# Patient Record
Sex: FEMALE | Race: WHITE | NOT HISPANIC OR LATINO | Employment: OTHER | URBAN - METROPOLITAN AREA
[De-identification: names, ages, dates, MRNs, and addresses within clinical notes are randomized per-mention and may not be internally consistent; named-entity substitution may affect disease eponyms.]

---

## 2017-03-15 PROBLEM — R73.02 GLUCOSE INTOLERANCE (IMPAIRED GLUCOSE TOLERANCE): Status: ACTIVE | Noted: 2017-03-15

## 2017-06-06 ENCOUNTER — PATIENT OUTREACH (OUTPATIENT)
Dept: HEALTH INFORMATION MANAGEMENT | Facility: OTHER | Age: 67
End: 2017-06-06

## 2017-06-06 NOTE — PROGRESS NOTES
Attempt #:1    WebIZ Checked & Epic Updated: no  HealthConnect Verified: no  Verify PCP: yes    Communication Preference Obtained: yes     Review Care Team: yes    Annual Wellness Visit Scheduling  1. Scheduling Status:Scheduled     Care Coordination Enrollment (Attempt to Enroll in Care Coordination)  2. Is patient appropriate: YES  3. Is patient interested: NO - not interested     Care Gap Scheduling (Attempt to Schedule EACH Overdue Care Gap!)     Health Maintenance Due   Topic Date Due   • IMM ZOSTER VACCINE  01/27/2010   • BONE DENSITY  05/14/2010   • Annual Wellness Visit  SCHEDULED    • IMM PNEUMOCOCCAL 65+ (ADULT) LOW/MEDIUM RISK SERIES (2 of 2 - PPSV23) 05/03/2017   • MAMMOGRAM  05/09/2017         Fight My Monster Activation: already active  Fight My Monster Mio: no  Virtual Visits: no  Opt In to Text Messages: no

## 2017-12-11 ENCOUNTER — PATIENT OUTREACH (OUTPATIENT)
Dept: HEALTH INFORMATION MANAGEMENT | Facility: OTHER | Age: 67
End: 2017-12-11

## 2017-12-12 NOTE — PROGRESS NOTES
Attempt #:1  Verify PCP: yes    Communication Preference Obtained: yes     Review Care Team: no    Annual Wellness Visit Scheduling  1. Scheduling Status:Scheduled        Care Gap Scheduling (Attempt to Schedule EACH Overdue Care Gap!)     Health Maintenance Due   Topic Date Due   • IMM ZOSTER VACCINE  01/27/2010   • BONE DENSITY  05/14/2010   • Annual Wellness Visit  01/03/2016   • IMM PNEUMOCOCCAL 65+ (ADULT) LOW/MEDIUM RISK SERIES (2 of 2 - PPSV23) 05/03/2017   • MAMMOGRAM  05/09/2017   • IMM INFLUENZA (1) 09/01/2017        Scheduled patient for Annual Wellness Visit       Conelum Activation: ACTIVE  Conelum Mio: no  Virtual Visits: no  Opt In to Text Messages: no

## 2019-02-15 PROBLEM — K21.9 GASTROESOPHAGEAL REFLUX DISEASE WITHOUT ESOPHAGITIS: Status: ACTIVE | Noted: 2019-02-15

## 2019-02-19 PROBLEM — K22.2 ESOPHAGEAL STRICTURE: Status: ACTIVE | Noted: 2019-02-19

## 2019-02-19 PROBLEM — K22.70 BARRETT'S ESOPHAGUS WITHOUT DYSPLASIA: Status: ACTIVE | Noted: 2019-02-19

## 2019-06-10 PROBLEM — J30.1 SEASONAL ALLERGIC RHINITIS DUE TO POLLEN: Status: ACTIVE | Noted: 2019-06-10

## 2020-09-24 ENCOUNTER — PRE-ADMISSION TESTING (OUTPATIENT)
Dept: ADMISSIONS | Facility: MEDICAL CENTER | Age: 70
DRG: 329 | End: 2020-09-24
Attending: SURGERY
Payer: MEDICARE

## 2020-09-24 DIAGNOSIS — Z01.812 PRE-OPERATIVE LABORATORY EXAMINATION: Primary | ICD-10-CM

## 2020-09-24 RX ORDER — IPRATROPIUM BROMIDE 21 UG/1
2 SPRAY, METERED NASAL EVERY 12 HOURS
COMMUNITY

## 2020-09-24 RX ORDER — DULOXETIN HYDROCHLORIDE 60 MG/1
120 CAPSULE, DELAYED RELEASE ORAL DAILY
COMMUNITY
End: 2021-06-03

## 2020-09-24 NOTE — OR NURSING
Left message for [facility] surgery scheduler at Dr Boyce's office regarding surgery 9/29/2020.  Tamiko states she does not know about surgery scheduled with Dr Carbajal (placing bilateral ureteral stents) at the same time as Dr Boyce.  Patient will call Dr Boyce this afternoon to discuss with them.

## 2020-09-25 ENCOUNTER — PRE-ADMISSION TESTING (OUTPATIENT)
Dept: ADMISSIONS | Facility: MEDICAL CENTER | Age: 70
DRG: 329 | End: 2020-09-25
Attending: SURGERY
Payer: MEDICARE

## 2020-09-25 LAB
COVID ORDER STATUS COVID19: NORMAL
SARS-COV-2 RNA RESP QL NAA+PROBE: NOTDETECTED
SPECIMEN SOURCE: NORMAL

## 2020-09-25 PROCEDURE — U0003 INFECTIOUS AGENT DETECTION BY NUCLEIC ACID (DNA OR RNA); SEVERE ACUTE RESPIRATORY SYNDROME CORONAVIRUS 2 (SARS-COV-2) (CORONAVIRUS DISEASE [COVID-19]), AMPLIFIED PROBE TECHNIQUE, MAKING USE OF HIGH THROUGHPUT TECHNOLOGIES AS DESCRIBED BY CMS-2020-01-R: HCPCS

## 2020-09-25 PROCEDURE — C9803 HOPD COVID-19 SPEC COLLECT: HCPCS

## 2020-09-28 ENCOUNTER — ANESTHESIA EVENT (OUTPATIENT)
Dept: SURGERY | Facility: MEDICAL CENTER | Age: 70
DRG: 329 | End: 2020-09-28
Payer: MEDICARE

## 2020-09-29 ENCOUNTER — APPOINTMENT (OUTPATIENT)
Dept: RADIOLOGY | Facility: MEDICAL CENTER | Age: 70
DRG: 329 | End: 2020-09-29
Attending: UROLOGY
Payer: MEDICARE

## 2020-09-29 ENCOUNTER — HOSPITAL ENCOUNTER (INPATIENT)
Facility: MEDICAL CENTER | Age: 70
LOS: 17 days | DRG: 329 | End: 2020-10-16
Attending: SURGERY | Admitting: SURGERY
Payer: MEDICARE

## 2020-09-29 ENCOUNTER — ANESTHESIA (OUTPATIENT)
Dept: SURGERY | Facility: MEDICAL CENTER | Age: 70
DRG: 329 | End: 2020-09-29
Payer: MEDICARE

## 2020-09-29 DIAGNOSIS — Z93.2 ILEOSTOMY IN PLACE (HCC): ICD-10-CM

## 2020-09-29 DIAGNOSIS — G89.18 POST-OP PAIN: ICD-10-CM

## 2020-09-29 LAB
EKG IMPRESSION: NORMAL
GLUCOSE BLD-MCNC: 98 MG/DL (ref 65–99)
PATHOLOGY CONSULT NOTE: NORMAL

## 2020-09-29 PROCEDURE — 160048 HCHG OR STATISTICAL LEVEL 1-5: Performed by: SURGERY

## 2020-09-29 PROCEDURE — 770001 HCHG ROOM/CARE - MED/SURG/GYN PRIV*

## 2020-09-29 PROCEDURE — BT141ZZ FLUOROSCOPY OF KIDNEYS, URETERS AND BLADDER USING LOW OSMOLAR CONTRAST: ICD-10-PCS | Performed by: UROLOGY

## 2020-09-29 PROCEDURE — 700105 HCHG RX REV CODE 258: Performed by: SURGERY

## 2020-09-29 PROCEDURE — 501460 HCHG STAPLER, TA55 35LD: Performed by: SURGERY

## 2020-09-29 PROCEDURE — 88307 TISSUE EXAM BY PATHOLOGIST: CPT

## 2020-09-29 PROCEDURE — 700102 HCHG RX REV CODE 250 W/ 637 OVERRIDE(OP): Performed by: STUDENT IN AN ORGANIZED HEALTH CARE EDUCATION/TRAINING PROGRAM

## 2020-09-29 PROCEDURE — 501452 HCHG STAPLES, GIA MULTIFIRE 60/80: Performed by: SURGERY

## 2020-09-29 PROCEDURE — C1769 GUIDE WIRE: HCPCS | Performed by: SURGERY

## 2020-09-29 PROCEDURE — 160035 HCHG PACU - 1ST 60 MINS PHASE I: Performed by: SURGERY

## 2020-09-29 PROCEDURE — 700101 HCHG RX REV CODE 250: Performed by: STUDENT IN AN ORGANIZED HEALTH CARE EDUCATION/TRAINING PROGRAM

## 2020-09-29 PROCEDURE — A9270 NON-COVERED ITEM OR SERVICE: HCPCS | Performed by: STUDENT IN AN ORGANIZED HEALTH CARE EDUCATION/TRAINING PROGRAM

## 2020-09-29 PROCEDURE — 93010 ELECTROCARDIOGRAM REPORT: CPT | Performed by: INTERNAL MEDICINE

## 2020-09-29 PROCEDURE — 88304 TISSUE EXAM BY PATHOLOGIST: CPT | Mod: 59

## 2020-09-29 PROCEDURE — 700111 HCHG RX REV CODE 636 W/ 250 OVERRIDE (IP): Performed by: SURGERY

## 2020-09-29 PROCEDURE — 501838 HCHG SUTURE GENERAL: Performed by: SURGERY

## 2020-09-29 PROCEDURE — 160009 HCHG ANES TIME/MIN: Performed by: SURGERY

## 2020-09-29 PROCEDURE — 501435 HCHG STAPLER, LINEAR 60: Performed by: SURGERY

## 2020-09-29 PROCEDURE — 160028 HCHG SURGERY MINUTES - 1ST 30 MINS LEVEL 3: Performed by: SURGERY

## 2020-09-29 PROCEDURE — 700111 HCHG RX REV CODE 636 W/ 250 OVERRIDE (IP): Performed by: STUDENT IN AN ORGANIZED HEALTH CARE EDUCATION/TRAINING PROGRAM

## 2020-09-29 PROCEDURE — 502704 HCHG DEVICE, LIGASURE IMPACT: Performed by: SURGERY

## 2020-09-29 PROCEDURE — 0DN80ZZ RELEASE SMALL INTESTINE, OPEN APPROACH: ICD-10-PCS | Performed by: SURGERY

## 2020-09-29 PROCEDURE — A9270 NON-COVERED ITEM OR SERVICE: HCPCS | Performed by: SURGERY

## 2020-09-29 PROCEDURE — C1758 CATHETER, URETERAL: HCPCS | Performed by: SURGERY

## 2020-09-29 PROCEDURE — 500380 HCHG DRAIN, PENROSE 1/4X12: Performed by: SURGERY

## 2020-09-29 PROCEDURE — 82962 GLUCOSE BLOOD TEST: CPT

## 2020-09-29 PROCEDURE — 93005 ELECTROCARDIOGRAM TRACING: CPT | Performed by: SURGERY

## 2020-09-29 PROCEDURE — 0DSN0ZZ REPOSITION SIGMOID COLON, OPEN APPROACH: ICD-10-PCS | Performed by: SURGERY

## 2020-09-29 PROCEDURE — C1765 ADHESION BARRIER: HCPCS | Performed by: SURGERY

## 2020-09-29 PROCEDURE — 501433 HCHG STAPLER, GIA MULTIFIRE 60/80: Performed by: SURGERY

## 2020-09-29 PROCEDURE — 160039 HCHG SURGERY MINUTES - EA ADDL 1 MIN LEVEL 3: Performed by: SURGERY

## 2020-09-29 PROCEDURE — A6402 STERILE GAUZE <= 16 SQ IN: HCPCS | Performed by: SURGERY

## 2020-09-29 PROCEDURE — 500042 HCHG BAG, URINARY DRAINAGE (CLOSED): Performed by: SURGERY

## 2020-09-29 PROCEDURE — 160036 HCHG PACU - EA ADDL 30 MINS PHASE I: Performed by: SURGERY

## 2020-09-29 PROCEDURE — 700102 HCHG RX REV CODE 250 W/ 637 OVERRIDE(OP): Performed by: SURGERY

## 2020-09-29 PROCEDURE — 500378 HCHG DRAIN, J-VAC ROUND 19FR: Performed by: SURGERY

## 2020-09-29 PROCEDURE — 0T788DZ DILATION OF BILATERAL URETERS WITH INTRALUMINAL DEVICE, VIA NATURAL OR ARTIFICIAL OPENING ENDOSCOPIC: ICD-10-PCS | Performed by: UROLOGY

## 2020-09-29 PROCEDURE — 700101 HCHG RX REV CODE 250: Performed by: SURGERY

## 2020-09-29 PROCEDURE — 501428 HCHG STAPLER, CURVED: Performed by: SURGERY

## 2020-09-29 PROCEDURE — 0T9B80Z DRAINAGE OF BLADDER WITH DRAINAGE DEVICE, VIA NATURAL OR ARTIFICIAL OPENING ENDOSCOPIC: ICD-10-PCS | Performed by: UROLOGY

## 2020-09-29 PROCEDURE — 700117 HCHG RX CONTRAST REV CODE 255: Performed by: UROLOGY

## 2020-09-29 PROCEDURE — 160002 HCHG RECOVERY MINUTES (STAT): Performed by: SURGERY

## 2020-09-29 PROCEDURE — 770006 HCHG ROOM/CARE - MED/SURG/GYN SEMI*

## 2020-09-29 RX ORDER — TRAZODONE HYDROCHLORIDE 50 MG/1
50 TABLET ORAL NIGHTLY PRN
Status: DISCONTINUED | OUTPATIENT
Start: 2020-09-29 | End: 2020-10-06

## 2020-09-29 RX ORDER — ONDANSETRON 2 MG/ML
4 INJECTION INTRAMUSCULAR; INTRAVENOUS EVERY 4 HOURS PRN
Status: DISCONTINUED | OUTPATIENT
Start: 2020-09-29 | End: 2020-10-16 | Stop reason: HOSPADM

## 2020-09-29 RX ORDER — HALOPERIDOL 5 MG/ML
1 INJECTION INTRAMUSCULAR EVERY 6 HOURS PRN
Status: DISCONTINUED | OUTPATIENT
Start: 2020-09-29 | End: 2020-10-11

## 2020-09-29 RX ORDER — LABETALOL HYDROCHLORIDE 5 MG/ML
INJECTION, SOLUTION INTRAVENOUS PRN
Status: DISCONTINUED | OUTPATIENT
Start: 2020-09-29 | End: 2020-09-29 | Stop reason: SURG

## 2020-09-29 RX ORDER — SODIUM CHLORIDE, SODIUM LACTATE, POTASSIUM CHLORIDE, CALCIUM CHLORIDE 600; 310; 30; 20 MG/100ML; MG/100ML; MG/100ML; MG/100ML
INJECTION, SOLUTION INTRAVENOUS CONTINUOUS
Status: DISCONTINUED | OUTPATIENT
Start: 2020-09-29 | End: 2020-09-30

## 2020-09-29 RX ORDER — HYDROMORPHONE HYDROCHLORIDE 1 MG/ML
0.1 INJECTION, SOLUTION INTRAMUSCULAR; INTRAVENOUS; SUBCUTANEOUS
Status: DISCONTINUED | OUTPATIENT
Start: 2020-09-29 | End: 2020-09-29 | Stop reason: HOSPADM

## 2020-09-29 RX ORDER — TRAZODONE HYDROCHLORIDE 50 MG/1
50-100 TABLET ORAL
Status: DISCONTINUED | OUTPATIENT
Start: 2020-09-29 | End: 2020-10-06

## 2020-09-29 RX ORDER — TIMOLOL MALEATE 5 MG/ML
1 SOLUTION/ DROPS OPHTHALMIC 2 TIMES DAILY
Status: DISCONTINUED | OUTPATIENT
Start: 2020-09-29 | End: 2020-10-16 | Stop reason: HOSPADM

## 2020-09-29 RX ORDER — IPRATROPIUM BROMIDE 21 UG/1
2 SPRAY, METERED NASAL EVERY 12 HOURS
Status: DISCONTINUED | OUTPATIENT
Start: 2020-09-29 | End: 2020-10-16 | Stop reason: HOSPADM

## 2020-09-29 RX ORDER — ACETAMINOPHEN 500 MG
TABLET ORAL
Status: COMPLETED
Start: 2020-09-29 | End: 2020-09-29

## 2020-09-29 RX ORDER — SCOLOPAMINE TRANSDERMAL SYSTEM 1 MG/1
1 PATCH, EXTENDED RELEASE TRANSDERMAL
Status: DISCONTINUED | OUTPATIENT
Start: 2020-09-29 | End: 2020-10-16 | Stop reason: HOSPADM

## 2020-09-29 RX ORDER — GLYCOPYRROLATE 0.2 MG/ML
INJECTION INTRAMUSCULAR; INTRAVENOUS PRN
Status: DISCONTINUED | OUTPATIENT
Start: 2020-09-29 | End: 2020-09-29 | Stop reason: SURG

## 2020-09-29 RX ORDER — LIDOCAINE HYDROCHLORIDE 20 MG/ML
INJECTION, SOLUTION EPIDURAL; INFILTRATION; INTRACAUDAL; PERINEURAL PRN
Status: DISCONTINUED | OUTPATIENT
Start: 2020-09-29 | End: 2020-09-29 | Stop reason: SURG

## 2020-09-29 RX ORDER — SODIUM CHLORIDE, SODIUM LACTATE, POTASSIUM CHLORIDE, CALCIUM CHLORIDE 600; 310; 30; 20 MG/100ML; MG/100ML; MG/100ML; MG/100ML
INJECTION, SOLUTION INTRAVENOUS CONTINUOUS
Status: ACTIVE | OUTPATIENT
Start: 2020-09-29 | End: 2020-09-29

## 2020-09-29 RX ORDER — HYDROMORPHONE HYDROCHLORIDE 1 MG/ML
0.4 INJECTION, SOLUTION INTRAMUSCULAR; INTRAVENOUS; SUBCUTANEOUS
Status: DISCONTINUED | OUTPATIENT
Start: 2020-09-29 | End: 2020-09-29 | Stop reason: HOSPADM

## 2020-09-29 RX ORDER — PILOCARPINE HYDROCHLORIDE 5 MG/1
5 TABLET, FILM COATED ORAL 3 TIMES DAILY
Status: DISCONTINUED | OUTPATIENT
Start: 2020-09-29 | End: 2020-10-16 | Stop reason: HOSPADM

## 2020-09-29 RX ORDER — KETOROLAC TROMETHAMINE 30 MG/ML
15 INJECTION, SOLUTION INTRAMUSCULAR; INTRAVENOUS EVERY 6 HOURS
Status: DISPENSED | OUTPATIENT
Start: 2020-09-29 | End: 2020-10-02

## 2020-09-29 RX ORDER — HYDROMORPHONE HYDROCHLORIDE 1 MG/ML
0.2 INJECTION, SOLUTION INTRAMUSCULAR; INTRAVENOUS; SUBCUTANEOUS
Status: DISCONTINUED | OUTPATIENT
Start: 2020-09-29 | End: 2020-09-29 | Stop reason: HOSPADM

## 2020-09-29 RX ORDER — OXYCODONE HCL 5 MG/5 ML
10 SOLUTION, ORAL ORAL
Status: COMPLETED | OUTPATIENT
Start: 2020-09-29 | End: 2020-09-29

## 2020-09-29 RX ORDER — ONDANSETRON 2 MG/ML
4 INJECTION INTRAMUSCULAR; INTRAVENOUS
Status: DISCONTINUED | OUTPATIENT
Start: 2020-09-29 | End: 2020-09-29 | Stop reason: HOSPADM

## 2020-09-29 RX ORDER — DIPHENHYDRAMINE HYDROCHLORIDE 50 MG/ML
25 INJECTION INTRAMUSCULAR; INTRAVENOUS EVERY 6 HOURS PRN
Status: DISCONTINUED | OUTPATIENT
Start: 2020-09-29 | End: 2020-10-11

## 2020-09-29 RX ORDER — ONDANSETRON 2 MG/ML
INJECTION INTRAMUSCULAR; INTRAVENOUS PRN
Status: DISCONTINUED | OUTPATIENT
Start: 2020-09-29 | End: 2020-09-29 | Stop reason: SURG

## 2020-09-29 RX ORDER — KETAMINE HYDROCHLORIDE 50 MG/ML
INJECTION, SOLUTION INTRAMUSCULAR; INTRAVENOUS PRN
Status: DISCONTINUED | OUTPATIENT
Start: 2020-09-29 | End: 2020-09-29 | Stop reason: SURG

## 2020-09-29 RX ORDER — SODIUM CHLORIDE, SODIUM LACTATE, POTASSIUM CHLORIDE, CALCIUM CHLORIDE 600; 310; 30; 20 MG/100ML; MG/100ML; MG/100ML; MG/100ML
INJECTION, SOLUTION INTRAVENOUS CONTINUOUS
Status: CANCELLED | OUTPATIENT
Start: 2020-09-29 | End: 2020-09-29

## 2020-09-29 RX ORDER — HYDROMORPHONE HYDROCHLORIDE 2 MG/ML
INJECTION, SOLUTION INTRAMUSCULAR; INTRAVENOUS; SUBCUTANEOUS PRN
Status: DISCONTINUED | OUTPATIENT
Start: 2020-09-29 | End: 2020-09-29 | Stop reason: SURG

## 2020-09-29 RX ORDER — MIDAZOLAM HYDROCHLORIDE 1 MG/ML
INJECTION INTRAMUSCULAR; INTRAVENOUS PRN
Status: DISCONTINUED | OUTPATIENT
Start: 2020-09-29 | End: 2020-09-29 | Stop reason: SURG

## 2020-09-29 RX ORDER — LATANOPROST 50 UG/ML
1 SOLUTION/ DROPS OPHTHALMIC NIGHTLY
Status: DISCONTINUED | OUTPATIENT
Start: 2020-09-29 | End: 2020-10-16 | Stop reason: HOSPADM

## 2020-09-29 RX ORDER — DEXAMETHASONE SODIUM PHOSPHATE 4 MG/ML
4 INJECTION, SOLUTION INTRA-ARTICULAR; INTRALESIONAL; INTRAMUSCULAR; INTRAVENOUS; SOFT TISSUE
Status: DISCONTINUED | OUTPATIENT
Start: 2020-09-29 | End: 2020-10-08

## 2020-09-29 RX ORDER — DULOXETIN HYDROCHLORIDE 60 MG/1
120 CAPSULE, DELAYED RELEASE ORAL DAILY
Status: DISCONTINUED | OUTPATIENT
Start: 2020-09-30 | End: 2020-10-16 | Stop reason: HOSPADM

## 2020-09-29 RX ORDER — ACETAMINOPHEN 325 MG/1
TABLET ORAL PRN
Status: DISCONTINUED | OUTPATIENT
Start: 2020-09-29 | End: 2020-09-29 | Stop reason: SURG

## 2020-09-29 RX ORDER — DORZOLAMIDE HCL 20 MG/ML
1 SOLUTION/ DROPS OPHTHALMIC 3 TIMES DAILY
Status: DISCONTINUED | OUTPATIENT
Start: 2020-09-29 | End: 2020-10-16 | Stop reason: HOSPADM

## 2020-09-29 RX ORDER — FAMOTIDINE 20 MG/1
20 TABLET, FILM COATED ORAL 2 TIMES DAILY
Status: DISCONTINUED | OUTPATIENT
Start: 2020-09-29 | End: 2020-10-09

## 2020-09-29 RX ORDER — CALCIUM CARBONATE 500 MG/1
500 TABLET, CHEWABLE ORAL
Status: DISCONTINUED | OUTPATIENT
Start: 2020-09-29 | End: 2020-10-16 | Stop reason: HOSPADM

## 2020-09-29 RX ORDER — OXYCODONE HCL 5 MG/5 ML
5 SOLUTION, ORAL ORAL
Status: COMPLETED | OUTPATIENT
Start: 2020-09-29 | End: 2020-09-29

## 2020-09-29 RX ORDER — DEXMEDETOMIDINE HYDROCHLORIDE 100 UG/ML
INJECTION, SOLUTION INTRAVENOUS PRN
Status: DISCONTINUED | OUTPATIENT
Start: 2020-09-29 | End: 2020-09-29 | Stop reason: SURG

## 2020-09-29 RX ORDER — BUPIVACAINE HYDROCHLORIDE AND EPINEPHRINE 2.5; 5 MG/ML; UG/ML
INJECTION, SOLUTION EPIDURAL; INFILTRATION; INTRACAUDAL; PERINEURAL
Status: DISCONTINUED | OUTPATIENT
Start: 2020-09-29 | End: 2020-09-29 | Stop reason: HOSPADM

## 2020-09-29 RX ORDER — DEXAMETHASONE SODIUM PHOSPHATE 4 MG/ML
INJECTION, SOLUTION INTRA-ARTICULAR; INTRALESIONAL; INTRAMUSCULAR; INTRAVENOUS; SOFT TISSUE PRN
Status: DISCONTINUED | OUTPATIENT
Start: 2020-09-29 | End: 2020-09-29 | Stop reason: SURG

## 2020-09-29 RX ORDER — ACETAMINOPHEN 500 MG
1000 TABLET ORAL EVERY 6 HOURS
Status: DISPENSED | OUTPATIENT
Start: 2020-09-29 | End: 2020-10-04

## 2020-09-29 RX ORDER — CEFOTETAN DISODIUM 2 G/20ML
INJECTION, POWDER, FOR SOLUTION INTRAMUSCULAR; INTRAVENOUS PRN
Status: DISCONTINUED | OUTPATIENT
Start: 2020-09-29 | End: 2020-09-29 | Stop reason: SURG

## 2020-09-29 RX ORDER — HALOPERIDOL 5 MG/ML
1 INJECTION INTRAMUSCULAR
Status: DISCONTINUED | OUTPATIENT
Start: 2020-09-29 | End: 2020-09-29 | Stop reason: HOSPADM

## 2020-09-29 RX ORDER — ROCURONIUM BROMIDE 10 MG/ML
INJECTION, SOLUTION INTRAVENOUS PRN
Status: DISCONTINUED | OUTPATIENT
Start: 2020-09-29 | End: 2020-09-29 | Stop reason: SURG

## 2020-09-29 RX ORDER — MORPHINE SULFATE 4 MG/ML
4 INJECTION, SOLUTION INTRAMUSCULAR; INTRAVENOUS
Status: DISCONTINUED | OUTPATIENT
Start: 2020-09-29 | End: 2020-10-02

## 2020-09-29 RX ADMIN — KETAMINE HYDROCHLORIDE 25 MG: 50 INJECTION INTRAMUSCULAR; INTRAVENOUS at 10:16

## 2020-09-29 RX ADMIN — DEXMEDETOMIDINE HYDROCHLORIDE 10 MCG: 100 INJECTION, SOLUTION INTRAVENOUS at 10:26

## 2020-09-29 RX ADMIN — FENTANYL CITRATE 50 MCG: 50 INJECTION, SOLUTION INTRAMUSCULAR; INTRAVENOUS at 10:35

## 2020-09-29 RX ADMIN — MORPHINE SULFATE: 50 INJECTION, SOLUTION, CONCENTRATE INTRAVENOUS at 14:36

## 2020-09-29 RX ADMIN — TRAZODONE HYDROCHLORIDE 100 MG: 50 TABLET ORAL at 20:47

## 2020-09-29 RX ADMIN — ACETAMINOPHEN 1000 MG: 325 TABLET, FILM COATED ORAL at 07:53

## 2020-09-29 RX ADMIN — HYDROMORPHONE HYDROCHLORIDE 0.4 MG: 2 INJECTION, SOLUTION INTRAMUSCULAR; INTRAVENOUS; SUBCUTANEOUS at 09:21

## 2020-09-29 RX ADMIN — ROCURONIUM BROMIDE 10 MG: 10 INJECTION, SOLUTION INTRAVENOUS at 09:55

## 2020-09-29 RX ADMIN — SODIUM CHLORIDE, POTASSIUM CHLORIDE, SODIUM LACTATE AND CALCIUM CHLORIDE: 600; 310; 30; 20 INJECTION, SOLUTION INTRAVENOUS at 08:28

## 2020-09-29 RX ADMIN — DEXMEDETOMIDINE HYDROCHLORIDE 10 MCG: 100 INJECTION, SOLUTION INTRAVENOUS at 10:21

## 2020-09-29 RX ADMIN — HYDROMORPHONE HYDROCHLORIDE 0.2 MG: 1 INJECTION, SOLUTION INTRAMUSCULAR; INTRAVENOUS; SUBCUTANEOUS at 13:21

## 2020-09-29 RX ADMIN — GLYCOPYRROLATE 0.2 MG: 0.2 INJECTION INTRAMUSCULAR; INTRAVENOUS at 09:22

## 2020-09-29 RX ADMIN — FENTANYL CITRATE 50 MCG: 50 INJECTION, SOLUTION INTRAMUSCULAR; INTRAVENOUS at 08:35

## 2020-09-29 RX ADMIN — TIMOLOL MALEATE 1 DROP: 5 SOLUTION/ DROPS OPHTHALMIC at 16:55

## 2020-09-29 RX ADMIN — ROCURONIUM BROMIDE 20 MG: 10 INJECTION, SOLUTION INTRAVENOUS at 09:25

## 2020-09-29 RX ADMIN — HYDROMORPHONE HYDROCHLORIDE 0.4 MG: 1 INJECTION, SOLUTION INTRAMUSCULAR; INTRAVENOUS; SUBCUTANEOUS at 12:36

## 2020-09-29 RX ADMIN — DEXAMETHASONE SODIUM PHOSPHATE 4 MG: 4 INJECTION, SOLUTION INTRA-ARTICULAR; INTRALESIONAL; INTRAMUSCULAR; INTRAVENOUS; SOFT TISSUE at 08:43

## 2020-09-29 RX ADMIN — FENTANYL CITRATE 50 MCG: 50 INJECTION INTRAMUSCULAR; INTRAVENOUS at 11:46

## 2020-09-29 RX ADMIN — KETOROLAC TROMETHAMINE 15 MG: 30 INJECTION, SOLUTION INTRAMUSCULAR; INTRAVENOUS at 20:47

## 2020-09-29 RX ADMIN — ONDANSETRON 4 MG: 2 INJECTION INTRAMUSCULAR; INTRAVENOUS at 08:43

## 2020-09-29 RX ADMIN — ACETAMINOPHEN 1000 MG: 500 TABLET ORAL at 20:47

## 2020-09-29 RX ADMIN — SODIUM CHLORIDE, POTASSIUM CHLORIDE, SODIUM LACTATE AND CALCIUM CHLORIDE: 600; 310; 30; 20 INJECTION, SOLUTION INTRAVENOUS at 14:35

## 2020-09-29 RX ADMIN — FENTANYL CITRATE 25 MCG: 50 INJECTION, SOLUTION INTRAMUSCULAR; INTRAVENOUS at 11:58

## 2020-09-29 RX ADMIN — MIDAZOLAM HYDROCHLORIDE 2 MG: 1 INJECTION, SOLUTION INTRAMUSCULAR; INTRAVENOUS at 08:35

## 2020-09-29 RX ADMIN — LABETALOL HYDROCHLORIDE 5 MG: 5 INJECTION, SOLUTION INTRAVENOUS at 10:30

## 2020-09-29 RX ADMIN — FENTANYL CITRATE 50 MCG: 50 INJECTION, SOLUTION INTRAMUSCULAR; INTRAVENOUS at 08:40

## 2020-09-29 RX ADMIN — CEFOTETAN DISODIUM 2 G: 2 INJECTION, POWDER, FOR SOLUTION INTRAMUSCULAR; INTRAVENOUS at 08:35

## 2020-09-29 RX ADMIN — ACETAMINOPHEN 1000 MG: 500 TABLET ORAL at 14:36

## 2020-09-29 RX ADMIN — ROCURONIUM BROMIDE 50 MG: 10 INJECTION, SOLUTION INTRAVENOUS at 08:35

## 2020-09-29 RX ADMIN — LATANOPROST 1 DROP: 50 SOLUTION OPHTHALMIC at 20:48

## 2020-09-29 RX ADMIN — KETOROLAC TROMETHAMINE 15 MG: 30 INJECTION, SOLUTION INTRAMUSCULAR; INTRAVENOUS at 14:36

## 2020-09-29 RX ADMIN — FAMOTIDINE 20 MG: 20 TABLET, FILM COATED ORAL at 16:55

## 2020-09-29 RX ADMIN — FENTANYL CITRATE 50 MCG: 50 INJECTION INTRAMUSCULAR; INTRAVENOUS at 11:29

## 2020-09-29 RX ADMIN — SUGAMMADEX 200 MG: 100 INJECTION, SOLUTION INTRAVENOUS at 10:41

## 2020-09-29 RX ADMIN — PILOCARPINE HYDROCHLORIDE 5 MG: 5 TABLET, FILM COATED ORAL at 16:55

## 2020-09-29 RX ADMIN — IPRATROPIUM BROMIDE 2 SPRAY: 21 SPRAY NASAL at 16:55

## 2020-09-29 RX ADMIN — OXYCODONE HYDROCHLORIDE 10 MG: 5 SOLUTION ORAL at 11:27

## 2020-09-29 RX ADMIN — DORZOLAMIDE HYDROCHLORIDE 1 DROP: 20 SOLUTION/ DROPS OPHTHALMIC at 16:55

## 2020-09-29 RX ADMIN — HYDROMORPHONE HYDROCHLORIDE 0.4 MG: 1 INJECTION, SOLUTION INTRAMUSCULAR; INTRAVENOUS; SUBCUTANEOUS at 12:50

## 2020-09-29 RX ADMIN — LIDOCAINE HYDROCHLORIDE 100 MG: 20 INJECTION, SOLUTION EPIDURAL; INFILTRATION; INTRACAUDAL at 08:35

## 2020-09-29 RX ADMIN — PROPOFOL 50 MG: 10 INJECTION, EMULSION INTRAVENOUS at 08:35

## 2020-09-29 ASSESSMENT — PATIENT HEALTH QUESTIONNAIRE - PHQ9
1. LITTLE INTEREST OR PLEASURE IN DOING THINGS: NOT AT ALL
2. FEELING DOWN, DEPRESSED, IRRITABLE, OR HOPELESS: NOT AT ALL
SUM OF ALL RESPONSES TO PHQ9 QUESTIONS 1 AND 2: 0

## 2020-09-29 ASSESSMENT — COGNITIVE AND FUNCTIONAL STATUS - GENERAL
STANDING UP FROM CHAIR USING ARMS: A LITTLE
MOVING FROM LYING ON BACK TO SITTING ON SIDE OF FLAT BED: A LITTLE
DRESSING REGULAR UPPER BODY CLOTHING: A LITTLE
MOBILITY SCORE: 18
WALKING IN HOSPITAL ROOM: A LITTLE
HELP NEEDED FOR BATHING: A LITTLE
DRESSING REGULAR LOWER BODY CLOTHING: A LITTLE
SUGGESTED CMS G CODE MODIFIER MOBILITY: CK
MOVING TO AND FROM BED TO CHAIR: A LITTLE
TOILETING: A LITTLE
TURNING FROM BACK TO SIDE WHILE IN FLAT BAD: A LITTLE
SUGGESTED CMS G CODE MODIFIER DAILY ACTIVITY: CJ
CLIMB 3 TO 5 STEPS WITH RAILING: A LITTLE
DAILY ACTIVITIY SCORE: 20

## 2020-09-29 ASSESSMENT — LIFESTYLE VARIABLES
HOW MANY TIMES IN THE PAST YEAR HAVE YOU HAD 5 OR MORE DRINKS IN A DAY: 0
TOTAL SCORE: 0
HAVE YOU EVER FELT YOU SHOULD CUT DOWN ON YOUR DRINKING: NO
EVER HAD A DRINK FIRST THING IN THE MORNING TO STEADY YOUR NERVES TO GET RID OF A HANGOVER: NO
AVERAGE NUMBER OF DAYS PER WEEK YOU HAVE A DRINK CONTAINING ALCOHOL: 7
ON A TYPICAL DAY WHEN YOU DRINK ALCOHOL HOW MANY DRINKS DO YOU HAVE: 1
DOES PATIENT WANT TO STOP DRINKING: NO
HAVE PEOPLE ANNOYED YOU BY CRITICIZING YOUR DRINKING: NO
EVER FELT BAD OR GUILTY ABOUT YOUR DRINKING: NO
CONSUMPTION TOTAL: NEGATIVE
TOTAL SCORE: 0
ALCOHOL_USE: YES
TOTAL SCORE: 0

## 2020-09-29 ASSESSMENT — PAIN DESCRIPTION - PAIN TYPE
TYPE: SURGICAL PAIN

## 2020-09-29 ASSESSMENT — FIBROSIS 4 INDEX: FIB4 SCORE: 1.26

## 2020-09-29 ASSESSMENT — PAIN SCALES - GENERAL: PAIN_LEVEL: 6

## 2020-09-29 NOTE — ANESTHESIA POSTPROCEDURE EVALUATION
Patient: Tamiko Cabrera    Procedure Summary     Date: 09/29/20 Room / Location: [facility] 09 / SURGERY [facility]    Anesthesia Start: 0828 Anesthesia Stop: 1100    Procedures:       CLOSURE, COLOSTOMY, RAMIN, OPEN (Abdomen)      REPAIR, HERNIA, VENTRAL (Abdomen)      CYSTOSCOPY, WITH URETERAL STENT INSERTION (Bilateral Ureter) Diagnosis: (PARASTOMAL HERNIA, COLOSTOMY IN PLACE)    Surgeon: Kenn Boyce M.D.; Balta Carbajal M.D. Responsible Provider: Rodrick Smith M.D.    Anesthesia Type: general ASA Status: 2          Final Anesthesia Type: general  Last vitals  BP   Blood Pressure : 124/67    Temp   36.7 °C (98 °F)    Pulse   Pulse: 75   Resp   16    SpO2   99 %      Anesthesia Post Evaluation    Patient location during evaluation: PACU  Patient participation: complete - patient participated  Level of consciousness: awake and alert  Pain score: 6    Airway patency: patent  Anesthetic complications: no  Cardiovascular status: hemodynamically stable  Respiratory status: acceptable  Hydration status: euvolemic    PONV: none           Nurse Pain Score: 6 (NPRS)

## 2020-09-29 NOTE — OR SURGEON
Immediate Post OP Note    PreOp Diagnosis: End Colostomy    PostOp Diagnosis: same    Procedure(s):  Open Colostomy Reversal  Lysis of Adhesions    Surgeon(s):  RADHA Funes M.D.    Anesthesiologist/Type of Anesthesia:  Anesthesiologist: Rodrick Smith M.D./General    Surgical Staff:  Assistant: ELEAZAR Allison  Circulator: Kali Mariee R.N.  Relief Circulator: Lukas D. Gansert, R.N.  Scrub Person: Mel Marrero R.N.  Radiology Technologist: Tari Ayala    Specimens removed if any:  ID Type Source Tests Collected by Time Destination   A : COLOSTOMY SITE Other Other PATHOLOGY SPECIMEN Kenn Boyce M.D. 9/29/2020  9:41 AM    B : residual sigmoid Tissue Colon - Sigmoid PATHOLOGY SPECIMEN Kenn Boyce M.D. 9/29/2020  9:58 AM    C : colon donuts Tissue Colon PATHOLOGY SPECIMEN Kenn Boyce M.D. 9/29/2020 10:11 AM        Estimated Blood Loss: 50cc    Findings: Abdominal small bowel adhesions removed, end colostomy taken down and colo-rectal anastomosis placed with hemostasis.  No leak on insufflation test.  19F round drain left in pelvis.      Complications: none        9/29/2020 11:08 AM Kenn Boyce M.D.

## 2020-09-29 NOTE — OR NURSING
Report to THIEN Hernández.  Called sister with room number.  Pt resting comfortably, awaiting transport to floor.

## 2020-09-29 NOTE — ANESTHESIA PROCEDURE NOTES
Airway    Date/Time: 9/29/2020 8:38 AM  Performed by: Rodrick Smith M.D.  Authorized by: Rodrick Smith M.D.     Location:  OR  Urgency:  Elective  Indications for Airway Management:  Anesthesia      Spontaneous Ventilation: absent    Sedation Level:  Deep  Preoxygenated: Yes    Patient Position:  Sniffing  Mask Difficulty Assessment:  2 - vent by mask + OA or adjuvant +/- NMBA  Final Airway Type:  Endotracheal airway  Final Endotracheal Airway:  ETT  Cuffed: Yes    Technique Used for Successful ETT Placement:  Video laryngoscopy    Insertion Site:  Oral  Blade Type:  Glide  Laryngoscope Blade/Videolaryngoscope Blade Size:  3  ETT Size (mm):  7.0  Measured from:  Teeth  ETT to Teeth (cm):  21  Placement Verified by: capnometry    Cormack-Lehane Classification:  Grade I - full view of glottis  Number of Attempts at Approach:  1

## 2020-09-29 NOTE — OR SURGEON
Immediate Post OP Note    PreOp Diagnosis: Patient with colostomy.  In today for colostomy takedown.  General surgery requesting ureteral catheters preoperatively.    PostOp Diagnosis: Same as preop diagnosis    Procedure(s):  CLOSURE, COLOSTOMY, RAMIN, OPEN - Wound Class: Dirty or Infected  REPAIR, HERNIA, VENTRAL- WITH MESH - Wound Class: Clean Contaminated  CYSTOSCOPY, WITH URETERAL BILATERAL URETERAL CATHETER INSERTION AND BILATERAL RETROGRADE PYELOGRAMS- Wound Class: Clean Contaminated    Surgeon(s):  RADHA Funes M.D.    Anesthesiologist/Type of Anesthesia:  Anesthesiologist: Rodrick Smith M.D./General    Surgical Staff:  Assistant: ELEAZAR Allison  Circulator: Kali Mariee R.N.  Scrub Person: Mel Marrero R.N.  Radiology Technologist: Tari Ayala    Specimens removed if any:  * No specimens in log *    Estimated Blood Loss: 0 ML    Findings: Normal retrogrades and normal cystoscopy    Complications: none        9/29/2020 8:59 AM Balta Carbajal M.D.

## 2020-09-29 NOTE — CARE PLAN
Problem: Pain Management  Goal: Pain level will decrease to patient's comfort goal  Outcome: PROGRESSING AS EXPECTED  Patient educated on pain medications, availability, and alternatives.      Problem: Safety  Goal: Will remain free from injury  Outcome: PROGRESSING AS EXPECTED  Patient educated to call before mobilizing, call light and belongings within reach, bed locked and in lowest position.

## 2020-09-29 NOTE — OP REPORT
Preop diagnosis-patient with colostomy here today for colostomy takedown.  General surgery requesting ureteral catheters preoperatively.  Postop diagnosis-same as preop diagnosis  Procedure performed-cystoscopy, bilateral retrograde pyelograms and placement of bilateral ureteral catheters.  Surgeon-Balta Carbajal MD  Anesthesiologist- Rodrick Smith MD  Anesthesia type-General anesthesia  Drains-4.8 Australian bilateral ureteral catheters and a 16 Australian Geiger catheter draining the urinary bladder.  EBL 0  Complication-none  Specimen-none    Procedure in detail:  Mirlande is transferred to the operating room on a gurney.  She is transferred from that gurney to the OR table.  She is then given an excellent general anesthetic.  She is repositioned into the dorsal lithotomy position.  She is prepped and draped in the usual fashion.  All pressure points are well-padded.  A timeout is then performed to confirm patient identification, procedures to be performed, review allergies and review preoperative antibiotics.  Once timeout was completed and agreed upon by the entire or crew the case was started.    A 22 Australian rigid cystoscope with a 30 degree angle lens is white balanced and lubricated.  Is then passed through the urethra and into the urinary bladder without difficulty.  The right ureteral orifice is identified and cannulated with a 035 sensor guidewire which passes about to the mid ureter and then it difficult to make it pass any further.  I then passed a open-ended ureteral catheter 4.8 Australian over the guidewire.  With this I was able to advance the guidewire up to the level of the right kidney.  The ureteral catheter was then passed over the guidewire up to the level of the right kidney.  The guidewire was removed.  50% dilute Conray is injected through the ureteral catheter.  This produces an excellent retrograde pyelogram also indicating good position of the tip of the ureteral catheter in the right renal pelvis.    An  identical procedure was then carried out on the left side.  Again retrograde pyelogram demonstrates good position of the tip of the ureteral catheter in the left renal pelvis.  The cystoscope was then removed leaving the ureteral catheters in place.  The left catheter was marked by cutting the end at an oblique.  I then passed a 16 Vietnamese Geiger catheter into the urinary bladder.  10 cc were placed in the balloon.  It is set up to gravity drainage.  The ureteral catheters are then taped to the Geiger catheter with a mesentery to allow them to be easily detached from the Geiger catheter and removed at the end of the case.  A Y connector is then connected to the ureteral catheters and it set up to a separate drainage bag.    The prep was cleaned off the patient.  At this point my part of the procedure is finished.

## 2020-09-29 NOTE — PROGRESS NOTES
Report received from PACU RN.  Patient arrived via bed.  Assessment complete.  A&O x 4. Patient calls appropriately.  Patient mobilizes with one person assist. Bed alarm on.   Patient has 2/10 pain. Bolus button in use.  Denies N&V.NPO sips with medications at this time.  Surgical site to midline with dressing in place. NOEL  LLQ, Pen yolanda to RLQ.  + void, - flatus, - BM.  Patient denies SOB.  SCD's on.  Review plan with of care with patient. Call light and personal belongings with in reach. Hourly rounding in place. All needs met at this time.

## 2020-09-29 NOTE — ANESTHESIA TIME REPORT
Anesthesia Start and Stop Event Times     Date Time Event    9/29/2020 0753 Ready for Procedure     0828 Anesthesia Start     1100 Anesthesia Stop        Responsible Staff  09/29/20    Name Role Begin End    Min KIEL Smith M.D. Anesth 0828 1100        Preop Diagnosis (Free Text):  Pre-op Diagnosis     PARASTOMAL HERNIA, COLOSTOMY IN PLACE        Preop Diagnosis (Codes):    Post op Diagnosis  Colostomy in place (HCC)      Premium Reason  Non-Premium    Comments:

## 2020-09-29 NOTE — OP REPORT
DATE OF SERVICE:  09/29/2020    PREOPERATIVE DIAGNOSIS:  End colostomy.    POSTOPERATIVE DIAGNOSIS:  End colostomy.    PROCEDURES:  Open Claudine's colostomy reversal, lysis of adhesions.    SURGEON:  Kenn Boyce MD    ASSISTANT:  JOSH Dupont    ANESTHESIA:  General endotracheal anesthesia.    ANESTHESIOLOGIST:  ____.    ESTIMATED BLOOD LOSS:  50 mL.    SPECIMENS:  1.  Colostomy site.  2.  Residual sigmoid colon.  3.  Anastomotic donuts.    COMPLICATIONS:  None.    WOUND CLASS:  III.    CONDITION:  Stable.    INDICATION FOR PROCEDURE:  This is a 70-year-old female who presents with an   end colostomy made from the ascending colon in the right abdomen.  She has a   small parastomal hernia associated with this.  This was placed after a   transverse and left colectomy for ischemia.  The patient now presents for   reversal of possible risks, benefits, alternatives of the procedure were   explained to her before proceeding.    OPERATIVE FINDINGS:  End colostomy removed reaches the pelvis without undue   tension.  A residual small amount of sigmoid colon removed and colorectal   anastomosis placed with hemostasis and no leak on insufflation test, 19-English   round drain left in the pelvis.    OPERATIVE TECHNIQUE:  After informed consent was obtained, the patient was   taken to the operating room, placed in supine position.  After adequate   endotracheal anesthesia was achieved, the abdomen was prepped and draped in   sterile fashion.  Before proceeding with the abdominal portion of the case,   Dr. Carbajal placed bilateral ureteral stents with hemostasis.  These were then   removed at the end of the case.    The rectum was washed out with Betadine and the ostomy was sewn shut with a   silk.  We now prepped the abdomen and perineum in a sterile fashion.  We made   a midline laparotomy incision, carried this down with electrocautery to the   level of the fascia.  This was incised and the abdomen sharply  entered.  We   dissected proximally and distally, enlarging the incision.  We then carried   out lysis of adhesions for approximately 30 to 45 minutes, removing adhesions   and repairing several small bowel deserosalizations with interrupted 3-0   Vicryl pops.  With this completed, we were able to visualize the exit of the   ascending colon to the colostomy site.    We made a circular incision around the colostomy site and carried this down   with electrocautery to the level of the fascia.  All attachments of the fascia   and peritoneum were removed and we had healthy bowel into the operative   field.  We then divided the mesentery at a selected point for anastomosis with   a LigaSure device.  We then divided the bowel wall with a Furness clamp and   loaded a 29 EEA anvil into the end of the ascending colon.  This was tied into   place with the pursestring suture and washed with Betadine and reduced back   into the abdominal cavity.    Using clean gloves, we now placed a wound retractor into position and packed   the small bowel out of the pelvis.  We then identified a very small amount of   sigmoid colon remaining and proximal rectum.  We opened the retroperitoneum   behind this and dissected in the bloodless plane.  We took down the left and   right lateral attachments and after isolating divided the residual superior   hemorrhoidal branches with the LigaSure device.  Bilateral ureters were easily   identified in the retroperitoneum with the ureteral stents in place.  We   dissected toward the pelvis and freed up the rectum, such that it was mobile.    We then passed the EEA dilators into the rectum, such that we determined it   was large enough to accommodate the stapler.  We then divided the mesentery at   the planned anastomotic site and then divided the rectal wall itself with a   with a green load contour staple fire with hemostasis.    Next, a 29 EEA stapler was inserted into the patient's rectum, spike was    deployed through the anterior rectal wall, anvil attached and end-to-end   anastomosis carried out with hemostasis.  Insufflation test was performed and   no evidence of leak was noted.  We irrigated the pelvis with 3 liters of warm   saline and a 19-Mauritanian round drain was placed next the anastomosis through a   separate stab incision.    Now using clean gloves and instruments, we placed Seprafilm within the   abdomen.  We then closed the abdominal wall first at the colostomy site with   running 0 PDS sutures in the posterior and anterior rectus sheath and then in   the midline incision with running 0 PDS sutures tied in the midline.  We gave   a local anesthetic block at the level of the fascia with 60 mL of 0.25%   Marcaine with epinephrine.  We closed the deep tissues at the midline incision   with 3-0 Vicryl sutures and the skin with staples.  Drain was sewn to the   skin with a 2-0 nylon and hooked to bulb suction.  The colostomy site was   closed with interrupted 3-0 Vicryl in the subcutaneous layer and the skin with   a running 2-0 Vicryl pursestring suture around a quarter-inch Penrose drain.    With this completed, dry sterile dressings were placed.  The patient returned   to the PACU in stable condition.  All instruments counts were correct at the   end of the procedure.       ____________________________________     MD BLANCHE Muller / BRIGID    DD:  09/29/2020 12:38:09  DT:  09/29/2020 13:13:54    D#:  [id number]  Job#:  [id number]

## 2020-09-29 NOTE — ANESTHESIA PREPROCEDURE EVALUATION
71 yo F w/ hx of well controlled GERD, esophageal stricture, s/p Billroth I, spine surgery for scoliosis, nocturnal hypoxemia (uses 3L O2 x2-3/wk), dry mouth. NPO appropriate. Took her cymbalta and pilocarpine today. METS >4. Denies current chest pain, SOB or nausea.    Relevant Problems   CARDIAC   (+) Chronic migraine      GI   (+) Gastroesophageal reflux disease without esophagitis      Other   (+) Hunter's esophagus without dysplasia   (+) Chronic neck pain   (+) Colostomy in place (HCC)   (+) Esophageal stricture   (+) Nocturnal hypoxemia   (+) Scoliosis       Physical Exam    Airway   Mallampati: III  TM distance: >3 FB  Neck ROM: full       Cardiovascular - normal exam  Rhythm: regular  Rate: normal  (-) murmur     Dental - normal exam           Pulmonary - normal exam  Breath sounds clear to auscultation     Abdominal   (-) obese     Neurological - normal exam                 Anesthesia Plan    ASA 2       Plan - general       Airway plan will be ETT        Induction: intravenous    Postoperative Plan: Postoperative administration of opioids is intended.    Pertinent diagnostic labs and testing reviewed    Informed Consent:    Anesthetic plan and risks discussed with patient.    Use of blood products discussed with: patient whom consented to blood products.

## 2020-09-29 NOTE — PROGRESS NOTES
2 RN Skin Check Completed:  Patient has an island dressing midline. CDI.  Penrose drain to right abdomen with dressing in place, CDI.  NOEL drain with dressing in place to LLQ, dressing in place, CDI.  Patient's skin otherwise is pink and blanching without any other issues.

## 2020-09-29 NOTE — OR NURSING
Awake in PACU.  Vitals stable. SPO2 100% on 10L oxymask per ERAS order.  On monitors with alarms audible.    Called sister (Debbier) with update.

## 2020-09-29 NOTE — OR NURSING
Incentive spirometer teaching done with return demonstration.  Sat at EOB, stood, and transferred to hospital bed prior to leaving PACU.  Transported to floor with transport tech.

## 2020-09-30 LAB
ANION GAP SERPL CALC-SCNC: 12 MMOL/L (ref 7–16)
BUN SERPL-MCNC: 10 MG/DL (ref 8–22)
CALCIUM SERPL-MCNC: 8.4 MG/DL (ref 8.5–10.5)
CHLORIDE SERPL-SCNC: 102 MMOL/L (ref 96–112)
CO2 SERPL-SCNC: 23 MMOL/L (ref 20–33)
CREAT SERPL-MCNC: 0.7 MG/DL (ref 0.5–1.4)
ERYTHROCYTE [DISTWIDTH] IN BLOOD BY AUTOMATED COUNT: 47.5 FL (ref 35.9–50)
GLUCOSE SERPL-MCNC: 108 MG/DL (ref 65–99)
HCT VFR BLD AUTO: 37.8 % (ref 37–47)
HGB BLD-MCNC: 12.4 G/DL (ref 12–16)
MAGNESIUM SERPL-MCNC: 1.6 MG/DL (ref 1.5–2.5)
MCH RBC QN AUTO: 32.7 PG (ref 27–33)
MCHC RBC AUTO-ENTMCNC: 32.8 G/DL (ref 33.6–35)
MCV RBC AUTO: 99.7 FL (ref 81.4–97.8)
PLATELET # BLD AUTO: 211 K/UL (ref 164–446)
PMV BLD AUTO: 9.8 FL (ref 9–12.9)
POTASSIUM SERPL-SCNC: 4 MMOL/L (ref 3.6–5.5)
RBC # BLD AUTO: 3.79 M/UL (ref 4.2–5.4)
SODIUM SERPL-SCNC: 137 MMOL/L (ref 135–145)
WBC # BLD AUTO: 7.8 K/UL (ref 4.8–10.8)

## 2020-09-30 PROCEDURE — 3E02340 INTRODUCTION OF INFLUENZA VACCINE INTO MUSCLE, PERCUTANEOUS APPROACH: ICD-10-PCS | Performed by: SURGERY

## 2020-09-30 PROCEDURE — A9270 NON-COVERED ITEM OR SERVICE: HCPCS | Performed by: SURGERY

## 2020-09-30 PROCEDURE — 85027 COMPLETE CBC AUTOMATED: CPT

## 2020-09-30 PROCEDURE — 90471 IMMUNIZATION ADMIN: CPT

## 2020-09-30 PROCEDURE — 770006 HCHG ROOM/CARE - MED/SURG/GYN SEMI*

## 2020-09-30 PROCEDURE — 700101 HCHG RX REV CODE 250: Performed by: SURGERY

## 2020-09-30 PROCEDURE — 700111 HCHG RX REV CODE 636 W/ 250 OVERRIDE (IP): Performed by: SURGERY

## 2020-09-30 PROCEDURE — 700102 HCHG RX REV CODE 250 W/ 637 OVERRIDE(OP): Performed by: SURGERY

## 2020-09-30 PROCEDURE — 36415 COLL VENOUS BLD VENIPUNCTURE: CPT

## 2020-09-30 PROCEDURE — 700105 HCHG RX REV CODE 258: Performed by: SURGERY

## 2020-09-30 PROCEDURE — 83735 ASSAY OF MAGNESIUM: CPT

## 2020-09-30 PROCEDURE — 770001 HCHG ROOM/CARE - MED/SURG/GYN PRIV*

## 2020-09-30 PROCEDURE — 80048 BASIC METABOLIC PNL TOTAL CA: CPT

## 2020-09-30 PROCEDURE — 90662 IIV NO PRSV INCREASED AG IM: CPT | Performed by: SURGERY

## 2020-09-30 RX ORDER — DEXTROSE MONOHYDRATE, SODIUM CHLORIDE, AND POTASSIUM CHLORIDE 50; 1.49; 4.5 G/1000ML; G/1000ML; G/1000ML
INJECTION, SOLUTION INTRAVENOUS CONTINUOUS
Status: DISCONTINUED | OUTPATIENT
Start: 2020-09-30 | End: 2020-10-03

## 2020-09-30 RX ADMIN — DORZOLAMIDE HYDROCHLORIDE 1 DROP: 20 SOLUTION/ DROPS OPHTHALMIC at 17:23

## 2020-09-30 RX ADMIN — DULOXETINE HYDROCHLORIDE 120 MG: 60 CAPSULE, DELAYED RELEASE ORAL at 05:21

## 2020-09-30 RX ADMIN — FAMOTIDINE 20 MG: 20 TABLET, FILM COATED ORAL at 05:21

## 2020-09-30 RX ADMIN — DORZOLAMIDE HYDROCHLORIDE 1 DROP: 20 SOLUTION/ DROPS OPHTHALMIC at 10:59

## 2020-09-30 RX ADMIN — ENOXAPARIN SODIUM 40 MG: 40 INJECTION SUBCUTANEOUS at 05:21

## 2020-09-30 RX ADMIN — FAMOTIDINE 20 MG: 20 TABLET, FILM COATED ORAL at 17:23

## 2020-09-30 RX ADMIN — ACETAMINOPHEN 1000 MG: 500 TABLET ORAL at 05:21

## 2020-09-30 RX ADMIN — PILOCARPINE HYDROCHLORIDE 5 MG: 5 TABLET, FILM COATED ORAL at 17:23

## 2020-09-30 RX ADMIN — ACETAMINOPHEN 1000 MG: 500 TABLET ORAL at 01:26

## 2020-09-30 RX ADMIN — LATANOPROST 1 DROP: 50 SOLUTION OPHTHALMIC at 20:59

## 2020-09-30 RX ADMIN — TIMOLOL MALEATE 1 DROP: 5 SOLUTION/ DROPS OPHTHALMIC at 17:23

## 2020-09-30 RX ADMIN — TIMOLOL MALEATE 1 DROP: 5 SOLUTION/ DROPS OPHTHALMIC at 05:22

## 2020-09-30 RX ADMIN — TRAZODONE HYDROCHLORIDE 100 MG: 50 TABLET ORAL at 20:59

## 2020-09-30 RX ADMIN — KETOROLAC TROMETHAMINE 15 MG: 30 INJECTION, SOLUTION INTRAMUSCULAR; INTRAVENOUS at 14:53

## 2020-09-30 RX ADMIN — IPRATROPIUM BROMIDE 2 SPRAY: 21 SPRAY NASAL at 05:22

## 2020-09-30 RX ADMIN — IPRATROPIUM BROMIDE 2 SPRAY: 21 SPRAY NASAL at 17:23

## 2020-09-30 RX ADMIN — KETOROLAC TROMETHAMINE 15 MG: 30 INJECTION, SOLUTION INTRAMUSCULAR; INTRAVENOUS at 07:40

## 2020-09-30 RX ADMIN — PILOCARPINE HYDROCHLORIDE 5 MG: 5 TABLET, FILM COATED ORAL at 10:59

## 2020-09-30 RX ADMIN — ACETAMINOPHEN 1000 MG: 500 TABLET ORAL at 17:23

## 2020-09-30 RX ADMIN — MORPHINE SULFATE: 50 INJECTION, SOLUTION, CONCENTRATE INTRAVENOUS at 06:45

## 2020-09-30 RX ADMIN — PILOCARPINE HYDROCHLORIDE 5 MG: 5 TABLET, FILM COATED ORAL at 05:22

## 2020-09-30 RX ADMIN — ACETAMINOPHEN 1000 MG: 500 TABLET ORAL at 23:53

## 2020-09-30 RX ADMIN — INFLUENZA A VIRUS A/MICHIGAN/45/2015 X-275 (H1N1) ANTIGEN (FORMALDEHYDE INACTIVATED), INFLUENZA A VIRUS A/SINGAPORE/INFIMH-16-0019/2016 IVR-186 (H3N2) ANTIGEN (FORMALDEHYDE INACTIVATED), INFLUENZA B VIRUS B/PHUKET/3073/2013 ANTIGEN (FORMALDEHYDE INACTIVATED), AND INFLUENZA B VIRUS B/MARYLAND/15/2016 BX-69A ANTIGEN (FORMALDEHYDE INACTIVATED) 0.7 ML: 60; 60; 60; 60 INJECTION, SUSPENSION INTRAMUSCULAR at 18:05

## 2020-09-30 RX ADMIN — KETOROLAC TROMETHAMINE 15 MG: 30 INJECTION, SOLUTION INTRAMUSCULAR; INTRAVENOUS at 01:26

## 2020-09-30 RX ADMIN — POTASSIUM CHLORIDE, DEXTROSE MONOHYDRATE AND SODIUM CHLORIDE: 150; 5; 450 INJECTION, SOLUTION INTRAVENOUS at 09:13

## 2020-09-30 RX ADMIN — KETOROLAC TROMETHAMINE 15 MG: 30 INJECTION, SOLUTION INTRAMUSCULAR; INTRAVENOUS at 20:59

## 2020-09-30 RX ADMIN — SODIUM CHLORIDE, POTASSIUM CHLORIDE, SODIUM LACTATE AND CALCIUM CHLORIDE: 600; 310; 30; 20 INJECTION, SOLUTION INTRAVENOUS at 01:00

## 2020-09-30 RX ADMIN — DORZOLAMIDE HYDROCHLORIDE 1 DROP: 20 SOLUTION/ DROPS OPHTHALMIC at 05:22

## 2020-09-30 ASSESSMENT — PAIN DESCRIPTION - PAIN TYPE
TYPE: SURGICAL PAIN

## 2020-09-30 NOTE — CARE PLAN
Problem: Nutritional:  Goal: Achieve adequate nutritional intake  Description: Patient will consume 50% of meals, diet to advance past clear liquid.  Outcome: NOT MET

## 2020-09-30 NOTE — CARE PLAN
Problem: Pain Management  Goal: Pain level will decrease to patient's comfort goal  Outcome: PROGRESSING AS EXPECTED  Pt use of bolus button     Problem: Communication  Goal: The ability to communicate needs accurately and effectively will improve  Outcome: PROGRESSING AS EXPECTED  Pt calls appropriately

## 2020-09-30 NOTE — PROGRESS NOTES
Pt is A&O x4, calls appropriately  Pain 3/10   Denies nausea  Tolerating NPO  Incision to midline with CDI island dressing  LLQ NOEL, R penrose drain with CDI dressing  Geiger with an active order  + flatus  Last BM PTA  SCD's on  Bed alarm on, pt low fall risk per johnnie thapa  Reviewed plan of care with patient, bed in lowest position and locked, pt resting comfortably now, call light within reach, all needs met at this time. Interventions will be executed per plan of care

## 2020-09-30 NOTE — PROGRESS NOTES
"9/30  S:  70 y.o.female s/p Open Katz's Colostomy Reversal  POD# 1.  Patient doing well overnight, feels some hunger this morning, ambulating with assist x1 so far.  She is not yet having flatus or BM.      O:  /55   Pulse 76   Temp 36.8 °C (98.2 °F) (Temporal)   Resp 18   Ht 1.626 m (5' 4\")   Wt 53.5 kg (117 lb 15.1 oz)   SpO2 94%   I/O last 3 completed shifts:  In: 2276.4 [I.V.:2276.4]  Out: 1025 [Urine:690; Drains:315]          Am labs not completed as ordered    Alert and Oriented x3, No Acute Distress  Normal Respiratory Effort  Abdomen soft, appropriately tender  Incisions/Bandages clean/dry/intact  Extremities warm and well perfused  NOEL Output Serosanguinous- 315cc  Urine chandler, but 650cc post-op    A/P:  Pain control with PO meds  Diet clear liquids PO  Fluids continue  Ambulate tid and ad brandee  Geiger out today    "

## 2020-09-30 NOTE — PROGRESS NOTES
Bedside report received.  Assessment complete.  A&O x 4. Patient calls appropriately.  Patient mobilizes with one person assist. Bed alarm on.   Patient has 7/10 pain. Bolus button in use, however patient needs frequent reminding on how to use it and when a dose is available.  Denies N&V.NPO sips with medications at this time.  Surgical site to midline with dressing in place. NOEL  LLQ, Pen yolanda to RLQ.  + void, - flatus, - BM.  Patient denies SOB.  SCD's on.  Review plan with of care with patient. Call light and personal belongings with in reach. Hourly rounding in place. All needs met at this time.

## 2020-09-30 NOTE — DIETARY
"Nutrition services: Day 1 of admit.  Tamiko Cabrera is a 70 y.o. female with admitting DX of parastomal hernia, colostomy in place, para-ileostomy hernia.    Consult received for wt loss (2-13 lbs in 1 month), poor PO per admit screen (MST score of 2). Spoke with pt at bedside. Pt appeared adequately nourished. Pt reports a fair to low appetite PTA. Diet recently advanced to clear liquid. Pt states she is tolerating so far. Pt denied any recent wt loss, and stated UBW of ~118 lbs.     Assessment:  Height: 162.6 cm (5' 4\")  Weight: 53.5 kg (117 lb 15.1 oz)  Body mass index is 20.25 kg/m²., BMI classification: normal  Diet/Intake:     Evaluation:   1. POD#1 open Katz's Colostomy reversal with lysis of adhesions.  2. -flatus, -BM    Malnutrition Risk: Does not meet criteria per ASPEN guidelines at this time.    Recommendations/Plan:  1. Advance diet as tolerated per MD.  2. Encourage intake of meals.  3. Document intake of all meals as % taken in ADLs to provide interdisciplinary communication across all shifts.   4. Monitor weight.  5. Nutrition rep will continue to see patient for ongoing meal and snack preferences.   6. Boost Breeze oral nutrition supplement to optimize intake on clear liquid diet.    RD will continue to follow.            "

## 2020-10-01 ENCOUNTER — APPOINTMENT (OUTPATIENT)
Dept: RADIOLOGY | Facility: MEDICAL CENTER | Age: 70
DRG: 329 | End: 2020-10-01
Attending: SURGERY
Payer: MEDICARE

## 2020-10-01 LAB
ANION GAP SERPL CALC-SCNC: 11 MMOL/L (ref 7–16)
BUN SERPL-MCNC: 8 MG/DL (ref 8–22)
CALCIUM SERPL-MCNC: 8.6 MG/DL (ref 8.5–10.5)
CHLORIDE SERPL-SCNC: 101 MMOL/L (ref 96–112)
CO2 SERPL-SCNC: 23 MMOL/L (ref 20–33)
CREAT SERPL-MCNC: 0.64 MG/DL (ref 0.5–1.4)
ERYTHROCYTE [DISTWIDTH] IN BLOOD BY AUTOMATED COUNT: 47.6 FL (ref 35.9–50)
GLUCOSE SERPL-MCNC: 125 MG/DL (ref 65–99)
HCT VFR BLD AUTO: 38.4 % (ref 37–47)
HGB BLD-MCNC: 12.5 G/DL (ref 12–16)
MCH RBC QN AUTO: 32.6 PG (ref 27–33)
MCHC RBC AUTO-ENTMCNC: 32.6 G/DL (ref 33.6–35)
MCV RBC AUTO: 100 FL (ref 81.4–97.8)
PLATELET # BLD AUTO: 190 K/UL (ref 164–446)
PMV BLD AUTO: 9.8 FL (ref 9–12.9)
POTASSIUM SERPL-SCNC: 4.1 MMOL/L (ref 3.6–5.5)
RBC # BLD AUTO: 3.84 M/UL (ref 4.2–5.4)
SODIUM SERPL-SCNC: 135 MMOL/L (ref 135–145)
WBC # BLD AUTO: 6.9 K/UL (ref 4.8–10.8)

## 2020-10-01 PROCEDURE — 700111 HCHG RX REV CODE 636 W/ 250 OVERRIDE (IP): Performed by: SURGERY

## 2020-10-01 PROCEDURE — A9270 NON-COVERED ITEM OR SERVICE: HCPCS | Performed by: SURGERY

## 2020-10-01 PROCEDURE — 80048 BASIC METABOLIC PNL TOTAL CA: CPT

## 2020-10-01 PROCEDURE — 85027 COMPLETE CBC AUTOMATED: CPT

## 2020-10-01 PROCEDURE — 700102 HCHG RX REV CODE 250 W/ 637 OVERRIDE(OP): Performed by: SURGERY

## 2020-10-01 PROCEDURE — 36415 COLL VENOUS BLD VENIPUNCTURE: CPT

## 2020-10-01 PROCEDURE — 700111 HCHG RX REV CODE 636 W/ 250 OVERRIDE (IP)

## 2020-10-01 PROCEDURE — 700101 HCHG RX REV CODE 250: Performed by: SURGERY

## 2020-10-01 PROCEDURE — 770001 HCHG ROOM/CARE - MED/SURG/GYN PRIV*

## 2020-10-01 PROCEDURE — 700105 HCHG RX REV CODE 258: Performed by: SURGERY

## 2020-10-01 RX ORDER — HALOPERIDOL 5 MG/ML
INJECTION INTRAMUSCULAR
Status: COMPLETED
Start: 2020-10-01 | End: 2020-10-01

## 2020-10-01 RX ORDER — HALOPERIDOL 5 MG/ML
2-5 INJECTION INTRAMUSCULAR EVERY 4 HOURS PRN
Status: DISCONTINUED | OUTPATIENT
Start: 2020-10-01 | End: 2020-10-11

## 2020-10-01 RX ADMIN — POTASSIUM CHLORIDE, DEXTROSE MONOHYDRATE AND SODIUM CHLORIDE: 150; 5; 450 INJECTION, SOLUTION INTRAVENOUS at 06:09

## 2020-10-01 RX ADMIN — KETOROLAC TROMETHAMINE 15 MG: 30 INJECTION, SOLUTION INTRAMUSCULAR; INTRAVENOUS at 01:30

## 2020-10-01 RX ADMIN — MORPHINE SULFATE: 50 INJECTION, SOLUTION, CONCENTRATE INTRAVENOUS at 15:23

## 2020-10-01 RX ADMIN — HALOPERIDOL LACTATE 5 MG: 5 INJECTION, SOLUTION INTRAMUSCULAR at 22:42

## 2020-10-01 RX ADMIN — FAMOTIDINE 20 MG: 20 TABLET, FILM COATED ORAL at 17:23

## 2020-10-01 RX ADMIN — DORZOLAMIDE HYDROCHLORIDE 1 DROP: 20 SOLUTION/ DROPS OPHTHALMIC at 13:23

## 2020-10-01 RX ADMIN — PILOCARPINE HYDROCHLORIDE 5 MG: 5 TABLET, FILM COATED ORAL at 06:10

## 2020-10-01 RX ADMIN — DULOXETINE HYDROCHLORIDE 120 MG: 60 CAPSULE, DELAYED RELEASE ORAL at 06:10

## 2020-10-01 RX ADMIN — PILOCARPINE HYDROCHLORIDE 5 MG: 5 TABLET, FILM COATED ORAL at 13:23

## 2020-10-01 RX ADMIN — TIMOLOL MALEATE 1 DROP: 5 SOLUTION/ DROPS OPHTHALMIC at 06:10

## 2020-10-01 RX ADMIN — FAMOTIDINE 20 MG: 20 TABLET, FILM COATED ORAL at 06:10

## 2020-10-01 RX ADMIN — KETOROLAC TROMETHAMINE 15 MG: 30 INJECTION, SOLUTION INTRAMUSCULAR; INTRAVENOUS at 08:40

## 2020-10-01 RX ADMIN — PILOCARPINE HYDROCHLORIDE 5 MG: 5 TABLET, FILM COATED ORAL at 17:23

## 2020-10-01 RX ADMIN — IPRATROPIUM BROMIDE 2 SPRAY: 21 SPRAY NASAL at 17:25

## 2020-10-01 RX ADMIN — ACETAMINOPHEN 1000 MG: 500 TABLET ORAL at 17:23

## 2020-10-01 RX ADMIN — ACETAMINOPHEN 1000 MG: 500 TABLET ORAL at 06:09

## 2020-10-01 RX ADMIN — ENOXAPARIN SODIUM 40 MG: 40 INJECTION SUBCUTANEOUS at 06:10

## 2020-10-01 RX ADMIN — IPRATROPIUM BROMIDE 2 SPRAY: 21 SPRAY NASAL at 06:10

## 2020-10-01 RX ADMIN — HALOPERIDOL 5 MG: 5 INJECTION INTRAMUSCULAR at 22:42

## 2020-10-01 RX ADMIN — DORZOLAMIDE HYDROCHLORIDE 1 DROP: 20 SOLUTION/ DROPS OPHTHALMIC at 17:25

## 2020-10-01 RX ADMIN — KETOROLAC TROMETHAMINE 15 MG: 30 INJECTION, SOLUTION INTRAMUSCULAR; INTRAVENOUS at 17:24

## 2020-10-01 RX ADMIN — DORZOLAMIDE HYDROCHLORIDE 1 DROP: 20 SOLUTION/ DROPS OPHTHALMIC at 06:10

## 2020-10-01 ASSESSMENT — PAIN DESCRIPTION - PAIN TYPE: TYPE: SURGICAL PAIN

## 2020-10-01 NOTE — PROGRESS NOTES
"10/1  S:  70 y.o.female s/p Open Ktaz's Colostomy Reversal  POD# 2.  Patient doing fair, had some confusion this am, sitting up in bed and has been for some walks with assistance.  She is not passing flatus or stool at this time.  Pain is well controlled.      O:  /77   Pulse 73   Temp 36.4 °C (97.5 °F) (Temporal)   Resp 18   Ht 1.626 m (5' 4\")   Wt 53.5 kg (117 lb 15.1 oz)   SpO2 97%   I/O last 3 completed shifts:  In: 2835.5 [P.O.:720; I.V.:2115.5]  Out: 720 [Urine:550; Drains:170]  Recent Labs     09/30/20  1030 10/01/20  0627   SODIUM 137 135   POTASSIUM 4.0 4.1   CHLORIDE 102 101   CO2 23 23   GLUCOSE 108* 125*   BUN 10 8   CREATININE 0.70 0.64   CALCIUM 8.4* 8.6     Recent Labs     09/30/20  1030 10/01/20  0627   WBC 7.8 6.9   RBC 3.79* 3.84*   HEMOGLOBIN 12.4 12.5   HEMATOCRIT 37.8 38.4   MCV 99.7* 100.0*   MCH 32.7 32.6   MCHC 32.8* 32.6*   RDW 47.5 47.6   PLATELETCT 211 190   MPV 9.8 9.8       Alert and Oriented x3, No Acute Distress  Normal Respiratory Effort  Abdomen soft, appropriately tender  Incisions/Bandages clean/dry/intact  Extremities warm and well perfused  NOEL Output Serosanguinous- 145cc    A/P:  Pain control with PCA until tolerating PO, reduce dose of narcotics  Diet sips of clears only, awaiting bowel function  Fluids continue maintence  Ambulate tid and ad brandee  PT/OT  Ok to shower today  Discussed sleep/wake cycle maintenence with nursing    "

## 2020-10-01 NOTE — PROGRESS NOTES
Pt is A&O x4, calls appropriately  Pain 6/10, bolus button  Tolerating a clear liquid diet   Incision to midline with island dressing  LLQ NOEL drain  R penrose drain with CDI dressing  + Void  Last BM PTA, - flatus, -N/V  Up SBA with FWW  SCD's on  Bed alarm off, pt low fall risk per johnnie thapa  Reviewed plan of care with patient, bed in lowest position and locked, pt resting comfortably now, call light within reach, all needs met at this time. Interventions will be executed per plan of care

## 2020-10-01 NOTE — PROGRESS NOTES
Bedside report received.  Assessment complete.  A&O x 4. Patient calls appropriately.  Patient ambulates with standby assist w/FWW. Bed alarm n/a.   Patient has 6/10 pain. Pain managed with prescribed medications.  Denies N&V. Tolerating clear liquid diet.  Surgical R penrose drain with CDI dressing. LLQ NOEL to suction SS output noted.   + void, - flatus, PTA BM.  Patient denies SOB.  SCD's on.  Patient sitting up for breakfast.  Review plan with of care with patient. Call light and personal belongings with in reach. Hourly rounding in place. All needs met at this time.

## 2020-10-01 NOTE — CARE PLAN
Problem: Communication  Goal: The ability to communicate needs accurately and effectively will improve  Outcome: PROGRESSING AS EXPECTED     Problem: Knowledge Deficit  Goal: Knowledge of the prescribed therapeutic regimen will improve  Outcome: PROGRESSING AS EXPECTED  Note: Pt udpdated on POC pt verbalizes understanding.

## 2020-10-01 NOTE — CARE PLAN
Problem: Pain Management  Goal: Pain level will decrease to patient's comfort goal  Outcome: PROGRESSING AS EXPECTED  Pt managed through bolus button     Problem: Communication  Goal: The ability to communicate needs accurately and effectively will improve  Outcome: PROGRESSING AS EXPECTED  Pt calls appropriately

## 2020-10-02 LAB
ANION GAP SERPL CALC-SCNC: 11 MMOL/L (ref 7–16)
BUN SERPL-MCNC: 5 MG/DL (ref 8–22)
CALCIUM SERPL-MCNC: 9.1 MG/DL (ref 8.5–10.5)
CHLORIDE SERPL-SCNC: 100 MMOL/L (ref 96–112)
CO2 SERPL-SCNC: 22 MMOL/L (ref 20–33)
CREAT SERPL-MCNC: 0.46 MG/DL (ref 0.5–1.4)
CRP SERPL HS-MCNC: 7.1 MG/DL (ref 0–0.75)
ERYTHROCYTE [DISTWIDTH] IN BLOOD BY AUTOMATED COUNT: 43.7 FL (ref 35.9–50)
GLUCOSE SERPL-MCNC: 149 MG/DL (ref 65–99)
HCT VFR BLD AUTO: 35.8 % (ref 37–47)
HGB BLD-MCNC: 12.3 G/DL (ref 12–16)
MCH RBC QN AUTO: 32.6 PG (ref 27–33)
MCHC RBC AUTO-ENTMCNC: 34.4 G/DL (ref 33.6–35)
MCV RBC AUTO: 95 FL (ref 81.4–97.8)
PLATELET # BLD AUTO: 237 K/UL (ref 164–446)
PMV BLD AUTO: 10.1 FL (ref 9–12.9)
POTASSIUM SERPL-SCNC: 4 MMOL/L (ref 3.6–5.5)
RBC # BLD AUTO: 3.77 M/UL (ref 4.2–5.4)
SODIUM SERPL-SCNC: 133 MMOL/L (ref 135–145)
WBC # BLD AUTO: 9.1 K/UL (ref 4.8–10.8)

## 2020-10-02 PROCEDURE — 770001 HCHG ROOM/CARE - MED/SURG/GYN PRIV*

## 2020-10-02 PROCEDURE — 700111 HCHG RX REV CODE 636 W/ 250 OVERRIDE (IP): Performed by: SURGERY

## 2020-10-02 PROCEDURE — 97161 PT EVAL LOW COMPLEX 20 MIN: CPT

## 2020-10-02 PROCEDURE — 97165 OT EVAL LOW COMPLEX 30 MIN: CPT

## 2020-10-02 PROCEDURE — 700102 HCHG RX REV CODE 250 W/ 637 OVERRIDE(OP): Performed by: SURGERY

## 2020-10-02 PROCEDURE — 36415 COLL VENOUS BLD VENIPUNCTURE: CPT

## 2020-10-02 PROCEDURE — 80048 BASIC METABOLIC PNL TOTAL CA: CPT

## 2020-10-02 PROCEDURE — 85027 COMPLETE CBC AUTOMATED: CPT

## 2020-10-02 PROCEDURE — 86140 C-REACTIVE PROTEIN: CPT

## 2020-10-02 PROCEDURE — 700101 HCHG RX REV CODE 250: Performed by: SURGERY

## 2020-10-02 PROCEDURE — A9270 NON-COVERED ITEM OR SERVICE: HCPCS | Performed by: SURGERY

## 2020-10-02 RX ORDER — KETOROLAC TROMETHAMINE 30 MG/ML
15 INJECTION, SOLUTION INTRAMUSCULAR; INTRAVENOUS EVERY 6 HOURS PRN
Status: DISPENSED | OUTPATIENT
Start: 2020-10-02 | End: 2020-10-07

## 2020-10-02 RX ADMIN — DORZOLAMIDE HYDROCHLORIDE 1 DROP: 20 SOLUTION/ DROPS OPHTHALMIC at 05:29

## 2020-10-02 RX ADMIN — PILOCARPINE HYDROCHLORIDE 5 MG: 5 TABLET, FILM COATED ORAL at 17:10

## 2020-10-02 RX ADMIN — TIMOLOL MALEATE 1 DROP: 5 SOLUTION/ DROPS OPHTHALMIC at 17:09

## 2020-10-02 RX ADMIN — IPRATROPIUM BROMIDE 2 SPRAY: 21 SPRAY NASAL at 17:09

## 2020-10-02 RX ADMIN — ONDANSETRON 4 MG: 2 INJECTION INTRAMUSCULAR; INTRAVENOUS at 05:43

## 2020-10-02 RX ADMIN — TRAZODONE HYDROCHLORIDE 100 MG: 50 TABLET ORAL at 20:03

## 2020-10-02 RX ADMIN — TIMOLOL MALEATE 1 DROP: 5 SOLUTION/ DROPS OPHTHALMIC at 05:29

## 2020-10-02 RX ADMIN — DORZOLAMIDE HYDROCHLORIDE 1 DROP: 20 SOLUTION/ DROPS OPHTHALMIC at 17:09

## 2020-10-02 RX ADMIN — FAMOTIDINE 20 MG: 20 TABLET, FILM COATED ORAL at 17:08

## 2020-10-02 RX ADMIN — KETOROLAC TROMETHAMINE 15 MG: 30 INJECTION, SOLUTION INTRAMUSCULAR; INTRAVENOUS at 08:26

## 2020-10-02 RX ADMIN — POTASSIUM CHLORIDE, DEXTROSE MONOHYDRATE AND SODIUM CHLORIDE: 150; 5; 450 INJECTION, SOLUTION INTRAVENOUS at 05:42

## 2020-10-02 RX ADMIN — ONDANSETRON 4 MG: 2 INJECTION INTRAMUSCULAR; INTRAVENOUS at 10:51

## 2020-10-02 RX ADMIN — DULOXETINE HYDROCHLORIDE 120 MG: 60 CAPSULE, DELAYED RELEASE ORAL at 05:29

## 2020-10-02 RX ADMIN — PILOCARPINE HYDROCHLORIDE 5 MG: 5 TABLET, FILM COATED ORAL at 12:38

## 2020-10-02 RX ADMIN — FAMOTIDINE 20 MG: 20 TABLET, FILM COATED ORAL at 05:29

## 2020-10-02 RX ADMIN — LATANOPROST 1 DROP: 50 SOLUTION OPHTHALMIC at 20:03

## 2020-10-02 RX ADMIN — POTASSIUM CHLORIDE, DEXTROSE MONOHYDRATE AND SODIUM CHLORIDE: 150; 5; 450 INJECTION, SOLUTION INTRAVENOUS at 20:03

## 2020-10-02 RX ADMIN — IPRATROPIUM BROMIDE 2 SPRAY: 21 SPRAY NASAL at 05:29

## 2020-10-02 RX ADMIN — PILOCARPINE HYDROCHLORIDE 5 MG: 5 TABLET, FILM COATED ORAL at 05:29

## 2020-10-02 RX ADMIN — KETOROLAC TROMETHAMINE 15 MG: 30 INJECTION, SOLUTION INTRAMUSCULAR; INTRAVENOUS at 17:09

## 2020-10-02 RX ADMIN — DORZOLAMIDE HYDROCHLORIDE 1 DROP: 20 SOLUTION/ DROPS OPHTHALMIC at 12:37

## 2020-10-02 ASSESSMENT — COGNITIVE AND FUNCTIONAL STATUS - GENERAL
SUGGESTED CMS G CODE MODIFIER DAILY ACTIVITY: CK
PERSONAL GROOMING: A LITTLE
DRESSING REGULAR UPPER BODY CLOTHING: A LITTLE
TOILETING: A LITTLE
MOBILITY SCORE: 24
HELP NEEDED FOR BATHING: A LITTLE
DRESSING REGULAR LOWER BODY CLOTHING: A LITTLE
SUGGESTED CMS G CODE MODIFIER MOBILITY: CH
DAILY ACTIVITIY SCORE: 19

## 2020-10-02 ASSESSMENT — GAIT ASSESSMENTS
DISTANCE (FEET): 250
GAIT LEVEL OF ASSIST: SUPERVISED

## 2020-10-02 ASSESSMENT — PAIN DESCRIPTION - PAIN TYPE
TYPE: SURGICAL PAIN
TYPE: SURGICAL PAIN
TYPE: SURGICAL PAIN;ACUTE PAIN

## 2020-10-02 ASSESSMENT — ACTIVITIES OF DAILY LIVING (ADL): TOILETING: INDEPENDENT

## 2020-10-02 NOTE — PROGRESS NOTES
Pt vomited twice on night shift.   Once around 0400 and once during med pass after taking 3 tabs with 2 tabs remaining. Pt finally agreed to antiemetics, medicated with PRN Zofran.    Vomit is 30-60 ml, green/ yellow. Pt remains nauseas after bouts of emesis.

## 2020-10-02 NOTE — PROGRESS NOTES
"Pt A&O x0. Confused, delusional, aggressive, non-compliant, unsafe attempts at mobilization.     Vitals: /96   Pulse 90   Temp 36.7 °C (98 °F) (Temporal)   Resp 15   Ht 1.626 m (5' 4\")   Wt 53.5 kg (117 lb 15.1 oz)   SpO2 92%   BMI 20.25 kg/m²     Denies having any pain. Reports tenderness to abdomen upon palpation. Refusing any pain interventions.     Neuro: BAUTISTA. Denies new onset of numbness/ tingling.    Cardiac: Denies new onset of chest pain.    Respiratory: Lungs sound diminished in bases. Refused to use IS. On RA. Refusing . Denies SOB.    GI: Abdomen rounded, distended, semi-firm, tender. Hypoactive/ distant bowel sounds, - flatus, - BM today. On clear liquid diet. - nausea/ vomiting.    : Pt voiding blood with blood clots, provided updated.     MSK: Pt up to bathroom SBA, refusing assistance. FWW.     Integumentary: Midline abdominal surgical incision CDI, approximated, PARIS, staples, no drainage noted. RLQ Penrose drain dressing CDI, observed, no drainage noted. Left quadrant NOEL drain sutured in place, PARIS, no drainage. NOEL to bulb suction, serosanguinous drainage ranging from 10 ml - 80 ml output Q4 hours.     Labs noted.    Fall precautions in place: Bed locked in lowest position, Upper bed rails up, treaded socks in place, personal belongings within reach, call light within reach, appropriate mobility signs in place, + bed alarm. Pt calls appropriately.     Pt updated on POC.   "

## 2020-10-02 NOTE — THERAPY
"Occupational Therapy   Initial Evaluation     Patient Name: Tamiko Cabrera  Age:  70 y.o., Sex:  female  Medical Record #: [id number]  Today's Date: 10/2/2020     Precautions  Precautions: Fall Risk  Comments: abdominal precautions s/p colostomy reversal    Assessment  Patient is 70 y.o. 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 PMH significant for GERD, chronic neck pain, scoliosis.  Pt educated on abdominal precautions, and completed ADLs and functional mobility at supervision demo good carryover of precautions- pt able to raise each foot to opposite knee for LB dressing. Pt reports 24/7 support from sister upon return home, and would benefit from one additional OT visit prior to d/c to assess any DME needs prior to return home. Pt would benefit from ambulating around unit and eating meals in chair for increased functional activity tolerance, nursing informed.     Plan    Recommend Occupational Therapy: d/c needs only    DC Equipment Recommendations: Bed Side Commode(to put over toilet)  Discharge Recommendations: Anticipate that the patient will have no further occupational therapy needs after discharge from the hospital     Subjective    \"My sister cooks, but I do the laundry....she can do it when I first get home\"     Objective     10/02/20 1448   Prior Living Situation   Prior Services None   Housing / Facility 1 Story House   Steps Into Home 3   Bathroom Set up Bathtub / Shower Combination;Tub Transfer Bench  (comfort height toilet per pt report)   Equipment Owned Tub Transfer Bench;4-Wheel Walker  (handheld shower head)   Lives with - Patient's Self Care Capacity Other (Comments)  (sister)   Comments pt reports sister is able to provide 24/7 support for ADLs/IADLs as needed   Prior Level of ADL Function   Self Feeding Independent   Grooming / Hygiene Independent   Bathing Independent   Dressing Independent   Toileting Independent   Prior Level of IADL Function "   Medication Management Independent   Laundry Independent   Kitchen Mobility Independent   Finances Independent   Home Management Independent   Shopping Independent   Prior Level Of Mobility Independent Without Device in Community;Independent Without Device in Home   Cognition    Cognition / Consciousness WDL   Level of Consciousness Alert   Comments Pt pleasant and cooperative, per chart review pt confused/disoriented earlier, however no deficits noted during OT evaluation   Strength Upper Body   Upper Body Strength  X   Gross Strength Generalized Weakness, Equal Bilaterally.    Balance Assessment   Sitting Balance (Static) Fair +   Sitting Balance (Dynamic) Fair +   Standing Balance (Static) Fair   Standing Balance (Dynamic) Fair -   Weight Shift Sitting Fair   Weight Shift Standing Fair   Comments no AD per PT rec   Bed Mobility    Supine to Sit Supervised   Sit to Supine   (seated in chair at end of session)   Scooting Supervised   Comments HOB elevated, pt reports she has an electric bed at home   ADL Assessment   Grooming Supervision;Standing  (wash face)   Upper Body Dressing Minimal Assist  (doff/don hospital gown, assist needed d/t IV )   Lower Body Dressing Supervision  (don underwear/pants, able to raise leg to opposite knee)   Toileting   (BM with PT prior to OT evaluation)   Comments PT reporting pt able to complete toileting hygiene at supervision. Pt educated on abdominal precautions and demo good carryover throughout ADLs/mobility. Pt educated on 3-in-1- commode for toilet at home, and pt verbalized undersanding reporting she'll buy one at her local pharmacy   Functional Mobility   Sit to Stand Supervised   Bed, Chair, Wheelchair Transfer Minimal Assist   Transfer Method Stand Step  (no AD)   Mobility bed>sink>bedside chair   Activity Tolerance   Sitting in Chair seated in chair at end of session   Sitting Edge of Bed 6 mins   Standing 5 mins   Anticipated Discharge Equipment and Recommendations   ELVIA  Equipment Recommendations Bed Side Commode  (to put over toilet)   Discharge Recommendations Anticipate that the patient will have no further occupational therapy needs after discharge from the hospital

## 2020-10-02 NOTE — CARE PLAN
Problem: Urinary Elimination:  Goal: Ability to reestablish a normal urinary elimination pattern will improve  Outcome: PROGRESSING AS EXPECTED  Note: +void this AM     Problem: Safety  Goal: Will remain free from falls  Outcome: PROGRESSING AS EXPECTED  Note: Bed alarm on, non skid footwear on. Pt educated on fall risk.

## 2020-10-02 NOTE — DIETARY
Nutrition Services: Update   Day 3 of admit.  Tamiko Cabrera is a 70 y.o. female with admitting DX of PARASTOMAL HERNIA, COLOSTOMY IN PLACE  Para-ileostomy hernia (HCC)    Wt: 53.5 kg kg via Stand up scale - No weight trend established at this time as LOS x 3 days.     Pt is currently on CLD diet since 9/30. RD able to briefly visit at bedside. Pt states is not eating very well, mentions is able to tolerate jello this morning, though has not tried much of anything else. States did vomit this morning, and RN provided nausea medication about 15 minutes ago. Pt states overall nausea is improving, but appetite is not there yet.     RD observes unopened Boost Breeze on tray table. Pt states has not yet tried. RD discussed importance of nutrition supplements at this time, as provides protein and calories to help maintain strength. RD encouraged small sips at a time, pt verbalizes understanding and agrees to trial.     Malnutrition Risk: Noted does not meet criteria at this time.     Recommendations/Plan:  1. Continue Boost Breeze TID  2. Encourage intake of meals, advance beyond CLD as medically feasible.   3. Document intake of all meals as % taken in ADL's to provide interdisciplinary communication across all shifts.   4. Monitor weight.  5. Nutrition rep will continue to see patient for ongoing meal and snack preferences.    RD following

## 2020-10-02 NOTE — PROGRESS NOTES
"Updated Dr. Boyce on pt's declining mental state and increased aggression/ non-compliance. Received orders for PRN Haldol. Updated on pt's bloody with clots urinary output.    Administered Haldol with assistance from security. Pt did not fight staff. Pt agreed to minimal assessment of abdomen. Pt answered a few assessment questions.     Pt continues to call staff be the name of personal family members. Pt is hallucinating staff to be family members. Pt states, \"You are all going to kill me\".     PCA stopped at beginning of new onset confusion/ decreased orientation. Provider updated.   "

## 2020-10-02 NOTE — PROGRESS NOTES
"10/2  S:  70 y.o.female s/p Open irving's Reversal  POD# 3.  Patient had delirium last night, required Haldol administration and posey vest restraint.  Much better this am, oriented again and calm and cooperative with care.  She had some nausea and emesis this am though but did have a small bloody BM today.  She is tolerating sips of clears at present.      O:  /94   Pulse 99   Temp 36.7 °C (98.1 °F) (Temporal)   Resp 17   Ht 1.626 m (5' 4\")   Wt 53.5 kg (117 lb 15.1 oz)   SpO2 92%   I/O last 3 completed shifts:  In: 1754.5 [P.O.:900; I.V.:854.5]  Out: 280 [Drains:280]  Recent Labs     09/30/20  1030 10/01/20  0627 10/02/20  0654   SODIUM 137 135 133*   POTASSIUM 4.0 4.1 4.0   CHLORIDE 102 101 100   CO2 23 23 22   GLUCOSE 108* 125* 149*   BUN 10 8 5*   CREATININE 0.70 0.64 0.46*   CALCIUM 8.4* 8.6 9.1     Recent Labs     09/30/20  1030 10/01/20  0627 10/02/20  0654   WBC 7.8 6.9 9.1   RBC 3.79* 3.84* 3.77*   HEMOGLOBIN 12.4 12.5 12.3   HEMATOCRIT 37.8 38.4 35.8*   MCV 99.7* 100.0* 95.0   MCH 32.7 32.6 32.6   MCHC 32.8* 32.6* 34.4   RDW 47.5 47.6 43.7   PLATELETCT 211 190 237   MPV 9.8 9.8 10.1       Alert and Oriented x3, No Acute Distress  Normal Respiratory Effort  Abdomen soft, appropriately tender  Incisions/Bandages clean/dry/intact  Extremities warm and well perfused  NOEL Output Serosanguinous    A/P:  Pain control with PO meds, avoid narcotics  Diet sips of clears only until bowel function resumts  Fluids continue maintence  Ambulate tid and ad brandee  PT/OT following  Restraints only as needed, Haldol Prn for delirium, sleep/wake cycle reinforcement.    "

## 2020-10-02 NOTE — PROGRESS NOTES
Bedside report received.  Assessment complete.  A&O x 3. Pt disoriented to event.  Patient ambulates with standby assist. Bed alarm on.   Patient has 6/10 pain. Pain managed with prescribed medications.  Denies vomiting this AM, pt states slight nausea declines medicaition. Poorly tolerating clear liquid diet.   Surgical R penrose drain with CDI dressing, small sanguinous drainage . LLQ NOEL to suction SS output noted.   + void, - flatus, PTA BM. Distant hypoactive bowel sounds.   Tender and distended abdomen.   Patient denies SOB.  SCD's on.  Patient sitting up for breakfast.  Review plan with of care with patient. Call light and personal belongings with in reach. Hourly rounding in place. All needs met at this time.

## 2020-10-02 NOTE — THERAPY
Physical Therapy   Initial Evaluation     Patient Name: Tamiko Cabrera  Age:  70 y.o., Sex:  female  Medical Record #: [id number]  Today's Date: 10/2/2020     Precautions: Fall Risk    Assessment  Patient is 70 y.o. female with a diagnosis of Parastomal hernia.  Additional factors influencing patient status / progress : Pt was confused this am, now clear and following commands. Pt was supervised for OOB, transfers and ambulation using no AD. Pt lives with sister who can assist as needed.      Plan    Recommend Physical Therapy for Evaluation only   DC Equipment Recommendations: None  Discharge Recommendations: Anticipate that the patient will have no further physical therapy needs after discharge from the hospital. Patient will not be actively followed for physical therapy services at this time, however may be seen if requested by physician for 1 more visit within 30 days to address any discharge or equipment needs          Objective       10/02/20 1423   Prior Living Situation   Prior Services None   Housing / Facility 1 Story House   Steps Into Home 3   Steps In Home 0   Rail None   Elevator No   Equipment Owned None   Lives with - Patient's Self Care Capacity Other (Comments)  (sister, home as needed, helps as needed. )   Prior Level of Functional Mobility   Bed Mobility Independent   Transfer Status Independent   Ambulation Independent   Assistive Devices Used None   Stairs Independent   Cognition    Level of Consciousness Alert   Comments following commands. Was confused this am, much clearer now.    Gait Analysis   Gait Level Of Assist Supervised   Assistive Device None   Distance (Feet) 250   # of Stairs Climbed 3   Level of Assist with Stairs Supervised   Bed Mobility    Supine to Sit Supervised   Sit to Supine Supervised   Scooting Supervised   Rolling Supervised   Functional Mobility   Sit to Stand Supervised   Toilet Transfers Supervised  (pt asks to use toilet, has watery BM, nsg updated. )   Transfer  Method Stand Pivot   Anticipated Discharge Equipment and Recommendations   DC Equipment Recommendations None   Discharge Recommendations Anticipate that the patient will have no further physical therapy needs after discharge from the hospital

## 2020-10-02 NOTE — CARE PLAN
Problem: Nutritional:  Goal: Achieve adequate nutritional intake  Description: Patient will consume 50% of meals, diet to advance past clear liquid  Outcome: PROGRESSING SLOWER THAN EXPECTED  Note: See RD note

## 2020-10-02 NOTE — CARE PLAN
Problem: Urinary Elimination:  Goal: Ability to reestablish a normal urinary elimination pattern will improve  Outcome: PROGRESSING SLOWER THAN EXPECTED  Note: Pt is voiding blood with clots.      Problem: Psychosocial Needs:  Goal: Level of anxiety will decrease  Outcome: PROGRESSING SLOWER THAN EXPECTED  Flowsheets (Taken 10/1/2020 2200)  Patient Behaviors:   Agitated   Aggressive Physically   Angry   Anxious   Confused   Delusional   Non Compliant   Restless   Uncooperative

## 2020-10-03 ENCOUNTER — APPOINTMENT (OUTPATIENT)
Dept: RADIOLOGY | Facility: MEDICAL CENTER | Age: 70
DRG: 329 | End: 2020-10-03
Attending: SURGERY
Payer: MEDICARE

## 2020-10-03 LAB
ANION GAP SERPL CALC-SCNC: 12 MMOL/L (ref 7–16)
BUN SERPL-MCNC: 3 MG/DL (ref 8–22)
CALCIUM SERPL-MCNC: 8.7 MG/DL (ref 8.5–10.5)
CHLORIDE SERPL-SCNC: 94 MMOL/L (ref 96–112)
CO2 SERPL-SCNC: 24 MMOL/L (ref 20–33)
CREAT SERPL-MCNC: 0.36 MG/DL (ref 0.5–1.4)
CRP SERPL HS-MCNC: 3.75 MG/DL (ref 0–0.75)
CRP SERPL HS-MCNC: 4.17 MG/DL (ref 0–0.75)
ERYTHROCYTE [DISTWIDTH] IN BLOOD BY AUTOMATED COUNT: 42.5 FL (ref 35.9–50)
GLUCOSE SERPL-MCNC: 162 MG/DL (ref 65–99)
HCT VFR BLD AUTO: 34.4 % (ref 37–47)
HGB BLD-MCNC: 12.1 G/DL (ref 12–16)
MCH RBC QN AUTO: 32.4 PG (ref 27–33)
MCHC RBC AUTO-ENTMCNC: 35.2 G/DL (ref 33.6–35)
MCV RBC AUTO: 92.2 FL (ref 81.4–97.8)
PLATELET # BLD AUTO: 261 K/UL (ref 164–446)
PMV BLD AUTO: 9.8 FL (ref 9–12.9)
POTASSIUM SERPL-SCNC: 3.2 MMOL/L (ref 3.6–5.5)
RBC # BLD AUTO: 3.73 M/UL (ref 4.2–5.4)
SODIUM SERPL-SCNC: 130 MMOL/L (ref 135–145)
WBC # BLD AUTO: 13.1 K/UL (ref 4.8–10.8)

## 2020-10-03 PROCEDURE — 85027 COMPLETE CBC AUTOMATED: CPT

## 2020-10-03 PROCEDURE — A9270 NON-COVERED ITEM OR SERVICE: HCPCS | Performed by: SURGERY

## 2020-10-03 PROCEDURE — 770001 HCHG ROOM/CARE - MED/SURG/GYN PRIV*

## 2020-10-03 PROCEDURE — 86140 C-REACTIVE PROTEIN: CPT

## 2020-10-03 PROCEDURE — 700111 HCHG RX REV CODE 636 W/ 250 OVERRIDE (IP): Performed by: SURGERY

## 2020-10-03 PROCEDURE — 700101 HCHG RX REV CODE 250: Performed by: SURGERY

## 2020-10-03 PROCEDURE — 700102 HCHG RX REV CODE 250 W/ 637 OVERRIDE(OP): Performed by: SURGERY

## 2020-10-03 PROCEDURE — 36415 COLL VENOUS BLD VENIPUNCTURE: CPT

## 2020-10-03 PROCEDURE — 80048 BASIC METABOLIC PNL TOTAL CA: CPT

## 2020-10-03 RX ORDER — DEXTROSE MONOHYDRATE, SODIUM CHLORIDE, AND POTASSIUM CHLORIDE 50; 1.49; 9 G/1000ML; G/1000ML; G/1000ML
INJECTION, SOLUTION INTRAVENOUS CONTINUOUS
Status: DISCONTINUED | OUTPATIENT
Start: 2020-10-03 | End: 2020-10-04

## 2020-10-03 RX ADMIN — ENOXAPARIN SODIUM 40 MG: 40 INJECTION SUBCUTANEOUS at 05:51

## 2020-10-03 RX ADMIN — KETOROLAC TROMETHAMINE 15 MG: 30 INJECTION, SOLUTION INTRAMUSCULAR; INTRAVENOUS at 16:22

## 2020-10-03 RX ADMIN — PILOCARPINE HYDROCHLORIDE 5 MG: 5 TABLET, FILM COATED ORAL at 05:51

## 2020-10-03 RX ADMIN — LATANOPROST 1 DROP: 50 SOLUTION OPHTHALMIC at 20:10

## 2020-10-03 RX ADMIN — TRAZODONE HYDROCHLORIDE 100 MG: 50 TABLET ORAL at 20:10

## 2020-10-03 RX ADMIN — KETOROLAC TROMETHAMINE 15 MG: 30 INJECTION, SOLUTION INTRAMUSCULAR; INTRAVENOUS at 09:54

## 2020-10-03 RX ADMIN — IPRATROPIUM BROMIDE 2 SPRAY: 21 SPRAY NASAL at 05:51

## 2020-10-03 RX ADMIN — IPRATROPIUM BROMIDE 2 SPRAY: 21 SPRAY NASAL at 17:35

## 2020-10-03 RX ADMIN — FAMOTIDINE 20 MG: 20 TABLET, FILM COATED ORAL at 17:35

## 2020-10-03 RX ADMIN — DULOXETINE HYDROCHLORIDE 120 MG: 60 CAPSULE, DELAYED RELEASE ORAL at 05:51

## 2020-10-03 RX ADMIN — TIMOLOL MALEATE 1 DROP: 5 SOLUTION/ DROPS OPHTHALMIC at 17:35

## 2020-10-03 RX ADMIN — DORZOLAMIDE HYDROCHLORIDE 1 DROP: 20 SOLUTION/ DROPS OPHTHALMIC at 17:35

## 2020-10-03 RX ADMIN — PILOCARPINE HYDROCHLORIDE 5 MG: 5 TABLET, FILM COATED ORAL at 13:37

## 2020-10-03 RX ADMIN — POTASSIUM CHLORIDE, DEXTROSE MONOHYDRATE AND SODIUM CHLORIDE: 150; 5; 900 INJECTION, SOLUTION INTRAVENOUS at 10:20

## 2020-10-03 RX ADMIN — ONDANSETRON 4 MG: 2 INJECTION INTRAMUSCULAR; INTRAVENOUS at 17:35

## 2020-10-03 RX ADMIN — DORZOLAMIDE HYDROCHLORIDE 1 DROP: 20 SOLUTION/ DROPS OPHTHALMIC at 13:37

## 2020-10-03 RX ADMIN — FAMOTIDINE 20 MG: 20 TABLET, FILM COATED ORAL at 05:51

## 2020-10-03 RX ADMIN — TIMOLOL MALEATE 1 DROP: 5 SOLUTION/ DROPS OPHTHALMIC at 05:51

## 2020-10-03 RX ADMIN — POTASSIUM CHLORIDE, DEXTROSE MONOHYDRATE AND SODIUM CHLORIDE: 150; 5; 900 INJECTION, SOLUTION INTRAVENOUS at 20:10

## 2020-10-03 RX ADMIN — PILOCARPINE HYDROCHLORIDE 5 MG: 5 TABLET, FILM COATED ORAL at 17:35

## 2020-10-03 RX ADMIN — DORZOLAMIDE HYDROCHLORIDE 1 DROP: 20 SOLUTION/ DROPS OPHTHALMIC at 05:51

## 2020-10-03 ASSESSMENT — PAIN DESCRIPTION - PAIN TYPE
TYPE: SURGICAL PAIN

## 2020-10-03 ASSESSMENT — FIBROSIS 4 INDEX: FIB4 SCORE: 1.31

## 2020-10-03 NOTE — PROGRESS NOTES
Report received. Assessment complete.  AAOx4. Patient calls appropriately.  Pt reports pain 8/10. Heat pack applied, declining alternate interventions. No further interventions needed at this time. Will continue to monitor.   Pt denies N/V, SOB, or chest pain.  BAUTISTA, ambulatory x SBA.  + void, hypoactive bowel sounds, LBM 10/02  Pt is tolerating clear diet.   RLQ penrose drain in place, covered with gauze, CDI  LLQ NOEL drain in place, draining serosanguineous fluid  MLI PARIS with staples, site is reddened, but no drainage noted  Abdomen is distended, rounded, semi-firm and tender.     Plan of care discussed with patient. Fall precautions in place. Belongings and call light within reach. Educated patient to call for assistance as needed. All needs met at this time.

## 2020-10-03 NOTE — CARE PLAN
Problem: Urinary Elimination:  Goal: Ability to reestablish a normal urinary elimination pattern will improve  Outcome: PROGRESSING AS EXPECTED     Problem: Safety  Goal: Will remain free from falls  Outcome: PROGRESSING AS EXPECTED

## 2020-10-03 NOTE — PROGRESS NOTES
"10/3  S:  70 y.o.female s/p Open Katz's Colostomy Reversal  POD# 4.  Patient with a slow recovery, not yet ready to eat and bowel function not returned.  Ambulating some, delirium better overnight with minimal narcotics as ordered.  Had some emesis again this am.      O:  /102   Pulse 88   Temp 37.2 °C (98.9 °F) (Temporal)   Resp 18   Ht 1.626 m (5' 4\")   Wt 53.5 kg (117 lb 15.1 oz)   SpO2 91%   I/O last 3 completed shifts:  In: 2656.7 [P.O.:860; I.V.:1796.7]  Out: 1370 [Urine:1150; Emesis:20; Drains:200]  Recent Labs     10/01/20  0627 10/02/20  0654 10/03/20  0539   SODIUM 135 133* 130*   POTASSIUM 4.1 4.0 3.2*   CHLORIDE 101 100 94*   CO2 23 22 24   GLUCOSE 125* 149* 162*   BUN 8 5* 3*   CREATININE 0.64 0.46* 0.36*   CALCIUM 8.6 9.1 8.7     Recent Labs     10/01/20  0627 10/02/20  0654 10/03/20  0539   WBC 6.9 9.1 13.1*   RBC 3.84* 3.77* 3.73*   HEMOGLOBIN 12.5 12.3 12.1   HEMATOCRIT 38.4 35.8* 34.4*   .0* 95.0 92.2   MCH 32.6 32.6 32.4   MCHC 32.6* 34.4 35.2*   RDW 47.6 43.7 42.5   PLATELETCT 190 237 261   MPV 9.8 10.1 9.8       Alert and Oriented x3, No Acute Distress  Normal Respiratory Effort  Abdomen soft, appropriately tender  Incisions/Bandages clean/dry/intact  Extremities warm and well perfused    A/P:  Pain control with PO meds, minimize narcotics  Emesis noted, likely ileus, recommend NGT placement  Diet NPO  Fluids continue  Ambulate tid and ad brandee  Some bleeding with urine, monitor for now, likely secondary to instrumentation, will check for resolution.      "

## 2020-10-03 NOTE — PROGRESS NOTES
Bedside report received.  Assessment complete.  A&O x 4. Patient calls appropriately.  Patient ambulates with standby assist. Bed alarm on.   Patient has 6/10 pain. Pain managed with prescribed medications.  Pt emesis last NOC, orders received for NG.   NG placed, xray verified in stomach. Pt tolerated well.  NPO with ice chips.   Surgical MDI with staples CDI, LLQ NOEL in place to suction with SS drainage, RLQ penrose SS output.  + void light brown in color, - flatus, - BM.  Patient denies SOB.  SCD's on.  Patient pleasant with staff.   Review plan with of care with patient. Call light and personal belongings with in reach. Hourly rounding in place. All needs met at this time.

## 2020-10-04 LAB
ANION GAP SERPL CALC-SCNC: 13 MMOL/L (ref 7–16)
BUN SERPL-MCNC: 5 MG/DL (ref 8–22)
CALCIUM SERPL-MCNC: 8.8 MG/DL (ref 8.5–10.5)
CHLORIDE SERPL-SCNC: 94 MMOL/L (ref 96–112)
CO2 SERPL-SCNC: 23 MMOL/L (ref 20–33)
CREAT SERPL-MCNC: 0.32 MG/DL (ref 0.5–1.4)
ERYTHROCYTE [DISTWIDTH] IN BLOOD BY AUTOMATED COUNT: 41.5 FL (ref 35.9–50)
GLUCOSE SERPL-MCNC: 149 MG/DL (ref 65–99)
HCT VFR BLD AUTO: 37 % (ref 37–47)
HGB BLD-MCNC: 13 G/DL (ref 12–16)
MAGNESIUM SERPL-MCNC: 1.4 MG/DL (ref 1.5–2.5)
MCH RBC QN AUTO: 32.2 PG (ref 27–33)
MCHC RBC AUTO-ENTMCNC: 35.1 G/DL (ref 33.6–35)
MCV RBC AUTO: 91.6 FL (ref 81.4–97.8)
PLATELET # BLD AUTO: 291 K/UL (ref 164–446)
PMV BLD AUTO: 10.2 FL (ref 9–12.9)
POTASSIUM SERPL-SCNC: 3.1 MMOL/L (ref 3.6–5.5)
RBC # BLD AUTO: 4.04 M/UL (ref 4.2–5.4)
SODIUM SERPL-SCNC: 130 MMOL/L (ref 135–145)
WBC # BLD AUTO: 8.5 K/UL (ref 4.8–10.8)

## 2020-10-04 PROCEDURE — 700102 HCHG RX REV CODE 250 W/ 637 OVERRIDE(OP): Performed by: SURGERY

## 2020-10-04 PROCEDURE — 700101 HCHG RX REV CODE 250: Performed by: SURGERY

## 2020-10-04 PROCEDURE — 36415 COLL VENOUS BLD VENIPUNCTURE: CPT

## 2020-10-04 PROCEDURE — 770006 HCHG ROOM/CARE - MED/SURG/GYN SEMI*

## 2020-10-04 PROCEDURE — 83735 ASSAY OF MAGNESIUM: CPT

## 2020-10-04 PROCEDURE — A9270 NON-COVERED ITEM OR SERVICE: HCPCS | Performed by: SURGERY

## 2020-10-04 PROCEDURE — 80048 BASIC METABOLIC PNL TOTAL CA: CPT

## 2020-10-04 PROCEDURE — 85027 COMPLETE CBC AUTOMATED: CPT

## 2020-10-04 PROCEDURE — 700111 HCHG RX REV CODE 636 W/ 250 OVERRIDE (IP): Performed by: SURGERY

## 2020-10-04 RX ORDER — DEXTROSE MONOHYDRATE, SODIUM CHLORIDE, AND POTASSIUM CHLORIDE 50; 2.98; 9 G/1000ML; G/1000ML; G/1000ML
INJECTION, SOLUTION INTRAVENOUS CONTINUOUS
Status: DISCONTINUED | OUTPATIENT
Start: 2020-10-04 | End: 2020-10-08

## 2020-10-04 RX ORDER — POTASSIUM CHLORIDE 7.45 MG/ML
10 INJECTION INTRAVENOUS
Status: COMPLETED | OUTPATIENT
Start: 2020-10-04 | End: 2020-10-04

## 2020-10-04 RX ORDER — POTASSIUM CHLORIDE 7.45 MG/ML
10 INJECTION INTRAVENOUS
Status: DISPENSED | OUTPATIENT
Start: 2020-10-04 | End: 2020-10-04

## 2020-10-04 RX ADMIN — FAMOTIDINE 20 MG: 20 TABLET, FILM COATED ORAL at 17:58

## 2020-10-04 RX ADMIN — TRAZODONE HYDROCHLORIDE 100 MG: 50 TABLET ORAL at 22:09

## 2020-10-04 RX ADMIN — DORZOLAMIDE HYDROCHLORIDE 1 DROP: 20 SOLUTION/ DROPS OPHTHALMIC at 05:01

## 2020-10-04 RX ADMIN — DULOXETINE HYDROCHLORIDE 120 MG: 60 CAPSULE, DELAYED RELEASE ORAL at 05:01

## 2020-10-04 RX ADMIN — PILOCARPINE HYDROCHLORIDE 5 MG: 5 TABLET, FILM COATED ORAL at 12:28

## 2020-10-04 RX ADMIN — POTASSIUM CHLORIDE, DEXTROSE MONOHYDRATE AND SODIUM CHLORIDE: 150; 5; 900 INJECTION, SOLUTION INTRAVENOUS at 05:01

## 2020-10-04 RX ADMIN — ENOXAPARIN SODIUM 40 MG: 40 INJECTION SUBCUTANEOUS at 05:01

## 2020-10-04 RX ADMIN — TIMOLOL MALEATE 1 DROP: 5 SOLUTION/ DROPS OPHTHALMIC at 05:02

## 2020-10-04 RX ADMIN — DORZOLAMIDE HYDROCHLORIDE 1 DROP: 20 SOLUTION/ DROPS OPHTHALMIC at 17:58

## 2020-10-04 RX ADMIN — POTASSIUM CHLORIDE 10 MEQ: 7.46 INJECTION, SOLUTION INTRAVENOUS at 13:42

## 2020-10-04 RX ADMIN — IPRATROPIUM BROMIDE 2 SPRAY: 21 SPRAY NASAL at 17:58

## 2020-10-04 RX ADMIN — PILOCARPINE HYDROCHLORIDE 5 MG: 5 TABLET, FILM COATED ORAL at 05:01

## 2020-10-04 RX ADMIN — POTASSIUM CHLORIDE, DEXTROSE MONOHYDRATE AND SODIUM CHLORIDE: 150; 5; 900 INJECTION, SOLUTION INTRAVENOUS at 12:00

## 2020-10-04 RX ADMIN — LATANOPROST 1 DROP: 50 SOLUTION OPHTHALMIC at 22:09

## 2020-10-04 RX ADMIN — IPRATROPIUM BROMIDE 2 SPRAY: 21 SPRAY NASAL at 05:01

## 2020-10-04 RX ADMIN — TIMOLOL MALEATE 1 DROP: 5 SOLUTION/ DROPS OPHTHALMIC at 17:58

## 2020-10-04 RX ADMIN — KETOROLAC TROMETHAMINE 15 MG: 30 INJECTION, SOLUTION INTRAMUSCULAR; INTRAVENOUS at 22:09

## 2020-10-04 RX ADMIN — KETOROLAC TROMETHAMINE 15 MG: 30 INJECTION, SOLUTION INTRAMUSCULAR; INTRAVENOUS at 12:28

## 2020-10-04 RX ADMIN — POTASSIUM CHLORIDE 10 MEQ: 7.46 INJECTION, SOLUTION INTRAVENOUS at 12:28

## 2020-10-04 RX ADMIN — POTASSIUM CHLORIDE, DEXTROSE MONOHYDRATE AND SODIUM CHLORIDE: 300; 5; 900 INJECTION, SOLUTION INTRAVENOUS at 22:24

## 2020-10-04 RX ADMIN — FAMOTIDINE 20 MG: 20 TABLET, FILM COATED ORAL at 05:01

## 2020-10-04 RX ADMIN — DORZOLAMIDE HYDROCHLORIDE 1 DROP: 20 SOLUTION/ DROPS OPHTHALMIC at 12:28

## 2020-10-04 RX ADMIN — PILOCARPINE HYDROCHLORIDE 5 MG: 5 TABLET, FILM COATED ORAL at 17:57

## 2020-10-04 ASSESSMENT — PAIN DESCRIPTION - PAIN TYPE: TYPE: SURGICAL PAIN

## 2020-10-04 NOTE — PROGRESS NOTES
Report received. Assessment complete.  AAOx4. Patient calls appropriately.  Pt reports pain 8/10. Cold pack applied, declining alternate interventions. No further interventions needed at this time. Will continue to monitor.   Pt denies N/V, SOB, or chest pain.  BAUTISTA, ambulatory x SBA.  + void, hypoactive bowel sounds, LBM 10/02  Pt is tolerating NPO diet.   RLQ penrose drain in place, covered with gauze, CDI  LLQ NOEL drain in place, draining serosanguineous fluid  MLI PARIS with staples, site is reddened, but no drainage noted  Abdomen is distended, rounded, semi-firm and tender.   R nare NG tube in place, low continuous suction.     Plan of care discussed with patient. Fall precautions in place. Belongings and call light within reach. Educated patient to call for assistance as needed. All needs met at this time.

## 2020-10-04 NOTE — PROGRESS NOTES
"10/4  S:  70 y.o.female s/p Open Katz's Reversal  POD# 5.  Patient with slow recovery, bilious NGT output, no flatus or BM.  WBC improved overnight, pain controlled, able to ambulate with assistance.      O:  /101   Pulse 80   Temp 36.8 °C (98.2 °F) (Temporal)   Resp 16   Ht 1.626 m (5' 4\")   Wt 55 kg (121 lb 4.1 oz)   SpO2 92%   I/O last 3 completed shifts:  In: 2021 [P.O.:321; I.V.:1700]  Out: 4061 [Urine:3450; Emesis:1; Drains:110]  Recent Labs     10/02/20  0654 10/03/20  0539 10/04/20  0553   SODIUM 133* 130* 130*   POTASSIUM 4.0 3.2* 3.1*   CHLORIDE 100 94* 94*   CO2 22 24 23   GLUCOSE 149* 162* 149*   BUN 5* 3* 5*   CREATININE 0.46* 0.36* 0.32*   CALCIUM 9.1 8.7 8.8     Recent Labs     10/02/20  0654 10/03/20  0539 10/04/20  0553   WBC 9.1 13.1* 8.5   RBC 3.77* 3.73* 4.04*   HEMOGLOBIN 12.3 12.1 13.0   HEMATOCRIT 35.8* 34.4* 37.0   MCV 95.0 92.2 91.6   MCH 32.6 32.4 32.2   MCHC 34.4 35.2* 35.1*   RDW 43.7 42.5 41.5   PLATELETCT 237 261 291   MPV 10.1 9.8 10.2       Alert and Oriented x3, No Acute Distress  Normal Respiratory Effort  Abdomen soft, appropriately tender  Incisions/Bandages clean/dry/intact  Extremities warm and well perfused  NOEL Output Serosanguinous-90cc    A/P:  Pain control with non-narcotic meds as able  Diet NPO, ice chips only  NGT to remain to suction  Fluids continue  Replace K IV  Ambulate tid and ad brandee  Await resolution of ileus    "

## 2020-10-05 LAB
ANION GAP SERPL CALC-SCNC: 13 MMOL/L (ref 7–16)
BUN SERPL-MCNC: 12 MG/DL (ref 8–22)
CALCIUM SERPL-MCNC: 9.1 MG/DL (ref 8.5–10.5)
CHLORIDE SERPL-SCNC: 96 MMOL/L (ref 96–112)
CO2 SERPL-SCNC: 25 MMOL/L (ref 20–33)
CREAT SERPL-MCNC: 0.48 MG/DL (ref 0.5–1.4)
CRP SERPL HS-MCNC: 2.32 MG/DL (ref 0–0.75)
ERYTHROCYTE [DISTWIDTH] IN BLOOD BY AUTOMATED COUNT: 43.3 FL (ref 35.9–50)
GLUCOSE SERPL-MCNC: 140 MG/DL (ref 65–99)
HCT VFR BLD AUTO: 36.6 % (ref 37–47)
HGB BLD-MCNC: 12.8 G/DL (ref 12–16)
MCH RBC QN AUTO: 32.3 PG (ref 27–33)
MCHC RBC AUTO-ENTMCNC: 35 G/DL (ref 33.6–35)
MCV RBC AUTO: 92.4 FL (ref 81.4–97.8)
PLATELET # BLD AUTO: 355 K/UL (ref 164–446)
PMV BLD AUTO: 9.4 FL (ref 9–12.9)
POTASSIUM SERPL-SCNC: 3.2 MMOL/L (ref 3.6–5.5)
RBC # BLD AUTO: 3.96 M/UL (ref 4.2–5.4)
SODIUM SERPL-SCNC: 134 MMOL/L (ref 135–145)
WBC # BLD AUTO: 10.2 K/UL (ref 4.8–10.8)

## 2020-10-05 PROCEDURE — 700102 HCHG RX REV CODE 250 W/ 637 OVERRIDE(OP): Performed by: SURGERY

## 2020-10-05 PROCEDURE — 86140 C-REACTIVE PROTEIN: CPT

## 2020-10-05 PROCEDURE — 770006 HCHG ROOM/CARE - MED/SURG/GYN SEMI*

## 2020-10-05 PROCEDURE — 85027 COMPLETE CBC AUTOMATED: CPT

## 2020-10-05 PROCEDURE — 700101 HCHG RX REV CODE 250: Performed by: SURGERY

## 2020-10-05 PROCEDURE — 80048 BASIC METABOLIC PNL TOTAL CA: CPT

## 2020-10-05 PROCEDURE — 700111 HCHG RX REV CODE 636 W/ 250 OVERRIDE (IP): Performed by: SURGERY

## 2020-10-05 PROCEDURE — A9270 NON-COVERED ITEM OR SERVICE: HCPCS | Performed by: SURGERY

## 2020-10-05 PROCEDURE — 36415 COLL VENOUS BLD VENIPUNCTURE: CPT

## 2020-10-05 RX ORDER — POTASSIUM CHLORIDE 7.45 MG/ML
10 INJECTION INTRAVENOUS
Status: COMPLETED | OUTPATIENT
Start: 2020-10-05 | End: 2020-10-05

## 2020-10-05 RX ORDER — TRAMADOL HYDROCHLORIDE 50 MG/1
50 TABLET ORAL EVERY 6 HOURS PRN
Status: DISCONTINUED | OUTPATIENT
Start: 2020-10-05 | End: 2020-10-08

## 2020-10-05 RX ADMIN — KETOROLAC TROMETHAMINE 15 MG: 30 INJECTION, SOLUTION INTRAMUSCULAR; INTRAVENOUS at 10:14

## 2020-10-05 RX ADMIN — DULOXETINE HYDROCHLORIDE 120 MG: 60 CAPSULE, DELAYED RELEASE ORAL at 05:19

## 2020-10-05 RX ADMIN — LATANOPROST 1 DROP: 50 SOLUTION OPHTHALMIC at 22:16

## 2020-10-05 RX ADMIN — DORZOLAMIDE HYDROCHLORIDE 1 DROP: 20 SOLUTION/ DROPS OPHTHALMIC at 18:48

## 2020-10-05 RX ADMIN — PILOCARPINE HYDROCHLORIDE 5 MG: 5 TABLET, FILM COATED ORAL at 05:22

## 2020-10-05 RX ADMIN — POTASSIUM CHLORIDE 10 MEQ: 7.46 INJECTION, SOLUTION INTRAVENOUS at 08:40

## 2020-10-05 RX ADMIN — DORZOLAMIDE HYDROCHLORIDE 1 DROP: 20 SOLUTION/ DROPS OPHTHALMIC at 13:48

## 2020-10-05 RX ADMIN — KETOROLAC TROMETHAMINE 15 MG: 30 INJECTION, SOLUTION INTRAMUSCULAR; INTRAVENOUS at 23:26

## 2020-10-05 RX ADMIN — TRAMADOL HYDROCHLORIDE 50 MG: 50 TABLET ORAL at 19:46

## 2020-10-05 RX ADMIN — FAMOTIDINE 20 MG: 20 TABLET, FILM COATED ORAL at 18:47

## 2020-10-05 RX ADMIN — POTASSIUM CHLORIDE, DEXTROSE MONOHYDRATE AND SODIUM CHLORIDE: 300; 5; 900 INJECTION, SOLUTION INTRAVENOUS at 22:16

## 2020-10-05 RX ADMIN — KETOROLAC TROMETHAMINE 15 MG: 30 INJECTION, SOLUTION INTRAMUSCULAR; INTRAVENOUS at 04:21

## 2020-10-05 RX ADMIN — IPRATROPIUM BROMIDE 2 SPRAY: 21 SPRAY NASAL at 05:18

## 2020-10-05 RX ADMIN — TIMOLOL MALEATE 1 DROP: 5 SOLUTION/ DROPS OPHTHALMIC at 18:48

## 2020-10-05 RX ADMIN — PILOCARPINE HYDROCHLORIDE 5 MG: 5 TABLET, FILM COATED ORAL at 18:48

## 2020-10-05 RX ADMIN — POTASSIUM CHLORIDE 10 MEQ: 7.46 INJECTION, SOLUTION INTRAVENOUS at 09:53

## 2020-10-05 RX ADMIN — POTASSIUM CHLORIDE 10 MEQ: 7.46 INJECTION, SOLUTION INTRAVENOUS at 11:05

## 2020-10-05 RX ADMIN — DORZOLAMIDE HYDROCHLORIDE 1 DROP: 20 SOLUTION/ DROPS OPHTHALMIC at 05:18

## 2020-10-05 RX ADMIN — ONDANSETRON 4 MG: 2 INJECTION INTRAMUSCULAR; INTRAVENOUS at 10:14

## 2020-10-05 RX ADMIN — PILOCARPINE HYDROCHLORIDE 5 MG: 5 TABLET, FILM COATED ORAL at 13:48

## 2020-10-05 RX ADMIN — IPRATROPIUM BROMIDE 2 SPRAY: 21 SPRAY NASAL at 18:48

## 2020-10-05 RX ADMIN — TIMOLOL MALEATE 1 DROP: 5 SOLUTION/ DROPS OPHTHALMIC at 05:18

## 2020-10-05 RX ADMIN — TRAMADOL HYDROCHLORIDE 50 MG: 50 TABLET ORAL at 18:50

## 2020-10-05 RX ADMIN — ONDANSETRON 4 MG: 2 INJECTION INTRAMUSCULAR; INTRAVENOUS at 22:12

## 2020-10-05 RX ADMIN — KETOROLAC TROMETHAMINE 15 MG: 30 INJECTION, SOLUTION INTRAMUSCULAR; INTRAVENOUS at 17:10

## 2020-10-05 RX ADMIN — FAMOTIDINE 20 MG: 20 TABLET, FILM COATED ORAL at 05:21

## 2020-10-05 RX ADMIN — TRAZODONE HYDROCHLORIDE 100 MG: 50 TABLET ORAL at 22:12

## 2020-10-05 RX ADMIN — ENOXAPARIN SODIUM 40 MG: 40 INJECTION SUBCUTANEOUS at 05:27

## 2020-10-05 RX ADMIN — ONDANSETRON 4 MG: 2 INJECTION INTRAMUSCULAR; INTRAVENOUS at 02:57

## 2020-10-05 ASSESSMENT — PAIN DESCRIPTION - PAIN TYPE
TYPE: ACUTE PAIN
TYPE: SURGICAL PAIN
TYPE: SURGICAL PAIN

## 2020-10-05 NOTE — PROGRESS NOTES
5220-6367: Aox4. VSS on RA. C/o mild back pain, medicated per MAR with good effect. Continent b/b, up to the toilet w/ steady gait and SBA.  No flatus, no BM this shift. Skin per flowsheet. NOEL drain dressing changed x2, small serosang drainage to drsg.. Safety maintained, bed alarm set. WCTM.

## 2020-10-05 NOTE — DISCHARGE PLANNING
Care Transition Team Discharge Planning                   Discharge Plan:  [facility]    This RN CM is following the case. Pt is on NPO, on NGT to remain to suction and with NOEL to  Serosanguinous.    Will continue to assist with discharge as needed.

## 2020-10-05 NOTE — DIETARY
Nutrition Services: Brief Update   Nutrition services: Day 6 of admit.  Tamiko Cabrera is a 70 y.o. female with admitting DX of PARASTOMAL HERNIA, COLOSTOMY IN PLACE, Para-ileostomy hernia.     Pt noted to be NPO/clear liquid x6 days.     Spoke with RN regarding nutritional plan of care. Per RN appears to be no plan for advancing PO diet or starting TF, as NGT currently removing ~1 L fluids per day.     RD paged MD to discuss consideration of TPN. However, no response at this time.     RD will follow up tomorrow regarding nutritional plan of care.

## 2020-10-05 NOTE — CARE PLAN
Problem: Bowel/Gastric:  Goal: Normal bowel function is maintained or improved  Outcome: PROGRESSING SLOWER THAN EXPECTED  Intervention: Educate patient and significant other/support system about diet, fluid intake, medications and activity to promote bowel function  Note: + flatus, pt reported small smear     Problem: Knowledge Deficit  Goal: Knowledge of disease process/condition, treatment plan, diagnostic tests, and medications will improve  Outcome: PROGRESSING AS EXPECTED  Intervention: Explain information regarding disease process/condition, treatment plan, diagnostic tests, and medications and document in education  Note: Pt updated on POC, questions answered

## 2020-10-05 NOTE — CARE PLAN
Problem: Bowel/Gastric:  Goal: Normal bowel function is maintained or improved  Outcome: NOT MET     Problem: Knowledge Deficit  Goal: Knowledge of disease process/condition, treatment plan, diagnostic tests, and medications will improve  Outcome: PROGRESSING AS EXPECTED  Intervention: Explain information regarding disease process/condition, treatment plan, diagnostic tests, and medications and document in education  Note: Pt updated on POC, questions answered

## 2020-10-05 NOTE — PROGRESS NOTES
"10/5  S:  70 y.o.female s/p Open Katz's Reversal  POD# 6.  Patient doing some better, had a little flatus pass yesterday, still with bilious NGT output.  Ambulating some but still with distension.      O:  /97   Pulse 85   Temp 37.3 °C (99.2 °F) (Temporal)   Resp 18   Ht 1.626 m (5' 4\")   Wt 55 kg (121 lb 4.1 oz)   SpO2 100%   I/O last 3 completed shifts:  In: 900 [I.V.:900]  Out: 3420 [Urine:1800; Drains:170]  Recent Labs     10/03/20  0539 10/04/20  0553 10/05/20  0532   SODIUM 130* 130* 134*   POTASSIUM 3.2* 3.1* 3.2*   CHLORIDE 94* 94* 96   CO2 24 23 25   GLUCOSE 162* 149* 140*   BUN 3* 5* 12   CREATININE 0.36* 0.32* 0.48*   CALCIUM 8.7 8.8 9.1     Recent Labs     10/03/20  0539 10/04/20  0553 10/05/20  0532   WBC 13.1* 8.5 10.2   RBC 3.73* 4.04* 3.96*   HEMOGLOBIN 12.1 13.0 12.8   HEMATOCRIT 34.4* 37.0 36.6*   MCV 92.2 91.6 92.4   MCH 32.4 32.2 32.3   MCHC 35.2* 35.1* 35.0   RDW 42.5 41.5 43.3   PLATELETCT 261 291 355   MPV 9.8 10.2 9.4   CRP 3.75-> 4.17 -> 3.32    Alert and Oriented x3, No Acute Distress  Normal Respiratory Effort  Abdomen soft, appropriately tender  Incisions/Bandages clean/dry/intact  Extremities warm and well perfused  NOEL Output Serosanguinous- 125cc  NGT bilious- 950cc    A/P:  Pain control with non-narcotics meds  Diet NPO, NGT to remain to suction  Fluids continue, replace K  Ambulate tid and ad brandee  Await resolution of ileus    "

## 2020-10-05 NOTE — PROGRESS NOTES
[id number]: Aox4. VSS on RA. C/o severe lower back pain that pt reports is new since being in the hospital. This RN suspects it is d/t increased time in in bed. Pt is encouraged to ambulate frequently and sit up in the chair. Pt reports positive effect w/ increased movement and activity. Pt medicated per MAR with good effect. MD aware, tramadol given. Pt remains NPO w/ ice chips. NGT Continent b/b, up to the toilet w/ SBA and steady gait. No BM this shift. Pt reported very small smear. Skin per flowsheet. Safety maintained, bed alarm set. WCTM.

## 2020-10-06 LAB
ANION GAP SERPL CALC-SCNC: 15 MMOL/L (ref 7–16)
BUN SERPL-MCNC: 17 MG/DL (ref 8–22)
CALCIUM SERPL-MCNC: 9.4 MG/DL (ref 8.5–10.5)
CHLORIDE SERPL-SCNC: 100 MMOL/L (ref 96–112)
CO2 SERPL-SCNC: 24 MMOL/L (ref 20–33)
CREAT SERPL-MCNC: 0.44 MG/DL (ref 0.5–1.4)
CRP SERPL HS-MCNC: 1.81 MG/DL (ref 0–0.75)
ERYTHROCYTE [DISTWIDTH] IN BLOOD BY AUTOMATED COUNT: 46 FL (ref 35.9–50)
GLUCOSE SERPL-MCNC: 108 MG/DL (ref 65–99)
HCT VFR BLD AUTO: 40 % (ref 37–47)
HGB BLD-MCNC: 13.5 G/DL (ref 12–16)
MAGNESIUM SERPL-MCNC: 1.8 MG/DL (ref 1.5–2.5)
MCH RBC QN AUTO: 32.5 PG (ref 27–33)
MCHC RBC AUTO-ENTMCNC: 33.8 G/DL (ref 33.6–35)
MCV RBC AUTO: 96.4 FL (ref 81.4–97.8)
PLATELET # BLD AUTO: 402 K/UL (ref 164–446)
PMV BLD AUTO: 9.7 FL (ref 9–12.9)
POTASSIUM SERPL-SCNC: 3.5 MMOL/L (ref 3.6–5.5)
RBC # BLD AUTO: 4.15 M/UL (ref 4.2–5.4)
SODIUM SERPL-SCNC: 139 MMOL/L (ref 135–145)
WBC # BLD AUTO: 11.3 K/UL (ref 4.8–10.8)

## 2020-10-06 PROCEDURE — 83735 ASSAY OF MAGNESIUM: CPT

## 2020-10-06 PROCEDURE — 80048 BASIC METABOLIC PNL TOTAL CA: CPT

## 2020-10-06 PROCEDURE — 700101 HCHG RX REV CODE 250: Performed by: SURGERY

## 2020-10-06 PROCEDURE — 36415 COLL VENOUS BLD VENIPUNCTURE: CPT

## 2020-10-06 PROCEDURE — 700111 HCHG RX REV CODE 636 W/ 250 OVERRIDE (IP): Performed by: SURGERY

## 2020-10-06 PROCEDURE — A9270 NON-COVERED ITEM OR SERVICE: HCPCS | Performed by: SURGERY

## 2020-10-06 PROCEDURE — 86140 C-REACTIVE PROTEIN: CPT

## 2020-10-06 PROCEDURE — 770006 HCHG ROOM/CARE - MED/SURG/GYN SEMI*

## 2020-10-06 PROCEDURE — 85027 COMPLETE CBC AUTOMATED: CPT

## 2020-10-06 PROCEDURE — 700102 HCHG RX REV CODE 250 W/ 637 OVERRIDE(OP): Performed by: SURGERY

## 2020-10-06 RX ORDER — TRAZODONE HYDROCHLORIDE 150 MG/1
150 TABLET ORAL
Status: DISCONTINUED | OUTPATIENT
Start: 2020-10-06 | End: 2020-10-16 | Stop reason: HOSPADM

## 2020-10-06 RX ADMIN — IPRATROPIUM BROMIDE 2 SPRAY: 21 SPRAY NASAL at 05:58

## 2020-10-06 RX ADMIN — DULOXETINE HYDROCHLORIDE 120 MG: 60 CAPSULE, DELAYED RELEASE ORAL at 06:12

## 2020-10-06 RX ADMIN — DORZOLAMIDE HYDROCHLORIDE 1 DROP: 20 SOLUTION/ DROPS OPHTHALMIC at 18:18

## 2020-10-06 RX ADMIN — TIMOLOL MALEATE 1 DROP: 5 SOLUTION/ DROPS OPHTHALMIC at 18:18

## 2020-10-06 RX ADMIN — TRAZODONE HYDROCHLORIDE 150 MG: 100 TABLET ORAL at 20:31

## 2020-10-06 RX ADMIN — IPRATROPIUM BROMIDE 2 SPRAY: 21 SPRAY NASAL at 18:18

## 2020-10-06 RX ADMIN — FAMOTIDINE 20 MG: 20 TABLET, FILM COATED ORAL at 05:57

## 2020-10-06 RX ADMIN — TIMOLOL MALEATE 1 DROP: 5 SOLUTION/ DROPS OPHTHALMIC at 06:01

## 2020-10-06 RX ADMIN — FAMOTIDINE 20 MG: 20 TABLET, FILM COATED ORAL at 18:18

## 2020-10-06 RX ADMIN — ENOXAPARIN SODIUM 40 MG: 40 INJECTION SUBCUTANEOUS at 05:57

## 2020-10-06 RX ADMIN — DORZOLAMIDE HYDROCHLORIDE 1 DROP: 20 SOLUTION/ DROPS OPHTHALMIC at 12:40

## 2020-10-06 RX ADMIN — KETOROLAC TROMETHAMINE 15 MG: 30 INJECTION, SOLUTION INTRAMUSCULAR; INTRAVENOUS at 05:56

## 2020-10-06 RX ADMIN — PILOCARPINE HYDROCHLORIDE 5 MG: 5 TABLET, FILM COATED ORAL at 18:18

## 2020-10-06 RX ADMIN — TRAMADOL HYDROCHLORIDE 50 MG: 50 TABLET ORAL at 16:27

## 2020-10-06 RX ADMIN — ONDANSETRON 4 MG: 2 INJECTION INTRAMUSCULAR; INTRAVENOUS at 05:56

## 2020-10-06 RX ADMIN — PILOCARPINE HYDROCHLORIDE 5 MG: 5 TABLET, FILM COATED ORAL at 12:40

## 2020-10-06 RX ADMIN — KETOROLAC TROMETHAMINE 15 MG: 30 INJECTION, SOLUTION INTRAMUSCULAR; INTRAVENOUS at 18:52

## 2020-10-06 RX ADMIN — TRAMADOL HYDROCHLORIDE 50 MG: 50 TABLET ORAL at 09:32

## 2020-10-06 RX ADMIN — PILOCARPINE HYDROCHLORIDE 5 MG: 5 TABLET, FILM COATED ORAL at 05:57

## 2020-10-06 RX ADMIN — LATANOPROST 1 DROP: 50 SOLUTION OPHTHALMIC at 20:32

## 2020-10-06 RX ADMIN — DORZOLAMIDE HYDROCHLORIDE 1 DROP: 20 SOLUTION/ DROPS OPHTHALMIC at 05:58

## 2020-10-06 RX ADMIN — KETOROLAC TROMETHAMINE 15 MG: 30 INJECTION, SOLUTION INTRAMUSCULAR; INTRAVENOUS at 12:39

## 2020-10-06 RX ADMIN — POTASSIUM CHLORIDE, DEXTROSE MONOHYDRATE AND SODIUM CHLORIDE 1 ML: 300; 5; 900 INJECTION, SOLUTION INTRAVENOUS at 20:31

## 2020-10-06 ASSESSMENT — PAIN DESCRIPTION - PAIN TYPE
TYPE: ACUTE PAIN

## 2020-10-06 NOTE — DISCHARGE PLANNING
Care Transition Team Assessment    This RN CM met with Pt at bedside for this assessment. Pt veriified accuracy of the Face Sheet Demographics. Pt lives with her Sister, Alba in a one story house in [place]. Pt uses oxygen at night and has a walker at home. Pt's Sister, Alba is her main support and she drives Pt to her appointments. Per Pt , her Sister can provide transport to home once she is medically clear.       Information Source  Orientation : Oriented x 4  Information Given By: Patient  Who is responsible for making decisions for patient? : Patient    Readmission Evaluation  Is this a readmission?: No    Elopement Risk  Legal Hold: No  Ambulatory or Self Mobile in Wheelchair: Yes  Disoriented: No  Psychiatric Symptoms: None  History of Wandering: No  Elopement this Admit: No  Vocalizing Wanting to Leave: No  Displays Behaviors, Body Language Wanting to Leave: No-Not at Risk for Elopement  Elopement Risk: Not at Risk for Elopement  Wanderguard On: Unavailable  Personal Belongings: Hospital Clothing Only    Interdisciplinary Discharge Planning  Primary Care Physician: Dr Hayden  Lives with - Patient's Self Care Capacity: Other (Comments)  Patient or legal guardian wants to designate a caregiver: No  Support Systems: Family Member(s)  Housing / Facility: 1 Story House  Do You Take your Prescribed Medications Regularly: Yes  Able to Return to Previous ADL's: Yes  Mobility Issues: No  Prior Services: None  Patient Prefers to be Discharged to:(Home with help)  Assistance Needed: Unknown at this Time  Durable Medical Equipment: Home Oxygen at night    Discharge Preparedness  What is your plan after discharge?: Home with help  What are your discharge supports?: Sibling  Prior Functional Level: Ambulatory, Independent with Activities of Daily Living, Independent with Medication Management  Difficulity with ADLs: None  Difficulity with IADLs: Driving    Functional Assesment  Prior Functional Level: Ambulatory,  Independent with Activities of Daily Living, Independent with Medication Management    Finances  Financial Barriers to Discharge: No  Prescription Coverage: Yes    Vision / Hearing Impairment  Vision Impairment : Yes  Right Eye Vision: Impaired, Wears Glasses  Left Eye Vision: Impaired, Wears Glasses  Hearing Impairment : Yes  Hearing Impairment: Both Ears  Does Pt Need Special Equipment for the Hearing Impaired?: No         Advance Directive  Advance Directive?: None    Domestic Abuse  Have you ever been the victim of abuse or violence?: No  Physical Abuse or Sexual Abuse: No  Verbal Abuse or Emotional Abuse: No  Possible Abuse/Neglect Reported to: Not Applicable         Discharge Risks or Barriers  Discharge risks or barriers?: Other (Pending medical clearance)    Anticipated Discharge Information  Discharge Disposition: Discharged to home/self care (01)  Discharge Address: [facility] [place] [address]  Discharge Contact Phone Number: [phone]

## 2020-10-06 NOTE — DIETARY
"Nutrition Services: Update  Pt NPO/clear liquid diet x7 days.     Per surgery note this morning \"starting to pass flatus and some minimal stool.  She feels like her appetite is returning as well.\" Additionally notes: \"will try clamping her NGT today and see if this is ready to be removed\"    Spoke with RN. RN reports that pt's NGT is currently clamped and she will be checking for residuals this afternoon. Also states pt is currently receiving sips of clears. Reports that if NG output is acceptable plan is to start pt on clear liquid diet order.     RD continues to follow.       "

## 2020-10-06 NOTE — PROGRESS NOTES
Report received. Assessment completed.  Pt is A&O x4. Pt on room air.   Medicating for pain PRN per MAR   Denies N/V at this time  R. Penrose drain with dressing in place- old drainage noted  L. NOEL in place with scant SS drainage noted  +small smear BM   +void.  NPO  NG to LCS green brown output   Pt up with SBA.  Call light and belongings within reach. All needs met at this time. Fall Precautions and hourly rounding in place.

## 2020-10-06 NOTE — PROGRESS NOTES
"10/6  S:  70 y.o.female s/p Open Hartmans Reversal  POD# 7.  Patient doing some better, starting to pass flatus and some minimal stool.  She feels like her appetite is returning as well.  She is ambulating with assistance from nursing and wants to try and take a shower today.      O:  /86   Pulse 77   Temp 37.2 °C (99 °F) (Temporal)   Resp 15   Ht 1.626 m (5' 4\")   Wt 55 kg (121 lb 4.1 oz)   SpO2 94%   I/O last 3 completed shifts:  In: 400 [I.V.:400]  Out: 1364 [Urine:200; Drains:14]  Recent Labs     10/04/20  0553 10/05/20  0532 10/06/20  0801   SODIUM 130* 134* 139   POTASSIUM 3.1* 3.2* 3.5*   CHLORIDE 94* 96 100   CO2 23 25 24   GLUCOSE 149* 140* 108*   BUN 5* 12 17   CREATININE 0.32* 0.48* 0.44*   CALCIUM 8.8 9.1 9.4     Recent Labs     10/04/20  0553 10/05/20  0532 10/06/20  0801   WBC 8.5 10.2 11.3*   RBC 4.04* 3.96* 4.15*   HEMOGLOBIN 13.0 12.8 13.5   HEMATOCRIT 37.0 36.6* 40.0   MCV 91.6 92.4 96.4   MCH 32.2 32.3 32.5   MCHC 35.1* 35.0 33.8   RDW 41.5 43.3 46.0   PLATELETCT 291 355 402   MPV 10.2 9.4 9.7       Alert and Oriented x3, No Acute Distress  Normal Respiratory Effort  Abdomen soft, appropriately tender  Incisions/Bandages clean/dry/intact  Extremities warm and well perfused  NOEL Output Serosanguinous-9cc only  NGT Bilious 700cc    A/P:  Pain control with PO meds, she is having a lot of back pain but I again explained to her the importance of minimizing narcotics in this setting to try and get her ileus to resolve  Diet NPO, will try clamping her NGT today and see if this is ready to be removed  Fluids continue IVF, K improved  Ambulate tid and ad brandee  CRP decreasing, low suspicion for infection    "

## 2020-10-06 NOTE — PROGRESS NOTES
MD placed order to Clamp NGT, check residuals in 6 hours and remove if less then 100cc, otherwise return to suction..   After 6 hours pt denies n/v.  Only minimal residual returned.  NG removed.  NOEL and penrose drains also removed and dressings placed.   Pt tolerated and understands importance of only taking sips of clears for now

## 2020-10-06 NOTE — CARE PLAN
Problem: Nutritional:  Goal: Achieve adequate nutritional intake  Description: Diet will advance >NPO/clear liquid diet. Patient will consume >50% of meals.   Outcome: PROGRESSING SLOWER THAN EXPECTED   See RD note.

## 2020-10-06 NOTE — PROGRESS NOTES
AA&Ox4.    SpO2 94% on RA. Denies SOB.    Reporting 8/10 pain. Medicated per MAR.    SKIN Midline incision approximated with staples; CDI. Old ostomy site with penrose drain and dressing in place, old drainage noted. NOEL site with dressing in place; CDI. No other areas of concern noted at this time.     Drains LLQ NOEL drain compressed to self suction. Draining SS output.     Tolerating NOP diet. Medicated per MAR for N/V.    + void.    + BM / LBM 10/02/2020 per patient. 10/05/2020 Smear and + flatus.      Pt ambulates/up with Stand by assist.    All needs met at this time. Call light within reach. Pt calls appropriately.   Bladder scan results 345 ml.

## 2020-10-06 NOTE — CARE PLAN
Problem: Safety  Goal: Will remain free from falls  Outcome: PROGRESSING AS EXPECTED  Pt demonstrates appropriate use of call light to alert staff to needs.        Problem: Knowledge Deficit  Goal: Knowledge of disease process/condition, treatment plan, diagnostic tests, and medications will improve  Outcome: PROGRESSING AS EXPECTED   Pt was educated on POC, MAR, shift routine and nursing education R/T Dx. Questions were encouraged and answered. Pt verbalized understanding of all teaching.

## 2020-10-07 LAB
ANION GAP SERPL CALC-SCNC: 11 MMOL/L (ref 7–16)
BUN SERPL-MCNC: 19 MG/DL (ref 8–22)
CALCIUM SERPL-MCNC: 8.8 MG/DL (ref 8.5–10.5)
CHLORIDE SERPL-SCNC: 102 MMOL/L (ref 96–112)
CO2 SERPL-SCNC: 25 MMOL/L (ref 20–33)
CREAT SERPL-MCNC: 0.57 MG/DL (ref 0.5–1.4)
CRP SERPL HS-MCNC: 1.48 MG/DL (ref 0–0.75)
ERYTHROCYTE [DISTWIDTH] IN BLOOD BY AUTOMATED COUNT: 45.9 FL (ref 35.9–50)
GLUCOSE SERPL-MCNC: 120 MG/DL (ref 65–99)
HCT VFR BLD AUTO: 34.5 % (ref 37–47)
HGB BLD-MCNC: 11.5 G/DL (ref 12–16)
MCH RBC QN AUTO: 31.9 PG (ref 27–33)
MCHC RBC AUTO-ENTMCNC: 33.3 G/DL (ref 33.6–35)
MCV RBC AUTO: 95.8 FL (ref 81.4–97.8)
PLATELET # BLD AUTO: 367 K/UL (ref 164–446)
PMV BLD AUTO: 9.1 FL (ref 9–12.9)
POTASSIUM SERPL-SCNC: 3.4 MMOL/L (ref 3.6–5.5)
RBC # BLD AUTO: 3.6 M/UL (ref 4.2–5.4)
SODIUM SERPL-SCNC: 138 MMOL/L (ref 135–145)
WBC # BLD AUTO: 8.3 K/UL (ref 4.8–10.8)

## 2020-10-07 PROCEDURE — 80048 BASIC METABOLIC PNL TOTAL CA: CPT

## 2020-10-07 PROCEDURE — 86140 C-REACTIVE PROTEIN: CPT

## 2020-10-07 PROCEDURE — 700111 HCHG RX REV CODE 636 W/ 250 OVERRIDE (IP): Performed by: SURGERY

## 2020-10-07 PROCEDURE — 700101 HCHG RX REV CODE 250: Performed by: SURGERY

## 2020-10-07 PROCEDURE — 36415 COLL VENOUS BLD VENIPUNCTURE: CPT

## 2020-10-07 PROCEDURE — A9270 NON-COVERED ITEM OR SERVICE: HCPCS | Performed by: SURGERY

## 2020-10-07 PROCEDURE — 85027 COMPLETE CBC AUTOMATED: CPT

## 2020-10-07 PROCEDURE — 770006 HCHG ROOM/CARE - MED/SURG/GYN SEMI*

## 2020-10-07 PROCEDURE — 700102 HCHG RX REV CODE 250 W/ 637 OVERRIDE(OP): Performed by: SURGERY

## 2020-10-07 PROCEDURE — 51798 US URINE CAPACITY MEASURE: CPT

## 2020-10-07 RX ORDER — CYCLOBENZAPRINE HCL 10 MG
10 TABLET ORAL 3 TIMES DAILY PRN
Status: DISCONTINUED | OUTPATIENT
Start: 2020-10-07 | End: 2020-10-08

## 2020-10-07 RX ADMIN — POTASSIUM CHLORIDE, DEXTROSE MONOHYDRATE AND SODIUM CHLORIDE: 300; 5; 900 INJECTION, SOLUTION INTRAVENOUS at 21:00

## 2020-10-07 RX ADMIN — DORZOLAMIDE HYDROCHLORIDE 1 DROP: 20 SOLUTION/ DROPS OPHTHALMIC at 17:02

## 2020-10-07 RX ADMIN — TRAMADOL HYDROCHLORIDE 50 MG: 50 TABLET ORAL at 14:57

## 2020-10-07 RX ADMIN — DORZOLAMIDE HYDROCHLORIDE 1 DROP: 20 SOLUTION/ DROPS OPHTHALMIC at 06:40

## 2020-10-07 RX ADMIN — DORZOLAMIDE HYDROCHLORIDE 1 DROP: 20 SOLUTION/ DROPS OPHTHALMIC at 11:30

## 2020-10-07 RX ADMIN — TRAMADOL HYDROCHLORIDE 50 MG: 50 TABLET ORAL at 00:11

## 2020-10-07 RX ADMIN — LATANOPROST 1 DROP: 50 SOLUTION OPHTHALMIC at 21:00

## 2020-10-07 RX ADMIN — ENOXAPARIN SODIUM 40 MG: 40 INJECTION SUBCUTANEOUS at 06:40

## 2020-10-07 RX ADMIN — TIMOLOL MALEATE 1 DROP: 5 SOLUTION/ DROPS OPHTHALMIC at 06:40

## 2020-10-07 RX ADMIN — PILOCARPINE HYDROCHLORIDE 5 MG: 5 TABLET, FILM COATED ORAL at 17:02

## 2020-10-07 RX ADMIN — TIMOLOL MALEATE 1 DROP: 5 SOLUTION/ DROPS OPHTHALMIC at 17:02

## 2020-10-07 RX ADMIN — IPRATROPIUM BROMIDE 2 SPRAY: 21 SPRAY NASAL at 06:40

## 2020-10-07 RX ADMIN — CYCLOBENZAPRINE 10 MG: 10 TABLET, FILM COATED ORAL at 17:02

## 2020-10-07 RX ADMIN — FAMOTIDINE 20 MG: 20 TABLET, FILM COATED ORAL at 06:40

## 2020-10-07 RX ADMIN — CYCLOBENZAPRINE 10 MG: 10 TABLET, FILM COATED ORAL at 08:49

## 2020-10-07 RX ADMIN — KETOROLAC TROMETHAMINE 15 MG: 30 INJECTION, SOLUTION INTRAMUSCULAR; INTRAVENOUS at 11:30

## 2020-10-07 RX ADMIN — PILOCARPINE HYDROCHLORIDE 5 MG: 5 TABLET, FILM COATED ORAL at 11:29

## 2020-10-07 RX ADMIN — DULOXETINE HYDROCHLORIDE 120 MG: 60 CAPSULE, DELAYED RELEASE ORAL at 06:40

## 2020-10-07 RX ADMIN — IPRATROPIUM BROMIDE 2 SPRAY: 21 SPRAY NASAL at 17:02

## 2020-10-07 RX ADMIN — TRAZODONE HYDROCHLORIDE 150 MG: 100 TABLET ORAL at 21:00

## 2020-10-07 RX ADMIN — KETOROLAC TROMETHAMINE 15 MG: 30 INJECTION, SOLUTION INTRAMUSCULAR; INTRAVENOUS at 01:19

## 2020-10-07 RX ADMIN — TRAMADOL HYDROCHLORIDE 50 MG: 50 TABLET ORAL at 06:40

## 2020-10-07 RX ADMIN — FAMOTIDINE 20 MG: 20 TABLET, FILM COATED ORAL at 17:02

## 2020-10-07 RX ADMIN — PILOCARPINE HYDROCHLORIDE 5 MG: 5 TABLET, FILM COATED ORAL at 06:40

## 2020-10-07 ASSESSMENT — PAIN DESCRIPTION - PAIN TYPE
TYPE: ACUTE PAIN

## 2020-10-07 NOTE — PROGRESS NOTES
"10/7  S:  70 y.o.female s/p Open Katz's Reversal  POD# 8.  Patient doing steadily better, still with a lot of back pain, passing flatus and some minimal liquid stool.  NGT was removed yesterday and she wants to advance diet if possible.  NOEL drain removed as well.      O:  /84   Pulse 79   Temp 36.9 °C (98.5 °F) (Temporal)   Resp 18   Ht 1.626 m (5' 4\")   Wt 55 kg (121 lb 4.1 oz)   SpO2 95%   I/O last 3 completed shifts:  In: 1650 [P.O.:250; I.V.:1400]  Out: 565 [Drains:15]  Recent Labs     10/05/20  0532 10/06/20  0801 10/07/20  0208   SODIUM 134* 139 138   POTASSIUM 3.2* 3.5* 3.4*   CHLORIDE 96 100 102   CO2 25 24 25   GLUCOSE 140* 108* 120*   BUN 12 17 19   CREATININE 0.48* 0.44* 0.57   CALCIUM 9.1 9.4 8.8     Recent Labs     10/05/20  0532 10/06/20  0801 10/07/20  0208   WBC 10.2 11.3* 8.3   RBC 3.96* 4.15* 3.60*   HEMOGLOBIN 12.8 13.5 11.5*   HEMATOCRIT 36.6* 40.0 34.5*   MCV 92.4 96.4 95.8   MCH 32.3 32.5 31.9   MCHC 35.0 33.8 33.3*   RDW 43.3 46.0 45.9   PLATELETCT 355 402 367   MPV 9.4 9.7 9.1     CRP remains low at 1.4 today    Alert and Oriented x3, No Acute Distress  Normal Respiratory Effort  Abdomen soft, appropriately tender  Incisions/Bandages clean/dry/intact  Extremities warm and well perfused    A/P:  Pain control with PO meds, trying to avoid narcotics, will try flexaril today for back pain  Diet advanced slowly, liquids ok today  Fluids continue until meeting needs PO  Replace K  Ambulate tid and ad brandee  Remove staples from midline wound    "

## 2020-10-07 NOTE — PROGRESS NOTES
Report received. Assessment completed.  Pt is A&O x4. Pt on room air.   Medicating for pain PRN per MAR   Pt states back pain is not like back pain she's had previously and the pain is increasing-Updated MD  Denies N/V at this time  +small mucousy BM   +void.  Tolerating clear liquid diet   Pt up with SBA.  Call light and belongings within reach. All needs met at this time. Fall Precautions and hourly rounding in place

## 2020-10-08 ENCOUNTER — APPOINTMENT (OUTPATIENT)
Dept: RADIOLOGY | Facility: MEDICAL CENTER | Age: 70
DRG: 329 | End: 2020-10-08
Attending: SURGERY
Payer: MEDICARE

## 2020-10-08 ENCOUNTER — ANESTHESIA (OUTPATIENT)
Dept: SURGERY | Facility: MEDICAL CENTER | Age: 70
DRG: 329 | End: 2020-10-08
Payer: MEDICARE

## 2020-10-08 ENCOUNTER — ANESTHESIA EVENT (OUTPATIENT)
Dept: SURGERY | Facility: MEDICAL CENTER | Age: 70
DRG: 329 | End: 2020-10-08
Payer: MEDICARE

## 2020-10-08 ENCOUNTER — APPOINTMENT (OUTPATIENT)
Dept: RADIOLOGY | Facility: MEDICAL CENTER | Age: 70
DRG: 329 | End: 2020-10-08
Attending: ANESTHESIOLOGY
Payer: MEDICARE

## 2020-10-08 PROBLEM — K63.1 COLON PERFORATION (HCC): Status: ACTIVE | Noted: 2020-10-08

## 2020-10-08 PROBLEM — R91.8 MULTILOBAR LUNG INFILTRATE: Status: ACTIVE | Noted: 2020-10-08

## 2020-10-08 LAB
ANION GAP SERPL CALC-SCNC: 11 MMOL/L (ref 7–16)
BUN SERPL-MCNC: 10 MG/DL (ref 8–22)
CALCIUM SERPL-MCNC: 8.6 MG/DL (ref 8.5–10.5)
CHLORIDE SERPL-SCNC: 97 MMOL/L (ref 96–112)
CO2 SERPL-SCNC: 23 MMOL/L (ref 20–33)
COVID ORDER STATUS COVID19: NORMAL
CREAT SERPL-MCNC: 0.35 MG/DL (ref 0.5–1.4)
CRP SERPL HS-MCNC: 12.85 MG/DL (ref 0–0.75)
ERYTHROCYTE [DISTWIDTH] IN BLOOD BY AUTOMATED COUNT: 42.6 FL (ref 35.9–50)
GLUCOSE BLD-MCNC: 127 MG/DL (ref 65–99)
GLUCOSE SERPL-MCNC: 132 MG/DL (ref 65–99)
HCT VFR BLD AUTO: 31.9 % (ref 37–47)
HGB BLD-MCNC: 11.4 G/DL (ref 12–16)
MCH RBC QN AUTO: 32.9 PG (ref 27–33)
MCHC RBC AUTO-ENTMCNC: 35.7 G/DL (ref 33.6–35)
MCV RBC AUTO: 91.9 FL (ref 81.4–97.8)
PLATELET # BLD AUTO: 393 K/UL (ref 164–446)
PMV BLD AUTO: 9.9 FL (ref 9–12.9)
POTASSIUM SERPL-SCNC: 3.8 MMOL/L (ref 3.6–5.5)
RBC # BLD AUTO: 3.47 M/UL (ref 4.2–5.4)
SARS-COV-2 RDRP RESP QL NAA+PROBE: NOTDETECTED
SODIUM SERPL-SCNC: 131 MMOL/L (ref 135–145)
SPECIMEN SOURCE: NORMAL
TROPONIN T SERPL-MCNC: 10 NG/L (ref 6–19)
WBC # BLD AUTO: 19.2 K/UL (ref 4.8–10.8)

## 2020-10-08 PROCEDURE — 770006 HCHG ROOM/CARE - MED/SURG/GYN SEMI*

## 2020-10-08 PROCEDURE — 501428 HCHG STAPLER, CURVED: Performed by: SURGERY

## 2020-10-08 PROCEDURE — 87102 FUNGUS ISOLATION CULTURE: CPT

## 2020-10-08 PROCEDURE — 0D1B0Z4 BYPASS ILEUM TO CUTANEOUS, OPEN APPROACH: ICD-10-PCS | Performed by: SURGERY

## 2020-10-08 PROCEDURE — 87205 SMEAR GRAM STAIN: CPT

## 2020-10-08 PROCEDURE — 74176 CT ABD & PELVIS W/O CONTRAST: CPT

## 2020-10-08 PROCEDURE — 700111 HCHG RX REV CODE 636 W/ 250 OVERRIDE (IP): Performed by: ANESTHESIOLOGY

## 2020-10-08 PROCEDURE — 86140 C-REACTIVE PROTEIN: CPT

## 2020-10-08 PROCEDURE — A9270 NON-COVERED ITEM OR SERVICE: HCPCS | Performed by: SURGERY

## 2020-10-08 PROCEDURE — U0004 COV-19 TEST NON-CDC HGH THRU: HCPCS

## 2020-10-08 PROCEDURE — 74177 CT ABD & PELVIS W/CONTRAST: CPT

## 2020-10-08 PROCEDURE — 87070 CULTURE OTHR SPECIMN AEROBIC: CPT

## 2020-10-08 PROCEDURE — A6402 STERILE GAUZE <= 16 SQ IN: HCPCS | Performed by: SURGERY

## 2020-10-08 PROCEDURE — 700111 HCHG RX REV CODE 636 W/ 250 OVERRIDE (IP): Performed by: SURGERY

## 2020-10-08 PROCEDURE — 160002 HCHG RECOVERY MINUTES (STAT): Performed by: SURGERY

## 2020-10-08 PROCEDURE — 87075 CULTR BACTERIA EXCEPT BLOOD: CPT

## 2020-10-08 PROCEDURE — 0DTF0ZZ RESECTION OF RIGHT LARGE INTESTINE, OPEN APPROACH: ICD-10-PCS | Performed by: SURGERY

## 2020-10-08 PROCEDURE — 82962 GLUCOSE BLOOD TEST: CPT

## 2020-10-08 PROCEDURE — 160009 HCHG ANES TIME/MIN: Performed by: SURGERY

## 2020-10-08 PROCEDURE — 80048 BASIC METABOLIC PNL TOTAL CA: CPT

## 2020-10-08 PROCEDURE — 71045 X-RAY EXAM CHEST 1 VIEW: CPT

## 2020-10-08 PROCEDURE — 160048 HCHG OR STATISTICAL LEVEL 1-5: Performed by: SURGERY

## 2020-10-08 PROCEDURE — C9803 HOPD COVID-19 SPEC COLLECT: HCPCS | Performed by: SURGERY

## 2020-10-08 PROCEDURE — 87186 SC STD MICRODIL/AGAR DIL: CPT | Mod: 91

## 2020-10-08 PROCEDURE — 87116 MYCOBACTERIA CULTURE: CPT

## 2020-10-08 PROCEDURE — 501452 HCHG STAPLES, GIA MULTIFIRE 60/80: Performed by: SURGERY

## 2020-10-08 PROCEDURE — 501433 HCHG STAPLER, GIA MULTIFIRE 60/80: Performed by: SURGERY

## 2020-10-08 PROCEDURE — 700101 HCHG RX REV CODE 250: Performed by: ANESTHESIOLOGY

## 2020-10-08 PROCEDURE — 87206 SMEAR FLUORESCENT/ACID STAI: CPT

## 2020-10-08 PROCEDURE — 160042 HCHG SURGERY MINUTES - EA ADDL 1 MIN LEVEL 5: Performed by: SURGERY

## 2020-10-08 PROCEDURE — 93005 ELECTROCARDIOGRAM TRACING: CPT | Performed by: SURGERY

## 2020-10-08 PROCEDURE — 84484 ASSAY OF TROPONIN QUANT: CPT

## 2020-10-08 PROCEDURE — 87077 CULTURE AEROBIC IDENTIFY: CPT | Mod: 91

## 2020-10-08 PROCEDURE — 160031 HCHG SURGERY MINUTES - 1ST 30 MINS LEVEL 5: Performed by: SURGERY

## 2020-10-08 PROCEDURE — 700102 HCHG RX REV CODE 250 W/ 637 OVERRIDE(OP): Performed by: SURGERY

## 2020-10-08 PROCEDURE — 501838 HCHG SUTURE GENERAL: Performed by: SURGERY

## 2020-10-08 PROCEDURE — 87015 SPECIMEN INFECT AGNT CONCNTJ: CPT

## 2020-10-08 PROCEDURE — 502704 HCHG DEVICE, LIGASURE IMPACT: Performed by: SURGERY

## 2020-10-08 PROCEDURE — 85027 COMPLETE CBC AUTOMATED: CPT

## 2020-10-08 PROCEDURE — 87106 FUNGI IDENTIFICATION YEAST: CPT

## 2020-10-08 PROCEDURE — 160035 HCHG PACU - 1ST 60 MINS PHASE I: Performed by: SURGERY

## 2020-10-08 PROCEDURE — 700117 HCHG RX CONTRAST REV CODE 255: Performed by: SURGERY

## 2020-10-08 PROCEDURE — 700105 HCHG RX REV CODE 258: Performed by: ANESTHESIOLOGY

## 2020-10-08 PROCEDURE — 88307 TISSUE EXAM BY PATHOLOGIST: CPT

## 2020-10-08 PROCEDURE — 501435 HCHG STAPLER, LINEAR 60: Performed by: SURGERY

## 2020-10-08 RX ORDER — HYDROMORPHONE HYDROCHLORIDE 1 MG/ML
0.1 INJECTION, SOLUTION INTRAMUSCULAR; INTRAVENOUS; SUBCUTANEOUS
Status: DISCONTINUED | OUTPATIENT
Start: 2020-10-08 | End: 2020-10-09 | Stop reason: HOSPADM

## 2020-10-08 RX ORDER — CEFOTETAN DISODIUM 2 G/20ML
INJECTION, POWDER, FOR SOLUTION INTRAMUSCULAR; INTRAVENOUS PRN
Status: DISCONTINUED | OUTPATIENT
Start: 2020-10-08 | End: 2020-10-08 | Stop reason: SURG

## 2020-10-08 RX ORDER — DIPHENHYDRAMINE HYDROCHLORIDE 50 MG/ML
12.5 INJECTION INTRAMUSCULAR; INTRAVENOUS
Status: DISCONTINUED | OUTPATIENT
Start: 2020-10-08 | End: 2020-10-08 | Stop reason: HOSPADM

## 2020-10-08 RX ORDER — DIPHENHYDRAMINE HYDROCHLORIDE 50 MG/ML
12.5 INJECTION INTRAMUSCULAR; INTRAVENOUS
Status: DISCONTINUED | OUTPATIENT
Start: 2020-10-08 | End: 2020-10-09 | Stop reason: HOSPADM

## 2020-10-08 RX ORDER — HYDROMORPHONE HYDROCHLORIDE 1 MG/ML
0.4 INJECTION, SOLUTION INTRAMUSCULAR; INTRAVENOUS; SUBCUTANEOUS
Status: DISCONTINUED | OUTPATIENT
Start: 2020-10-08 | End: 2020-10-09 | Stop reason: HOSPADM

## 2020-10-08 RX ORDER — KETOROLAC TROMETHAMINE 30 MG/ML
15 INJECTION, SOLUTION INTRAMUSCULAR; INTRAVENOUS EVERY 6 HOURS
Status: COMPLETED | OUTPATIENT
Start: 2020-10-08 | End: 2020-10-11

## 2020-10-08 RX ORDER — HYDROMORPHONE HYDROCHLORIDE 1 MG/ML
0.1 INJECTION, SOLUTION INTRAMUSCULAR; INTRAVENOUS; SUBCUTANEOUS
Status: DISCONTINUED | OUTPATIENT
Start: 2020-10-08 | End: 2020-10-08 | Stop reason: HOSPADM

## 2020-10-08 RX ORDER — SODIUM CHLORIDE, SODIUM LACTATE, POTASSIUM CHLORIDE, CALCIUM CHLORIDE 600; 310; 30; 20 MG/100ML; MG/100ML; MG/100ML; MG/100ML
INJECTION, SOLUTION INTRAVENOUS CONTINUOUS
Status: DISCONTINUED | OUTPATIENT
Start: 2020-10-08 | End: 2020-10-09 | Stop reason: HOSPADM

## 2020-10-08 RX ORDER — METOPROLOL TARTRATE 1 MG/ML
1 INJECTION, SOLUTION INTRAVENOUS
Status: DISCONTINUED | OUTPATIENT
Start: 2020-10-08 | End: 2020-10-09 | Stop reason: HOSPADM

## 2020-10-08 RX ORDER — OXYCODONE HCL 5 MG/5 ML
10 SOLUTION, ORAL ORAL
Status: DISCONTINUED | OUTPATIENT
Start: 2020-10-08 | End: 2020-10-09 | Stop reason: HOSPADM

## 2020-10-08 RX ORDER — OXYCODONE HCL 5 MG/5 ML
10 SOLUTION, ORAL ORAL
Status: DISCONTINUED | OUTPATIENT
Start: 2020-10-08 | End: 2020-10-08 | Stop reason: HOSPADM

## 2020-10-08 RX ORDER — OXYCODONE HCL 5 MG/5 ML
5 SOLUTION, ORAL ORAL
Status: DISCONTINUED | OUTPATIENT
Start: 2020-10-08 | End: 2020-10-09 | Stop reason: HOSPADM

## 2020-10-08 RX ORDER — LABETALOL HYDROCHLORIDE 5 MG/ML
5 INJECTION, SOLUTION INTRAVENOUS
Status: DISCONTINUED | OUTPATIENT
Start: 2020-10-08 | End: 2020-10-09 | Stop reason: HOSPADM

## 2020-10-08 RX ORDER — HALOPERIDOL 5 MG/ML
1 INJECTION INTRAMUSCULAR
Status: DISCONTINUED | OUTPATIENT
Start: 2020-10-08 | End: 2020-10-08 | Stop reason: HOSPADM

## 2020-10-08 RX ORDER — HYDROMORPHONE HYDROCHLORIDE 1 MG/ML
0.2 INJECTION, SOLUTION INTRAMUSCULAR; INTRAVENOUS; SUBCUTANEOUS
Status: DISCONTINUED | OUTPATIENT
Start: 2020-10-08 | End: 2020-10-09 | Stop reason: HOSPADM

## 2020-10-08 RX ORDER — LABETALOL HYDROCHLORIDE 5 MG/ML
5 INJECTION, SOLUTION INTRAVENOUS
Status: DISCONTINUED | OUTPATIENT
Start: 2020-10-08 | End: 2020-10-08 | Stop reason: HOSPADM

## 2020-10-08 RX ORDER — HYDROMORPHONE HYDROCHLORIDE 1 MG/ML
0.4 INJECTION, SOLUTION INTRAMUSCULAR; INTRAVENOUS; SUBCUTANEOUS
Status: DISCONTINUED | OUTPATIENT
Start: 2020-10-08 | End: 2020-10-08 | Stop reason: HOSPADM

## 2020-10-08 RX ORDER — DEXAMETHASONE SODIUM PHOSPHATE 4 MG/ML
INJECTION, SOLUTION INTRA-ARTICULAR; INTRALESIONAL; INTRAMUSCULAR; INTRAVENOUS; SOFT TISSUE PRN
Status: DISCONTINUED | OUTPATIENT
Start: 2020-10-08 | End: 2020-10-08 | Stop reason: SURG

## 2020-10-08 RX ORDER — LIDOCAINE HYDROCHLORIDE 20 MG/ML
INJECTION, SOLUTION EPIDURAL; INFILTRATION; INTRACAUDAL; PERINEURAL PRN
Status: DISCONTINUED | OUTPATIENT
Start: 2020-10-08 | End: 2020-10-08 | Stop reason: SURG

## 2020-10-08 RX ORDER — HYDROMORPHONE HYDROCHLORIDE 2 MG/ML
INJECTION, SOLUTION INTRAMUSCULAR; INTRAVENOUS; SUBCUTANEOUS PRN
Status: DISCONTINUED | OUTPATIENT
Start: 2020-10-08 | End: 2020-10-08 | Stop reason: SURG

## 2020-10-08 RX ORDER — HALOPERIDOL 5 MG/ML
1 INJECTION INTRAMUSCULAR
Status: DISCONTINUED | OUTPATIENT
Start: 2020-10-08 | End: 2020-10-09 | Stop reason: HOSPADM

## 2020-10-08 RX ORDER — OXYCODONE HCL 5 MG/5 ML
5 SOLUTION, ORAL ORAL
Status: DISCONTINUED | OUTPATIENT
Start: 2020-10-08 | End: 2020-10-08 | Stop reason: HOSPADM

## 2020-10-08 RX ORDER — MEPERIDINE HYDROCHLORIDE 25 MG/ML
12.5 INJECTION INTRAMUSCULAR; INTRAVENOUS; SUBCUTANEOUS
Status: DISCONTINUED | OUTPATIENT
Start: 2020-10-08 | End: 2020-10-08 | Stop reason: HOSPADM

## 2020-10-08 RX ORDER — ONDANSETRON 2 MG/ML
4 INJECTION INTRAMUSCULAR; INTRAVENOUS
Status: DISCONTINUED | OUTPATIENT
Start: 2020-10-08 | End: 2020-10-08 | Stop reason: HOSPADM

## 2020-10-08 RX ORDER — METOPROLOL TARTRATE 1 MG/ML
1 INJECTION, SOLUTION INTRAVENOUS
Status: DISCONTINUED | OUTPATIENT
Start: 2020-10-08 | End: 2020-10-08 | Stop reason: HOSPADM

## 2020-10-08 RX ORDER — SODIUM CHLORIDE, SODIUM LACTATE, POTASSIUM CHLORIDE, CALCIUM CHLORIDE 600; 310; 30; 20 MG/100ML; MG/100ML; MG/100ML; MG/100ML
INJECTION, SOLUTION INTRAVENOUS
Status: DISCONTINUED | OUTPATIENT
Start: 2020-10-08 | End: 2020-10-08 | Stop reason: SURG

## 2020-10-08 RX ORDER — SODIUM CHLORIDE, SODIUM LACTATE, POTASSIUM CHLORIDE, CALCIUM CHLORIDE 600; 310; 30; 20 MG/100ML; MG/100ML; MG/100ML; MG/100ML
INJECTION, SOLUTION INTRAVENOUS CONTINUOUS
Status: DISCONTINUED | OUTPATIENT
Start: 2020-10-09 | End: 2020-10-09 | Stop reason: HOSPADM

## 2020-10-08 RX ORDER — SODIUM CHLORIDE, SODIUM LACTATE, POTASSIUM CHLORIDE, CALCIUM CHLORIDE 600; 310; 30; 20 MG/100ML; MG/100ML; MG/100ML; MG/100ML
INJECTION, SOLUTION INTRAVENOUS CONTINUOUS
Status: ACTIVE | OUTPATIENT
Start: 2020-10-08 | End: 2020-10-09

## 2020-10-08 RX ORDER — MEPERIDINE HYDROCHLORIDE 25 MG/ML
12.5 INJECTION INTRAMUSCULAR; INTRAVENOUS; SUBCUTANEOUS
Status: DISCONTINUED | OUTPATIENT
Start: 2020-10-08 | End: 2020-10-09 | Stop reason: HOSPADM

## 2020-10-08 RX ORDER — PHENYLEPHRINE HYDROCHLORIDE 10 MG/ML
INJECTION, SOLUTION INTRAMUSCULAR; INTRAVENOUS; SUBCUTANEOUS PRN
Status: DISCONTINUED | OUTPATIENT
Start: 2020-10-08 | End: 2020-10-08 | Stop reason: SURG

## 2020-10-08 RX ORDER — HYDROMORPHONE HYDROCHLORIDE 1 MG/ML
0.2 INJECTION, SOLUTION INTRAMUSCULAR; INTRAVENOUS; SUBCUTANEOUS
Status: DISCONTINUED | OUTPATIENT
Start: 2020-10-08 | End: 2020-10-08 | Stop reason: HOSPADM

## 2020-10-08 RX ORDER — ONDANSETRON 2 MG/ML
4 INJECTION INTRAMUSCULAR; INTRAVENOUS
Status: DISCONTINUED | OUTPATIENT
Start: 2020-10-08 | End: 2020-10-09 | Stop reason: HOSPADM

## 2020-10-08 RX ORDER — ONDANSETRON 2 MG/ML
INJECTION INTRAMUSCULAR; INTRAVENOUS PRN
Status: DISCONTINUED | OUTPATIENT
Start: 2020-10-08 | End: 2020-10-08 | Stop reason: SURG

## 2020-10-08 RX ADMIN — PROPOFOL 100 MG: 10 INJECTION, EMULSION INTRAVENOUS at 21:06

## 2020-10-08 RX ADMIN — ENOXAPARIN SODIUM 40 MG: 40 INJECTION SUBCUTANEOUS at 05:39

## 2020-10-08 RX ADMIN — DEXAMETHASONE SODIUM PHOSPHATE 4 MG: 4 INJECTION, SOLUTION INTRA-ARTICULAR; INTRALESIONAL; INTRAMUSCULAR; INTRAVENOUS; SOFT TISSUE at 21:13

## 2020-10-08 RX ADMIN — Medication 60 MG: at 21:06

## 2020-10-08 RX ADMIN — TIMOLOL MALEATE 1 DROP: 5 SOLUTION/ DROPS OPHTHALMIC at 16:48

## 2020-10-08 RX ADMIN — HYDROMORPHONE HYDROCHLORIDE 0.6 MG: 2 INJECTION, SOLUTION INTRAMUSCULAR; INTRAVENOUS; SUBCUTANEOUS at 21:06

## 2020-10-08 RX ADMIN — HYDROMORPHONE HYDROCHLORIDE 0.2 MG: 2 INJECTION, SOLUTION INTRAMUSCULAR; INTRAVENOUS; SUBCUTANEOUS at 22:12

## 2020-10-08 RX ADMIN — FAMOTIDINE 20 MG: 20 TABLET, FILM COATED ORAL at 16:48

## 2020-10-08 RX ADMIN — PILOCARPINE HYDROCHLORIDE 5 MG: 5 TABLET, FILM COATED ORAL at 16:47

## 2020-10-08 RX ADMIN — CEFOTETAN DISODIUM 2 G: 2 INJECTION, POWDER, FOR SOLUTION INTRAMUSCULAR; INTRAVENOUS at 21:01

## 2020-10-08 RX ADMIN — CYCLOBENZAPRINE 10 MG: 10 TABLET, FILM COATED ORAL at 05:40

## 2020-10-08 RX ADMIN — ROCURONIUM BROMIDE 50 MG: 10 INJECTION, SOLUTION INTRAVENOUS at 21:06

## 2020-10-08 RX ADMIN — ONDANSETRON 4 MG: 2 INJECTION INTRAMUSCULAR; INTRAVENOUS at 05:45

## 2020-10-08 RX ADMIN — ONDANSETRON 4 MG: 2 INJECTION INTRAMUSCULAR; INTRAVENOUS at 16:45

## 2020-10-08 RX ADMIN — DORZOLAMIDE HYDROCHLORIDE 1 DROP: 20 SOLUTION/ DROPS OPHTHALMIC at 16:48

## 2020-10-08 RX ADMIN — PILOCARPINE HYDROCHLORIDE 5 MG: 5 TABLET, FILM COATED ORAL at 05:39

## 2020-10-08 RX ADMIN — FAMOTIDINE 20 MG: 20 TABLET, FILM COATED ORAL at 05:40

## 2020-10-08 RX ADMIN — TRAMADOL HYDROCHLORIDE 50 MG: 50 TABLET ORAL at 10:32

## 2020-10-08 RX ADMIN — TRAMADOL HYDROCHLORIDE 50 MG: 50 TABLET ORAL at 16:48

## 2020-10-08 RX ADMIN — TIMOLOL MALEATE 1 DROP: 5 SOLUTION/ DROPS OPHTHALMIC at 05:38

## 2020-10-08 RX ADMIN — LIDOCAINE HYDROCHLORIDE 50 MG: 20 INJECTION, SOLUTION EPIDURAL; INFILTRATION; INTRACAUDAL at 21:06

## 2020-10-08 RX ADMIN — IOHEXOL 100 ML: 350 INJECTION, SOLUTION INTRAVENOUS at 13:11

## 2020-10-08 RX ADMIN — SODIUM CHLORIDE, POTASSIUM CHLORIDE, SODIUM LACTATE AND CALCIUM CHLORIDE: 600; 310; 30; 20 INJECTION, SOLUTION INTRAVENOUS at 22:22

## 2020-10-08 RX ADMIN — ONDANSETRON 4 MG: 2 INJECTION INTRAMUSCULAR; INTRAVENOUS at 10:32

## 2020-10-08 RX ADMIN — PHENYLEPHRINE HYDROCHLORIDE 100 MCG: 10 INJECTION INTRAVENOUS at 21:11

## 2020-10-08 RX ADMIN — SODIUM CHLORIDE, POTASSIUM CHLORIDE, SODIUM LACTATE AND CALCIUM CHLORIDE: 600; 310; 30; 20 INJECTION, SOLUTION INTRAVENOUS at 20:50

## 2020-10-08 RX ADMIN — DULOXETINE HYDROCHLORIDE 120 MG: 60 CAPSULE, DELAYED RELEASE ORAL at 05:40

## 2020-10-08 RX ADMIN — SUGAMMADEX 120 MG: 100 INJECTION, SOLUTION INTRAVENOUS at 22:42

## 2020-10-08 RX ADMIN — ONDANSETRON 4 MG: 2 INJECTION INTRAMUSCULAR; INTRAVENOUS at 21:13

## 2020-10-08 RX ADMIN — HALOPERIDOL LACTATE 1 MG: 5 INJECTION, SOLUTION INTRAMUSCULAR at 19:04

## 2020-10-08 RX ADMIN — IPRATROPIUM BROMIDE 2 SPRAY: 21 SPRAY NASAL at 05:40

## 2020-10-08 RX ADMIN — DORZOLAMIDE HYDROCHLORIDE 1 DROP: 20 SOLUTION/ DROPS OPHTHALMIC at 05:38

## 2020-10-08 RX ADMIN — PHENYLEPHRINE HYDROCHLORIDE 100 MCG: 10 INJECTION INTRAVENOUS at 21:06

## 2020-10-08 RX ADMIN — IPRATROPIUM BROMIDE 2 SPRAY: 21 SPRAY NASAL at 16:48

## 2020-10-08 ASSESSMENT — PAIN DESCRIPTION - PAIN TYPE
TYPE: ACUTE PAIN

## 2020-10-08 ASSESSMENT — PAIN SCALES - GENERAL: PAIN_LEVEL: 3

## 2020-10-08 NOTE — CARE PLAN
Problem: Pain Management  Goal: Pain level will decrease to patient's comfort goal  Outcome: PROGRESSING SLOWER THAN EXPECTED  Patient using pain medication as needed, however pain is significant today d/t distention in abdomen.   Problem: Bowel/Gastric:  Goal: Normal bowel function is maintained or improved  Outcome: PROGRESSING SLOWER THAN EXPECTED  Patient having mucous smears of BM. This morning she did have a brown, loose, but very small BM. Abdomen is distended.

## 2020-10-08 NOTE — PROGRESS NOTES
"Assumed care of patient. Patient laying flat trying to rest. Abdomen is tender, firm and distended. Patient stating pain is \"different and worse\" than yesterday. Patient also nauseous where she hasn't been. Midline incision to abdomen is approximated with steri strips. Patient will have a CT to determine status of abdomen. No other needs at this time. Call light within reach.   "

## 2020-10-08 NOTE — DIETARY
Nutrition Services: Update   Day 9 of admit.  Tamiko Cabrera is a 70 y.o. female with admitting DX of PARASTOMAL HERNIA, COLOSTOMY IN PLACE, Para-ileostomy hernia    Pt NPO/clear liquids x9 days. MD considering TPN if unable to advance diet today. Pt would benefit from Thiamine 100 mg to minimize risk of refeeding.     Malnutrition Risk: Increased risk d/t NPO/Clears x9 days    Recommendations/Plan:  1. Advance diet as medically able vs TPN   2. 100 mg Thiamine to minimize risk of refeeding    RD following

## 2020-10-08 NOTE — CARE PLAN
Problem: Nutritional:  Goal: Achieve adequate nutritional intake  Description: Diet will advance >NPO/clear liquid diet. Patient will consume >50% of meals.   Outcome: NOT MET     See RD note

## 2020-10-08 NOTE — CARE PLAN
Problem: Communication  Goal: The ability to communicate needs accurately and effectively will improve  Outcome: PROGRESSING AS EXPECTED  Note: Pt educated to call when in need of assistance. Call light within reach. All needs met at this time.      Problem: Safety  Goal: Will remain free from injury  Outcome: PROGRESSING AS EXPECTED  Note: Pt educated on safety and fall risk. Call light within reach. Bed is locked and in lowest position.   Goal: Will remain free from falls  Outcome: PROGRESSING AS EXPECTED

## 2020-10-08 NOTE — PROGRESS NOTES
"Received report from THIEN Villa. Assumed care at 1915. This pt is AOx4, ambulatory with standby assitance, denies N/V this evening, (-) flatus, pt complaining of \"feeling full\", voiding appropriately, last BM: 10/07/20, reporting back pain that is 10/10, cold pack provided. Patient and RN discussed plan of care, all questions answered. Labs noted, assessment complete, patient tolerating clear liquids diet. Call light in place, personal belongings available, bed in lowest position, upper bedrails up, patient educated on importance of calling for assistance, hourly rounding in place. No additional needs at this time. VSS.   "

## 2020-10-09 ENCOUNTER — APPOINTMENT (OUTPATIENT)
Dept: RADIOLOGY | Facility: MEDICAL CENTER | Age: 70
DRG: 329 | End: 2020-10-09
Attending: SURGERY
Payer: MEDICARE

## 2020-10-09 PROBLEM — Z78.9 NO CONTRAINDICATION TO DEEP VEIN THROMBOSIS (DVT) PROPHYLAXIS: Status: ACTIVE | Noted: 2020-10-09

## 2020-10-09 PROBLEM — Z11.9 SCREENING EXAMINATION FOR INFECTIOUS DISEASE: Status: ACTIVE | Noted: 2020-10-09

## 2020-10-09 LAB
ALBUMIN SERPL BCP-MCNC: 2.1 G/DL (ref 3.2–4.9)
ALBUMIN/GLOB SERPL: 1.2 G/DL
ALP SERPL-CCNC: 49 U/L (ref 30–99)
ALT SERPL-CCNC: 16 U/L (ref 2–50)
ANION GAP SERPL CALC-SCNC: 6 MMOL/L (ref 7–16)
AST SERPL-CCNC: 15 U/L (ref 12–45)
BASOPHILS # BLD AUTO: 0.5 % (ref 0–1.8)
BASOPHILS # BLD: 0.09 K/UL (ref 0–0.12)
BILIRUB SERPL-MCNC: 2.4 MG/DL (ref 0.1–1.5)
BUN SERPL-MCNC: 10 MG/DL (ref 8–22)
CALCIUM SERPL-MCNC: 7.6 MG/DL (ref 8.5–10.5)
CHLORIDE SERPL-SCNC: 99 MMOL/L (ref 96–112)
CHOLEST SERPL-MCNC: 118 MG/DL (ref 100–199)
CO2 SERPL-SCNC: 26 MMOL/L (ref 20–33)
CREAT SERPL-MCNC: 0.47 MG/DL (ref 0.5–1.4)
EKG IMPRESSION: NORMAL
EOSINOPHIL # BLD AUTO: 0 K/UL (ref 0–0.51)
EOSINOPHIL NFR BLD: 0 % (ref 0–6.9)
ERYTHROCYTE [DISTWIDTH] IN BLOOD BY AUTOMATED COUNT: 44.4 FL (ref 35.9–50)
GLOBULIN SER CALC-MCNC: 1.7 G/DL (ref 1.9–3.5)
GLUCOSE BLD-MCNC: 131 MG/DL (ref 65–99)
GLUCOSE SERPL-MCNC: 141 MG/DL (ref 65–99)
GRAM STN SPEC: NORMAL
HCT VFR BLD AUTO: 31.4 % (ref 37–47)
HGB BLD-MCNC: 10.9 G/DL (ref 12–16)
IMM GRANULOCYTES # BLD AUTO: 0.1 K/UL (ref 0–0.11)
IMM GRANULOCYTES NFR BLD AUTO: 0.6 % (ref 0–0.9)
LACTATE BLD-SCNC: 0.9 MMOL/L (ref 0.5–2)
LYMPHOCYTES # BLD AUTO: 0.5 K/UL (ref 1–4.8)
LYMPHOCYTES NFR BLD: 2.9 % (ref 22–41)
MAGNESIUM SERPL-MCNC: 1.5 MG/DL (ref 1.5–2.5)
MCH RBC QN AUTO: 32.9 PG (ref 27–33)
MCHC RBC AUTO-ENTMCNC: 34.7 G/DL (ref 33.6–35)
MCV RBC AUTO: 94.9 FL (ref 81.4–97.8)
MONOCYTES # BLD AUTO: 0.67 K/UL (ref 0–0.85)
MONOCYTES NFR BLD AUTO: 3.9 % (ref 0–13.4)
NEUTROPHILS # BLD AUTO: 15.64 K/UL (ref 2–7.15)
NEUTROPHILS NFR BLD: 92.1 % (ref 44–72)
NRBC # BLD AUTO: 0 K/UL
NRBC BLD-RTO: 0 /100 WBC
PATHOLOGY CONSULT NOTE: NORMAL
PHOSPHATE SERPL-MCNC: 3.6 MG/DL (ref 2.5–4.5)
PLATELET # BLD AUTO: 371 K/UL (ref 164–446)
PMV BLD AUTO: 9 FL (ref 9–12.9)
POTASSIUM SERPL-SCNC: 3.8 MMOL/L (ref 3.6–5.5)
PROT SERPL-MCNC: 3.8 G/DL (ref 6–8.2)
RBC # BLD AUTO: 3.31 M/UL (ref 4.2–5.4)
RHODAMINE-AURAMINE STN SPEC: NORMAL
SIGNIFICANT IND 70042: NORMAL
SIGNIFICANT IND 70042: NORMAL
SITE SITE: NORMAL
SITE SITE: NORMAL
SODIUM SERPL-SCNC: 131 MMOL/L (ref 135–145)
SOURCE SOURCE: NORMAL
SOURCE SOURCE: NORMAL
TRIGL SERPL-MCNC: 94 MG/DL (ref 0–149)
WBC # BLD AUTO: 17 K/UL (ref 4.8–10.8)

## 2020-10-09 PROCEDURE — 700105 HCHG RX REV CODE 258: Performed by: SURGERY

## 2020-10-09 PROCEDURE — 84100 ASSAY OF PHOSPHORUS: CPT

## 2020-10-09 PROCEDURE — 84478 ASSAY OF TRIGLYCERIDES: CPT

## 2020-10-09 PROCEDURE — 700101 HCHG RX REV CODE 250: Performed by: SURGERY

## 2020-10-09 PROCEDURE — 80053 COMPREHEN METABOLIC PANEL: CPT

## 2020-10-09 PROCEDURE — 302106 OSTOMY POWDER: Performed by: SURGERY

## 2020-10-09 PROCEDURE — 71045 X-RAY EXAM CHEST 1 VIEW: CPT

## 2020-10-09 PROCEDURE — 51798 US URINE CAPACITY MEASURE: CPT

## 2020-10-09 PROCEDURE — 700111 HCHG RX REV CODE 636 W/ 250 OVERRIDE (IP): Performed by: SURGERY

## 2020-10-09 PROCEDURE — 770022 HCHG ROOM/CARE - ICU (200)

## 2020-10-09 PROCEDURE — 85025 COMPLETE CBC W/AUTO DIFF WBC: CPT

## 2020-10-09 PROCEDURE — 83735 ASSAY OF MAGNESIUM: CPT

## 2020-10-09 PROCEDURE — 93010 ELECTROCARDIOGRAM REPORT: CPT | Performed by: INTERNAL MEDICINE

## 2020-10-09 PROCEDURE — 83605 ASSAY OF LACTIC ACID: CPT

## 2020-10-09 PROCEDURE — 82962 GLUCOSE BLOOD TEST: CPT

## 2020-10-09 PROCEDURE — A9270 NON-COVERED ITEM OR SERVICE: HCPCS | Performed by: SURGERY

## 2020-10-09 PROCEDURE — 302113 2 1/4 HIGH OUTPUT POUCH": Performed by: SURGERY

## 2020-10-09 PROCEDURE — 99291 CRITICAL CARE FIRST HOUR: CPT | Performed by: SURGERY

## 2020-10-09 PROCEDURE — 82465 ASSAY BLD/SERUM CHOLESTEROL: CPT

## 2020-10-09 PROCEDURE — 700102 HCHG RX REV CODE 250 W/ 637 OVERRIDE(OP): Performed by: SURGERY

## 2020-10-09 RX ORDER — SODIUM CHLORIDE, SODIUM LACTATE, POTASSIUM CHLORIDE, CALCIUM CHLORIDE 600; 310; 30; 20 MG/100ML; MG/100ML; MG/100ML; MG/100ML
INJECTION, SOLUTION INTRAVENOUS CONTINUOUS
Status: DISCONTINUED | OUTPATIENT
Start: 2020-10-09 | End: 2020-10-13

## 2020-10-09 RX ORDER — MAGNESIUM SULFATE HEPTAHYDRATE 40 MG/ML
4 INJECTION, SOLUTION INTRAVENOUS ONCE
Status: COMPLETED | OUTPATIENT
Start: 2020-10-09 | End: 2020-10-09

## 2020-10-09 RX ORDER — SODIUM CHLORIDE, SODIUM LACTATE, POTASSIUM CHLORIDE, AND CALCIUM CHLORIDE .6; .31; .03; .02 G/100ML; G/100ML; G/100ML; G/100ML
500 INJECTION, SOLUTION INTRAVENOUS ONCE
Status: COMPLETED | OUTPATIENT
Start: 2020-10-09 | End: 2020-10-09

## 2020-10-09 RX ORDER — SODIUM CHLORIDE, SODIUM LACTATE, POTASSIUM CHLORIDE, AND CALCIUM CHLORIDE .6; .31; .03; .02 G/100ML; G/100ML; G/100ML; G/100ML
250 INJECTION, SOLUTION INTRAVENOUS
Status: DISCONTINUED | OUTPATIENT
Start: 2020-10-09 | End: 2020-10-11

## 2020-10-09 RX ORDER — POTASSIUM CHLORIDE 14.9 MG/ML
20 INJECTION INTRAVENOUS ONCE
Status: COMPLETED | OUTPATIENT
Start: 2020-10-09 | End: 2020-10-09

## 2020-10-09 RX ORDER — SODIUM CHLORIDE, SODIUM LACTATE, POTASSIUM CHLORIDE, AND CALCIUM CHLORIDE .6; .31; .03; .02 G/100ML; G/100ML; G/100ML; G/100ML
250 INJECTION, SOLUTION INTRAVENOUS ONCE
Status: COMPLETED | OUTPATIENT
Start: 2020-10-09 | End: 2020-10-09

## 2020-10-09 RX ADMIN — KETOROLAC TROMETHAMINE 15 MG: 30 INJECTION, SOLUTION INTRAMUSCULAR at 05:33

## 2020-10-09 RX ADMIN — PIPERACILLIN AND TAZOBACTAM 3.38 G: 3; .375 INJECTION, POWDER, LYOPHILIZED, FOR SOLUTION INTRAVENOUS; PARENTERAL at 04:22

## 2020-10-09 RX ADMIN — DORZOLAMIDE HYDROCHLORIDE 1 DROP: 20 SOLUTION/ DROPS OPHTHALMIC at 11:52

## 2020-10-09 RX ADMIN — POTASSIUM CHLORIDE 20 MEQ: 14.9 INJECTION, SOLUTION INTRAVENOUS at 11:50

## 2020-10-09 RX ADMIN — PIPERACILLIN AND TAZOBACTAM 3.38 G: 3; .375 INJECTION, POWDER, LYOPHILIZED, FOR SOLUTION INTRAVENOUS; PARENTERAL at 02:04

## 2020-10-09 RX ADMIN — Medication: at 00:21

## 2020-10-09 RX ADMIN — TRAZODONE HYDROCHLORIDE 150 MG: 100 TABLET ORAL at 21:42

## 2020-10-09 RX ADMIN — KETOROLAC TROMETHAMINE 15 MG: 30 INJECTION, SOLUTION INTRAMUSCULAR at 17:35

## 2020-10-09 RX ADMIN — ENOXAPARIN SODIUM 40 MG: 40 INJECTION SUBCUTANEOUS at 05:33

## 2020-10-09 RX ADMIN — PIPERACILLIN AND TAZOBACTAM 3.38 G: 3; .375 INJECTION, POWDER, LYOPHILIZED, FOR SOLUTION INTRAVENOUS; PARENTERAL at 12:03

## 2020-10-09 RX ADMIN — KETOROLAC TROMETHAMINE 15 MG: 30 INJECTION, SOLUTION INTRAMUSCULAR at 00:34

## 2020-10-09 RX ADMIN — TIMOLOL MALEATE 1 DROP: 5 SOLUTION/ DROPS OPHTHALMIC at 17:35

## 2020-10-09 RX ADMIN — SODIUM CHLORIDE, POTASSIUM CHLORIDE, SODIUM LACTATE AND CALCIUM CHLORIDE: 600; 310; 30; 20 INJECTION, SOLUTION INTRAVENOUS at 08:30

## 2020-10-09 RX ADMIN — SODIUM CHLORIDE, POTASSIUM CHLORIDE, SODIUM LACTATE AND CALCIUM CHLORIDE 500 ML: 600; 310; 30; 20 INJECTION, SOLUTION INTRAVENOUS at 09:45

## 2020-10-09 RX ADMIN — DORZOLAMIDE HYDROCHLORIDE 1 DROP: 20 SOLUTION/ DROPS OPHTHALMIC at 17:49

## 2020-10-09 RX ADMIN — LATANOPROST 1 DROP: 50 SOLUTION OPHTHALMIC at 21:42

## 2020-10-09 RX ADMIN — SODIUM CHLORIDE, POTASSIUM CHLORIDE, SODIUM LACTATE AND CALCIUM CHLORIDE: 600; 310; 30; 20 INJECTION, SOLUTION INTRAVENOUS at 12:07

## 2020-10-09 RX ADMIN — KETOROLAC TROMETHAMINE 15 MG: 30 INJECTION, SOLUTION INTRAMUSCULAR at 11:50

## 2020-10-09 RX ADMIN — MAGNESIUM SULFATE IN WATER 4 G: 40 INJECTION, SOLUTION INTRAVENOUS at 13:55

## 2020-10-09 RX ADMIN — PIPERACILLIN AND TAZOBACTAM 3.38 G: 3; .375 INJECTION, POWDER, LYOPHILIZED, FOR SOLUTION INTRAVENOUS; PARENTERAL at 21:37

## 2020-10-09 RX ADMIN — TIMOLOL MALEATE 1 DROP: 5 SOLUTION/ DROPS OPHTHALMIC at 05:34

## 2020-10-09 RX ADMIN — SODIUM CHLORIDE, POTASSIUM CHLORIDE, SODIUM LACTATE AND CALCIUM CHLORIDE 250 ML: 600; 310; 30; 20 INJECTION, SOLUTION INTRAVENOUS at 14:46

## 2020-10-09 RX ADMIN — IPRATROPIUM BROMIDE 2 SPRAY: 21 SPRAY NASAL at 17:35

## 2020-10-09 RX ADMIN — IPRATROPIUM BROMIDE 2 SPRAY: 21 SPRAY NASAL at 05:33

## 2020-10-09 RX ADMIN — SODIUM CHLORIDE, POTASSIUM CHLORIDE, SODIUM LACTATE AND CALCIUM CHLORIDE: 600; 310; 30; 20 INJECTION, SOLUTION INTRAVENOUS at 00:21

## 2020-10-09 ASSESSMENT — PAIN DESCRIPTION - PAIN TYPE
TYPE: SURGICAL PAIN

## 2020-10-09 ASSESSMENT — FIBROSIS 4 INDEX: FIB4 SCORE: 0.87

## 2020-10-09 NOTE — CARE PLAN
Problem: Pain Management  Goal: Pain level will decrease to patient's comfort goal  Intervention: Follow pain managment plan developed in collaboration with patient and Interdisciplinary Team  Note: Pharmacological and nonpharmacological methods used to control pain. Pain assessed Q2.  Medications administered as needed per the MAR.      Problem: Knowledge Deficit  Goal: Knowledge of disease process/condition, treatment plan, diagnostic tests, and medications will improve  Intervention: Assess knowledge level of disease process/condition, treatment plan, diagnostic tests, and medications  Note: Patient updated on plan of care for the shift. All questions answered and needs met at this time.

## 2020-10-09 NOTE — PROGRESS NOTES
Assumed care of patient at 1915. Transported pt to Ct with THIEN Yao. Returned to floor with pt. Gave report to pre-op nurse. CHG bath and COVID swab completed. Transport took pt down to surgery.

## 2020-10-09 NOTE — ANESTHESIA POSTPROCEDURE EVALUATION
Patient: Tamiko Cabrera    Procedure Summary     Date: 10/08/20 Room / Location: [facility] 09 / SURGERY [facility]    Anesthesia Start: 2050 Anesthesia Stop: 2255    Procedure: LAPAROTOMY, EXPLORATORY (N/A Abdomen) Diagnosis: (PERFORATED COLON)    Surgeon: Kenn Boyce M.D. Responsible Provider: Sami Callaway M.D.    Anesthesia Type: general ASA Status: 3 - Emergent          Final Anesthesia Type: general  Last vitals  BP   Blood Pressure : 143/93    Temp   36.8 °C (98.3 °F)    Pulse   Pulse: (!) 133   Resp   16    SpO2   96 %      Anesthesia Post Evaluation    Patient location during evaluation: PACU  Patient participation: complete - patient participated  Level of consciousness: awake and alert  Pain score: 3    Airway patency: patent  Anesthetic complications: no  Cardiovascular status: hemodynamically stable  Respiratory status: acceptable  Hydration status: euvolemic    PONV: none

## 2020-10-09 NOTE — ANESTHESIA TIME REPORT
Anesthesia Start and Stop Event Times     Date Time Event    10/8/2020 2038 Ready for Procedure     2050 Anesthesia Start     2255 Anesthesia Stop        Responsible Staff  10/08/20    Name Role Begin End    Sami Callaway M.D. Anesth 2050 2255        Preop Diagnosis (Free Text):  Pre-op Diagnosis     PERFORATED COLON        Preop Diagnosis (Codes):    Post op Diagnosis  Perforated viscus      Premium Reason  A. 3PM - 7AM    Comments: procedure: ex-lap, colonic resection; emergency procedure, placement of central line (ultrasound used)

## 2020-10-09 NOTE — PROGRESS NOTES
Updated Dr. Oleary of marginal UOP - 25 mL/hr.  SR, BP 90-110s.   Orders received for  mL bolus.  Balance TPN and LR to equal 150 mL/hr.

## 2020-10-09 NOTE — CARE PLAN
Problem: Nutritional:  Goal: Nutrition support tolerated and meeting greater than 85% of estimated needs  Outcome: NOT MET   TPN to start per pharmacy

## 2020-10-09 NOTE — PROGRESS NOTES
10/8  Pt seen and examined, acute abdomen this evening.  She is having increased abdominal pain and some increased respiratory difficulty.  CT was done earlier today which showed dilated large bowel.  Was not done with rectal contrast as requested but distal rectum was decompressed.  Since that time condition worsened prompting re-imaging now with findings of free air in the abdomen.    Discussed findings with her and need for emergent surgery,  Suspect based on imaging this is a rupture from the right colon.  Also concerned for an anastomotic stricture or colon volvulus given poor progression over the last week.  Discussed need for repairs as indicated which may include placement of an ostomy again, which she understands.      She is NPO, NGT has been placed and she consents to the planned surgery at this time.

## 2020-10-09 NOTE — PROGRESS NOTES
Trauma / Surgical Daily Progress Note    Date of Service  10/9/2020    Chief Complaint  70 y.o. female admitted 9/29/2020 with PARASTOMAL HERNIA, COLOSTOMY IN PLACE    Interval Events  NG: minimal output  NPO for 10 days  Begin TPN.    Lovenox yes  Zosyn plan for 7 days total  Oliguria:  Fluid bolus.  Increase IVF  Severe ileus, NG in for now    Review of Systems  Review of Systems     Vital Signs for last 24 hours  Temp:  [36.3 °C (97.3 °F)-37.1 °C (98.7 °F)] 36.8 °C (98.3 °F)  Pulse:  [] 84  Resp:  [7-43] 13  BP: ()/() 107/63  SpO2:  [90 %-100 %] 97 %    Hemodynamic parameters for last 24 hours       Respiratory Data     Respiration: 13, Pulse Oximetry: 97 %        RUL Breath Sounds: Clear;Diminished, RML Breath Sounds: Clear;Diminished, RLL Breath Sounds: Diminished, DREW Breath Sounds: Clear;Diminished, LLL Breath Sounds: Diminished    Physical Exam  Physical Exam  HENT:      Head: Normocephalic.   Eyes:      General: No scleral icterus.  Cardiovascular:      Rate and Rhythm: Normal rate and regular rhythm.   Pulmonary:      Effort: No respiratory distress.      Breath sounds: No wheezing.   Abdominal:      Palpations: Abdomen is soft.      Tenderness: There is abdominal tenderness.   Musculoskeletal:         General: No tenderness.   Skin:     General: Skin is warm and dry.   Neurological:      General: No focal deficit present.      Mental Status: She is alert.   Psychiatric:         Mood and Affect: Mood normal.         Laboratory  Recent Results (from the past 24 hour(s))   CRP QUANTITIVE (NON-CARDIAC)    Collection Time: 10/08/20  2:23 PM   Result Value Ref Range    Stat C-Reactive Protein 12.85 (H) 0.00 - 0.75 mg/dL   CBC WITHOUT DIFFERENTIAL    Collection Time: 10/08/20  2:23 PM   Result Value Ref Range    WBC 19.2 (H) 4.8 - 10.8 K/uL    RBC 3.47 (L) 4.20 - 5.40 M/uL    Hemoglobin 11.4 (L) 12.0 - 16.0 g/dL    Hematocrit 31.9 (L) 37.0 - 47.0 %    MCV 91.9 81.4 - 97.8 fL    MCH 32.9 27.0 -  33.0 pg    MCHC 35.7 (H) 33.6 - 35.0 g/dL    RDW 42.6 35.9 - 50.0 fL    Platelet Count 393 164 - 446 K/uL    MPV 9.9 9.0 - 12.9 fL   Basic Metabolic Panel    Collection Time: 10/08/20  2:23 PM   Result Value Ref Range    Sodium 131 (L) 135 - 145 mmol/L    Potassium 3.8 3.6 - 5.5 mmol/L    Chloride 97 96 - 112 mmol/L    Co2 23 20 - 33 mmol/L    Glucose 132 (H) 65 - 99 mg/dL    Bun 10 8 - 22 mg/dL    Creatinine 0.35 (L) 0.50 - 1.40 mg/dL    Calcium 8.6 8.5 - 10.5 mg/dL    Anion Gap 11.0 7.0 - 16.0   ESTIMATED GFR    Collection Time: 10/08/20  2:23 PM   Result Value Ref Range    GFR If African American >60 >60 mL/min/1.73 m 2    GFR If Non African American >60 >60 mL/min/1.73 m 2   TROPONIN    Collection Time: 10/08/20  6:23 PM   Result Value Ref Range    Troponin T 10 6 - 19 ng/L   EKG    Collection Time: 10/08/20  6:26 PM   Result Value Ref Range    Report       [facility]    Test Date:  2020-10-08  Pt Name:    DAVID MONTANA                Department: [id number]  MRN:        [id number]                      Room:       [facility]  Gender:     Female                       Technician: [facility]  :        [id number]                   Requested By:LAUREN HAIRSTON  Order #:    [id number]                    Reading MD:    Measurements  Intervals                                Axis  Rate:       99                           P:          35  AR:         140                          QRS:        54  QRSD:       80                           T:          20  QT:         356  QTc:        457    Interpretive Statements  SINUS RHYTHM  CONSIDER RVH OR PMI W/ SEC REPOL ABNORMALITY  PROBABLE INFERIOR INFARCT, AGE INDETERMINATE  ARTIFACT IN LEAD(S) I,II,III,aVR,aVL,aVF,V1,V2,V3,V4,V5,V6  Compared to ECG 2020 07:56:49  Myocardial infarct finding now present     COVID/SARS CoV-2 PCR    Collection Time: 10/08/20  8:21 PM    Specimen: Nasopharyngeal; Respirate   Result Value Ref Range    COVID Order Status Received    SARS-CoV-2, PCR (In-House)     Collection Time: 10/08/20  8:21 PM   Result Value Ref Range    SARS-CoV-2 Source Nasal Swab     SARS-CoV-2 (RdRp gene) NotDetected    ACCU-CHEK GLUCOSE    Collection Time: 10/08/20  8:55 PM   Result Value Ref Range    Glucose - Accu-Ck 127 (H) 65 - 99 mg/dL   LACTIC ACID    Collection Time: 10/09/20  3:34 AM   Result Value Ref Range    Lactic Acid 0.9 0.5 - 2.0 mmol/L   CBC WITH DIFFERENTIAL    Collection Time: 10/09/20  3:34 AM   Result Value Ref Range    WBC 17.0 (H) 4.8 - 10.8 K/uL    RBC 3.31 (L) 4.20 - 5.40 M/uL    Hemoglobin 10.9 (L) 12.0 - 16.0 g/dL    Hematocrit 31.4 (L) 37.0 - 47.0 %    MCV 94.9 81.4 - 97.8 fL    MCH 32.9 27.0 - 33.0 pg    MCHC 34.7 33.6 - 35.0 g/dL    RDW 44.4 35.9 - 50.0 fL    Platelet Count 371 164 - 446 K/uL    MPV 9.0 9.0 - 12.9 fL    Neutrophils-Polys 92.10 (H) 44.00 - 72.00 %    Lymphocytes 2.90 (L) 22.00 - 41.00 %    Monocytes 3.90 0.00 - 13.40 %    Eosinophils 0.00 0.00 - 6.90 %    Basophils 0.50 0.00 - 1.80 %    Immature Granulocytes 0.60 0.00 - 0.90 %    Nucleated RBC 0.00 /100 WBC    Neutrophils (Absolute) 15.64 (H) 2.00 - 7.15 K/uL    Lymphs (Absolute) 0.50 (L) 1.00 - 4.80 K/uL    Monos (Absolute) 0.67 0.00 - 0.85 K/uL    Eos (Absolute) 0.00 0.00 - 0.51 K/uL    Baso (Absolute) 0.09 0.00 - 0.12 K/uL    Immature Granulocytes (abs) 0.10 0.00 - 0.11 K/uL    NRBC (Absolute) 0.00 K/uL   Comp Metabolic Panel    Collection Time: 10/09/20  3:34 AM   Result Value Ref Range    Sodium 131 (L) 135 - 145 mmol/L    Potassium 3.8 3.6 - 5.5 mmol/L    Chloride 99 96 - 112 mmol/L    Co2 26 20 - 33 mmol/L    Anion Gap 6.0 (L) 7.0 - 16.0    Glucose 141 (H) 65 - 99 mg/dL    Bun 10 8 - 22 mg/dL    Creatinine 0.47 (L) 0.50 - 1.40 mg/dL    Calcium 7.6 (L) 8.5 - 10.5 mg/dL    AST(SGOT) 15 12 - 45 U/L    ALT(SGPT) 16 2 - 50 U/L    Alkaline Phosphatase 49 30 - 99 U/L    Total Bilirubin 2.4 (H) 0.1 - 1.5 mg/dL    Albumin 2.1 (L) 3.2 - 4.9 g/dL    Total Protein 3.8 (L) 6.0 - 8.2 g/dL    Globulin 1.7 (L) 1.9  - 3.5 g/dL    A-G Ratio 1.2 g/dL   MAGNESIUM    Collection Time: 10/09/20  3:34 AM   Result Value Ref Range    Magnesium 1.5 1.5 - 2.5 mg/dL   PHOSPHORUS    Collection Time: 10/09/20  3:34 AM   Result Value Ref Range    Phosphorus 3.6 2.5 - 4.5 mg/dL   ESTIMATED GFR    Collection Time: 10/09/20  3:34 AM   Result Value Ref Range    GFR If African American >60 >60 mL/min/1.73 m 2    GFR If Non African American >60 >60 mL/min/1.73 m 2   Cholesterol    Collection Time: 10/09/20  3:34 AM   Result Value Ref Range    Cholesterol,Tot 118 100 - 199 mg/dL   Triglyceride    Collection Time: 10/09/20  3:34 AM   Result Value Ref Range    Triglycerides 94 0 - 149 mg/dL   Histology Request    Collection Time: 10/09/20  7:47 AM   Result Value Ref Range    Pathology Request Sent to Histo    ACCU-CHEK GLUCOSE    Collection Time: 10/09/20 11:49 AM   Result Value Ref Range    Glucose - Accu-Ck 131 (H) 65 - 99 mg/dL       Fluids    Intake/Output Summary (Last 24 hours) at 10/9/2020 1300  Last data filed at 10/9/2020 1200  Gross per 24 hour   Intake 5032.4 ml   Output 480 ml   Net 4552.4 ml       Core Measures & Quality Metrics  Labs reviewed, Medications reviewed and Radiology images reviewed        DVT Prophylaxis: Enoxaparin (Lovenox)  DVT prophylaxis - mechanical: SCDs          ISRAEL Score  ETOH Screening    Assessment/Plan  Colon perforation (HCC)  Assessment & Plan  9/29 Open Claudine's colostomy reversal, lysis of adhesions.  10/8 Free air / Ex lap with right colectomy with ileostomy for perforated cecum  Intraoperative cultures taken  Zosyn 2/7  Adebayo Boyce MD. Surgery     Multilobar lung infiltrate  Assessment & Plan  CXR:There is a right upper lobe opacity, consistent with pneumonia.  Check nasal swab for MRSA  On Zosyn  Serial CXR    No contraindication to deep vein thrombosis (DVT) prophylaxis- (present on admission)  Assessment & Plan  9/30 Pharmacological DVT prophylaxis initiated     Screening examination for infectious  disease- (present on admission)  Assessment & Plan  10/8 10/8 COVID-19 specimen sent. Negative.      Discussed patient condition with RN, RT, Pharmacy and Dietary.  CRITICAL CARE TIME EXCLUDING PROCEDURES: 35   Minutes.Decision making of high complexity.  I reviewed clinical labs, trends and orders for follow up.  Review of imaging,reports, consultant documentation .  Utilization of the information in todays decision making.   I evaluated the patient condition at bedside and discussed the daily plan(s) with available nursing staff,  pharmacists on rounds. Managing severe malnutrition and TPN, intravenous antibiotics, anticoagulation, fluid bolus for oliguria.

## 2020-10-09 NOTE — ANESTHESIA PROCEDURE NOTES
Airway    Date/Time: 10/8/2020 9:06 PM  Performed by: Sami Callaway M.D.  Authorized by: Sami Callaway M.D.     Location:  OR  Urgency:  Elective  Difficult Airway: No    Indications for Airway Management:  Anesthesia      Spontaneous Ventilation: absent    Sedation Level:  Deep  Preoxygenated: Yes    Patient Position:  Sniffing  Mask Difficulty Assessment:  0 - not attempted  Final Airway Type:  Endotracheal airway  Final Endotracheal Airway:  ETT  Cuffed: Yes    Technique Used for Successful ETT Placement:  Direct laryngoscopy  Devices/Methods Used in Placement:  Cricoid pressure    Insertion Site:  Oral  Blade Type:  Og  Laryngoscope Blade/Videolaryngoscope Blade Size:  3  ETT Size (mm):  7.0  Measured from:  Teeth  ETT to Teeth (cm):  21  Placement Verified by: auscultation and capnometry    Cormack-Lehane Classification:  Grade I - full view of glottis  Number of Attempts at Approach:  1  Number of Other Approaches Attempted:  0

## 2020-10-09 NOTE — ASSESSMENT & PLAN NOTE
9/29 Open Claudine's colostomy reversal, lysis of adhesions.  10/8 Free air / Ex lap with right colectomy with ileostomy for perforated cecum  Intraoperative cultures taken. Zosyn initiated.   10/9 TPN initiated  10/11 Zosyn 4/7;  Add fluconazole: cultures positive for yeast  10/15 Antibiotic stop date  Adebayo Boyce MD. Surgery

## 2020-10-09 NOTE — ANESTHESIA PROCEDURE NOTES
Central Venous Line  Performed by: Sami Callaway M.D.  Authorized by: Sami Callaway M.D.     Start Time:  10/8/2020 10:06 PM  End Time:  10/8/2020 10:08 PM  Patient Location:  OR  Indication: central venous access        provider hand hygiene performed prior to central venous catheter insertion, all 5 sterile barriers used (gloves, gown, cap, mask, large sterile drape) during central venous catheter insertion and skin prep agent completely dried prior to procedure    Patient Position:  Trendelenburg  Site:  Internal jugular  Prep:  Chlorhexidine  Catheter Size:  7 Fr  Number of Lumens:  Triple lumen  target vein identified, needle advanced into vein and blood aspirated and guidewire advanced into vein    Seldinger Technique?: Yes    Ultrasound-Guided: ultrasound-guided  Image captured, interpreted and electronically stored.  Sterile Gel and Probe Cover Used for Ultrasound?: Yes    Intravenous Verification: verified by ultrasound, venous blood return and chest x-ray pending    all ports aspirated, all ports flushed easily, guidewire was removed intact, biopatch was applied, line was sutured in place and dressing was applied    Events: patient tolerated procedure well with no complications    PA Catheter Placed?: No

## 2020-10-09 NOTE — PROGRESS NOTES
0010: Patient arrived w/ YVONNE RN on monitor. Patient resting comfortably. Pt transferred over to ICU monitor.     HR: 112  BP: 163/86  SpO2:99% on 6L oxymask  RR: 12  Temp:98.2F warm blankets applied, heat pack applied to hands  Wgt: 56.3Kg     2 RN Skin check performed w/Tiera  RN.     - Midline incision with prevena and Milton drains x3 to bulb suction   - LLQ ostomy stoma pink, and budding  - Sacrum intact, mepilex in place.

## 2020-10-09 NOTE — OR SURGEON
Immediate Post OP Note    PreOp Diagnosis: Pneumoperitoneum, Peritonitis    PostOp Diagnosis: same, Perforated Cecum    Procedure(s):  LAPAROTOMY, EXPLORATORY - Wound Class: Contaminated  Open Right Colectomy    Surgeon(s):  Kenn Boyce M.D.    Anesthesiologist/Type of Anesthesia:  Anesthesiologist: Sami Callaway M.D./General    Surgical Staff:  Circulator: Kathy Rashid R.N.; Lowell Guerrero R.N.  Scrub Person: Leyla Plummer; Margie Jarrett; Juancarlos Waddell R.N.    Specimens removed if any:  ID Type Source Tests Collected by Time Destination   1 : Peritoneal Fluid Body Fluid Peritoneal Fluid AFB CULTURE, FUNGAL CULTURE, AEROBIC/ANAEROBIC CULTURE (SURGERY) Kenn Boyce M.D. 10/8/2020  9:29 PM    A : Right Colon  Tissue Colon PATHOLOGY SPECIMEN Kenn Boyce M.D. 10/8/2020  9:55 PM        Estimated Blood Loss: 100cc    Findings: Dilated cecum with folding and transition point in the residual colon proximal to the anastomosis.  No anastomotic leak found, multiple areas of full thickness necrosis and perforation in the distended cecum.  Right colectomy completed with hemostasis.  Colorectal anastomosis taken down and end ileostomy placed with hemostasis    Complications: none        10/8/2020 10:56 PM Kenn Boyce M.D.

## 2020-10-09 NOTE — PROGRESS NOTES
1810: Patient up for shower, complaining of chest pain. Patient escorted back to bed; vitals taken. Patient hypertensive, increased work of breathing. Rapid Response called.  1825: Dr. Boyce paged to notify. CT results reviewed with MD. Order for NGT to LCWS placed.  1840: NGT placed. Oxygen saturation 69%, lips are blue. Patient placed on 6 L oxymask.  1845: Dr. Boyce called with verbal orders for STAT CT of abd/pelvis w/o contrast.  1850: CT called and are ready if RN can transport patient.  1900: Oxygen saturation 84% on 6 L. Increased oxygen via mask. Patient complains of being cold.  1920: Patient escorted x2 RN down to CT.  1935: Patient back in room. Patient still hypertensive, oxygen is 92% on 6 L oxymask and is tachycardic. Rapid RN called for support.  1940: CT results back.  1945: Paged Dr. Boyce.  1950: Orders for COVID swab and surgery.  1955: Patient updated.  2005: COVID sent to lab.  2010: Sister Flavia notified of surgery and results.  2020: Patient down to surgery with transport. CHG bath was completed.

## 2020-10-09 NOTE — DIETARY
"Nutrition Support Assessment   Day 10 of admit.  Tmaiko Cabrera is a 70 y.o. female with admitting DX of para-ileostomy hernia.      Current problem list:  1. Multilobar lung infiltrate   2. Colon perforation       Assessment:  Estimated Nutritional Needs: based on:   Height: 162.6 cm (5' 4\")  Weight: 56.3 kg (124 lb 1.9 oz)  Weight to use in Calculation: 53.3 kg via stand up scale (117 lb 15.1 oz)  Body Mass Index: 20.25 BMI classification: Normal     Calculation/Equation: MSJ x 1.2 = 1247 kcal/day  Calories/day: 1250 - 1450 (23 - 27)  Grams of Protein/day: 64 - 75 (1.2  - 1.4 gm/kg)     Evaluation:   1. Indication for nutrition support: pt NPO/clear liquids x 10 days. Unable to advance diet, TPN indicated at this time.   2. Admitted for Katz's colostomy reversal on 9/29. Followed by 10/8: exploratory lap, contaminated open right colectomy.   3. Refeeding labs WNL (K, phos, mg) Na is 131, receiving LR.   4. CRP yesterday was 12.85, trending up from 1.48 on 10/7. Increase in inflammation likely related to surgical procedure.   5. TPN per pharmacy is appropriate to meet this pt's protein and energy requirements.     Malnutrition Risk: NA     Recommendations/Plan:  1. TPN per pharmacy with nutrition recommendations.   2. Fluids per MD.   3. Advance diet as appropriate.     RD following.                   "

## 2020-10-09 NOTE — WOUND TEAM
"[facility]  Inpatient Services  New Ostomy Management & Teaching    HPI:  Reviewed   PMH: Reviewed   SH: Reviewed    Subjective: \"I can have my sister bring in my appliances from home, I can't remember the brand, but they are pre-cut at 5/8th's     Objective: appliance intact, overlapping midline Prevena       Ileostomy 10/08/20 Standard (Megan, end) (Active)   Wound Image      Stomal Appliance Assessment Changed    Stoma Assessment Pink    Stoma Shape Budded Greater Than One Inch    Stoma Size (in) 1.25    Peristomal Assessment Clean;Dry;small opening distal to stoma, likely old NOEL site    Mucocutaneous Junction Intact    Treatment Appliance Changed    Peristomal Protectant No Sting Skin Prep;Hydrocolloid;Paste Ring;plain aquacel    Stomal Appliance Paste Ring, 2\";2 1/4\" (57mm) CTF    Output (mL) 25 mL    Output Color Green    WOUND RN ONLY - Stomal Appliance  2 Piece;2 1/4\" (57mm) CTF;Paste Ring, 2\"    Appliance (Pouch) # [id number]    Appliance Brand [email or web address]    Appliance Supplier [email or web address]    Secure Start completed Yes    Ostomy Care Resources Provided [facility] Tip Sheet            Assessment:   Interventions: Patient observed all steps; appliance carefully removed, had to cut away some drape from prevena. Cleansed stoma and skin with moist wash cloth, dried. Applied no sting to crystal stomal skin and added more drape to get seal on Prevena. Placed small strip of plain Aquacel into FTW distal stoma, covered with piece of thin hydrocolloid. Made template, cut barrier to fit, removed backing and applied SC to barrier opening. Applied barrier to skin and snapped on pouch, closed end.     Pt education: Questions and concerns addressed, patient currently on PCA for pain control, difficult to be hands on today. SS completed, all educational handouts left at bedside. Educated patient that an ileostomy is different from a colostomy and patient will need to consume more fluids and change appliance more frequently. " Patient had a colostomy for the last 4 years, takedown was performed on 9/29/20, but had to be taken back to OR on 10/8 due to perforated cecum and placement of end ileo. Patient should be able to perform hands on next change, will need reinforcement of ileostomy care and considerations with food/fluids and medications. Patient will need to be shown crusting if she hasn't already performed this herself. Ordered high output pouches and stoma powder for next appliance change.    Plan: Wound team to continue to follow for ostomy needs and teaching     Anticipated discharge needs: Supplies, supplier information, possible HH or outpatient ostomy clinic

## 2020-10-09 NOTE — PROGRESS NOTES
10/9  Pt seen and examined, in surgical ICU, requiring IVF, supportive care.  She actually feels better this morning, much less pain then before.  She is sitting up in a chair at present.  Ileostomy not yet functioning.      Abdomen soft, appr tender, nd  Provena c/d/i drains in place with serosanguinous output  NGT to suction    Planning on starting TPN later  On IV Abx, cultures pending  Wound care consult to help with ostomy care  Ongoing care per ICU team for now

## 2020-10-09 NOTE — DISCHARGE PLANNING
Anticipated Discharge Disposition: TBD    Action: Pt new to unit and LSW. Discussed in IDT rounds. No case management or d/c planning needs at this time.     Barriers to Discharge: Medical clearance, unknown dc needs     Plan: Continue to follow & assist with social/dc needs

## 2020-10-09 NOTE — ASSESSMENT & PLAN NOTE
CXR:There is a right upper lobe opacity, consistent with pneumonia.  10/8 Zosyn  10/9 CXR: Stable right-sided opacities, consistent with pneumonia and atelectasis.   Trend CXR.  Aggressive pulmonary hygiene.

## 2020-10-09 NOTE — CARE PLAN
Problem: Pain Management  Goal: Pain level will decrease to patient's comfort goal  Outcome: PROGRESSING AS EXPECTED  Note: Morphine PCA in place. Pt using bolus button as needed. Pain well controlled at this time.      Problem: Urinary Elimination:  Goal: Ability to reestablish a normal urinary elimination pattern will improve  Outcome: PROGRESSING AS EXPECTED  Note: POD#1. Geiger in place. Minimal UOP. Bolus ordered and infusion rate increased.

## 2020-10-09 NOTE — PROGRESS NOTES
"Pharmacy TPN Day # 1       10/9/2020    Dosing Weight  55 kg (Actual body weight on )        TPN currently providing 50% of goal      TPN goal: 25 -30 kcal/kg/day including 1.5 -1.8 gm/kg/day Protein      TPN indication: Bowel resection      Pertinent PMH: 70 year-old female with history of transverse and left colectomy for ischemia, admitted on  for open Katz's colostomy reversal. Patient returned to the OR on 10/8 for an emergent colorectal anastomosis taken down and end ileostomy for perorated cecum. Due to concern for GI integrity and expected prolonged period time without adequate enteral nutrition, TPN is initiated.     Temp (24hrs), Av.8 °C (98.2 °F), Min:36.3 °C (97.3 °F), Max:37.2 °C (99 °F)  .  Recent Labs     10/07/20  0208 10/08/20  1423 10/09/20  0334   SODIUM 138 131* 131*   POTASSIUM 3.4* 3.8 3.8   CHLORIDE 102 97 99   CO2 25 23 26   BUN 19 10 10   CREATININE 0.57 0.35* 0.47*   GLUCOSE 120* 132* 141*   CALCIUM 8.8 8.6 7.6*   ASTSGOT  --   --  15   ALTSGPT  --   --  16   ALBUMIN  --   --  2.1*   TBILIRUBIN  --   --  2.4*   PHOSPHORUS  --   --  3.6   MAGNESIUM  --   --  1.5     Accu-Checks  Recent Labs     10/08/20  2055   POCGLUCOSE 127*       Vitals:    10/09/20 0700 10/09/20 0800 10/09/20 0900 10/09/20 1000   BP: (!) 97/55 103/55 135/70 139/68   Weight:       Height:    1.626 m (5' 4.02\")       Intake/Output Summary (Last 24 hours) at 10/9/2020 1206  Last data filed at 10/9/2020 1000  Gross per 24 hour   Intake 4760.32 ml   Output 430 ml   Net 4330.32 ml       Orders Placed This Encounter   Procedures   • Diet NPO     Standing Status:   Standing     Number of Occurrences:   1     Order Specific Question:   Restrict to:     Answer:   Ice Chips [2]         TPN for past 72 hours (Show up to 3 orders; newest on the left.)     Start date and time   10/09/2020 2000      TPN Central Line Formulation [[id number]]    Order Status  Active       Base    Clinisol 15%  40 g    dextrose  125 g    fat " emulsions 20%  10 g       Additives    potassium phosphate  15 mmol    potassium chloride  20 mEq    sodium acetate  154 mEq    sodium chloride  154 mEq    magnesium sulfate  16 mEq    calcium GLUConate  4.65 mEq    M.T.E. -5 Adult  1 mL    M.V.I. Adult  10 mL    famotidine  40 mg    thiamine  100 mg    folic acid  1 mg       QS Base    sterile water for inj(pf)  1,334.74 mL       Energy Contribution    Proteins  --    Dextrose  --    Lipids  --    Total  --       Electrolyte Ion Calculated Amount    Sodium  308 mEq    Potassium  42 mEq    Calcium  4.65 mEq    Magnesium  16 mEq    Aluminum  --    Phosphate  10 mmol    Chloride  182 mEq    Acetate  187.87 mEq       Other    Total Protein  40 g    Total Protein/kg  0.71 g/kg    Total Amino Acid  --    Total Amino Acid/kg  --    Glucose Infusion Rate  1.54 mg/kg/min    Osmolarity (Estimated)  --    Volume  1,992 mL    Rate  83 mL/hr    Dosing Weight  56.3 kg    Infusion Site  [facility]          This formula provides:  % kcal as lipids =  ~15  Grams protein/kg = 0.72  Non-protein calories = 525 kcal  Kcals/kg = 12.4  Total daily calories = 685 kcal    Comments:  1. New start on TPN. Will start with 50% caloric needs given risk of refeeding syndrome with inadequate intake for the last 10 days.  2. Macronutrients:   -Dextrose: no hx of DM. BG controlled, < 180 mg/dL. Will start with 125 grams of dextrose. No SSI needed at this point.    -Protein: given extensive surgery, will aim for 1.5 gram/kg/day of protein once TPN is running at full rate. BUN/SCr wnl and stable.    -Fat: on abx for peritonitis, perforated cecum. Not on pressors. Will start with 10 gram of IVFE, which is ~ 15% of total calories.   3. Micronutrients: at risk of refeeding syndrome   - Na: 131, been on LR as IVMF. Will provide 154 mEq/L of Na in TPN with 1:1 Acetate to Chloride.   - K: 3.8, received 20 of KCL this AM. Will aim for a lower K at 42 mEq in TPN given poor UOP. Will start a K-scale to prevent  electrolyte shift with refeeding.   - Phos: 3.6. Will start with 15 mmol of Phos.   - M.5, received 4 grams of MgSO4. Mg levels have been on the lower end of normal limit for the past couple of days. Will start with 16 mEq of Mg.    - Ca: ionized Ca ordered.  Will start with 4.65 mEq of Ca.   4. Fluids: poor UOP overnight, LR increased to 150 cc/hr, received X 1 LR bolus. TPN will be running at 83 cc/hr.  5. Others: TPN contains famotidine, MTV, MTE-5, folic acid and thiamine (day )       Sima Luciano, PharmD

## 2020-10-09 NOTE — ANESTHESIA PREPROCEDURE EVALUATION
Relevant Problems   CARDIAC   (+) Chronic migraine      GI   (+) Gastroesophageal reflux disease without esophagitis   ruptured viscus, free air noted on CT abdomen    Physical Exam    Airway   Mallampati: II  TM distance: >3 FB  Neck ROM: full       Cardiovascular - normal exam  Rhythm: regular  Rate: normal  (-) murmur     Dental       Very poor dentition   Pulmonary - normal exam  Breath sounds clear to auscultation     Abdominal    Neurological - normal exam                 Anesthesia Plan    ASA 3- EMERGENT   ASA physical status 3 criteria: hypertension - poorly controlledASA physical status emergent criteria: acute peritonitis    Plan - general       Airway plan will be ETT        Induction: intravenous    Postoperative Plan: Postoperative administration of opioids is intended.    Pertinent diagnostic labs and testing reviewed    Informed Consent:  Emergent - Consent given by clinician  Anesthetic plan and risks discussed with patient.    Use of blood products discussed with: patient whom consented to blood products.

## 2020-10-10 ENCOUNTER — APPOINTMENT (OUTPATIENT)
Dept: RADIOLOGY | Facility: MEDICAL CENTER | Age: 70
DRG: 329 | End: 2020-10-10
Attending: SURGERY
Payer: MEDICARE

## 2020-10-10 LAB
ALBUMIN SERPL BCP-MCNC: 2.2 G/DL (ref 3.2–4.9)
ALBUMIN/GLOB SERPL: 1 G/DL
ALP SERPL-CCNC: 59 U/L (ref 30–99)
ALT SERPL-CCNC: 18 U/L (ref 2–50)
ANION GAP SERPL CALC-SCNC: 7 MMOL/L (ref 7–16)
AST SERPL-CCNC: 16 U/L (ref 12–45)
BASOPHILS # BLD AUTO: 0.3 % (ref 0–1.8)
BASOPHILS # BLD: 0.05 K/UL (ref 0–0.12)
BILIRUB SERPL-MCNC: 1.7 MG/DL (ref 0.1–1.5)
BUN SERPL-MCNC: 12 MG/DL (ref 8–22)
CA-I SERPL-SCNC: 1.1 MMOL/L (ref 1.1–1.3)
CALCIUM SERPL-MCNC: 8.1 MG/DL (ref 8.5–10.5)
CHLORIDE SERPL-SCNC: 101 MMOL/L (ref 96–112)
CO2 SERPL-SCNC: 27 MMOL/L (ref 20–33)
CREAT SERPL-MCNC: 0.44 MG/DL (ref 0.5–1.4)
EOSINOPHIL # BLD AUTO: 0.12 K/UL (ref 0–0.51)
EOSINOPHIL NFR BLD: 0.8 % (ref 0–6.9)
ERYTHROCYTE [DISTWIDTH] IN BLOOD BY AUTOMATED COUNT: 46.8 FL (ref 35.9–50)
GLOBULIN SER CALC-MCNC: 2.3 G/DL (ref 1.9–3.5)
GLUCOSE SERPL-MCNC: 149 MG/DL (ref 65–99)
HCT VFR BLD AUTO: 28.7 % (ref 37–47)
HGB BLD-MCNC: 9.5 G/DL (ref 12–16)
IMM GRANULOCYTES # BLD AUTO: 0.15 K/UL (ref 0–0.11)
IMM GRANULOCYTES NFR BLD AUTO: 1 % (ref 0–0.9)
LYMPHOCYTES # BLD AUTO: 0.94 K/UL (ref 1–4.8)
LYMPHOCYTES NFR BLD: 6.2 % (ref 22–41)
MAGNESIUM SERPL-MCNC: 2.3 MG/DL (ref 1.5–2.5)
MCH RBC QN AUTO: 32.2 PG (ref 27–33)
MCHC RBC AUTO-ENTMCNC: 33.1 G/DL (ref 33.6–35)
MCV RBC AUTO: 97.3 FL (ref 81.4–97.8)
MONOCYTES # BLD AUTO: 1.09 K/UL (ref 0–0.85)
MONOCYTES NFR BLD AUTO: 7.1 % (ref 0–13.4)
NEUTROPHILS # BLD AUTO: 12.93 K/UL (ref 2–7.15)
NEUTROPHILS NFR BLD: 84.6 % (ref 44–72)
NRBC # BLD AUTO: 0 K/UL
NRBC BLD-RTO: 0 /100 WBC
PHOSPHATE SERPL-MCNC: 2.8 MG/DL (ref 2.5–4.5)
PLATELET # BLD AUTO: 407 K/UL (ref 164–446)
PMV BLD AUTO: 9.1 FL (ref 9–12.9)
POTASSIUM SERPL-SCNC: 3.6 MMOL/L (ref 3.6–5.5)
POTASSIUM SERPL-SCNC: 3.8 MMOL/L (ref 3.6–5.5)
PROT SERPL-MCNC: 4.5 G/DL (ref 6–8.2)
RBC # BLD AUTO: 2.95 M/UL (ref 4.2–5.4)
SODIUM SERPL-SCNC: 135 MMOL/L (ref 135–145)
WBC # BLD AUTO: 15.3 K/UL (ref 4.8–10.8)

## 2020-10-10 PROCEDURE — 700105 HCHG RX REV CODE 258: Performed by: SURGERY

## 2020-10-10 PROCEDURE — 80053 COMPREHEN METABOLIC PANEL: CPT

## 2020-10-10 PROCEDURE — 84100 ASSAY OF PHOSPHORUS: CPT

## 2020-10-10 PROCEDURE — 84132 ASSAY OF SERUM POTASSIUM: CPT

## 2020-10-10 PROCEDURE — 85025 COMPLETE CBC W/AUTO DIFF WBC: CPT

## 2020-10-10 PROCEDURE — 700101 HCHG RX REV CODE 250: Performed by: SURGERY

## 2020-10-10 PROCEDURE — 82330 ASSAY OF CALCIUM: CPT

## 2020-10-10 PROCEDURE — 700102 HCHG RX REV CODE 250 W/ 637 OVERRIDE(OP): Performed by: SURGERY

## 2020-10-10 PROCEDURE — 71045 X-RAY EXAM CHEST 1 VIEW: CPT

## 2020-10-10 PROCEDURE — 700111 HCHG RX REV CODE 636 W/ 250 OVERRIDE (IP): Performed by: SURGERY

## 2020-10-10 PROCEDURE — 770022 HCHG ROOM/CARE - ICU (200)

## 2020-10-10 PROCEDURE — 83735 ASSAY OF MAGNESIUM: CPT

## 2020-10-10 PROCEDURE — A9270 NON-COVERED ITEM OR SERVICE: HCPCS | Performed by: SURGERY

## 2020-10-10 PROCEDURE — 99291 CRITICAL CARE FIRST HOUR: CPT | Performed by: SURGERY

## 2020-10-10 RX ORDER — POTASSIUM CHLORIDE 14.9 MG/ML
20 INJECTION INTRAVENOUS ONCE
Status: COMPLETED | OUTPATIENT
Start: 2020-10-10 | End: 2020-10-10

## 2020-10-10 RX ORDER — POTASSIUM CHLORIDE 7.45 MG/ML
10 INJECTION INTRAVENOUS ONCE
Status: COMPLETED | OUTPATIENT
Start: 2020-10-10 | End: 2020-10-10

## 2020-10-10 RX ADMIN — ENOXAPARIN SODIUM 40 MG: 40 INJECTION SUBCUTANEOUS at 06:29

## 2020-10-10 RX ADMIN — ONDANSETRON 4 MG: 2 INJECTION INTRAMUSCULAR; INTRAVENOUS at 02:56

## 2020-10-10 RX ADMIN — PIPERACILLIN AND TAZOBACTAM 3.38 G: 3; .375 INJECTION, POWDER, LYOPHILIZED, FOR SOLUTION INTRAVENOUS; PARENTERAL at 21:22

## 2020-10-10 RX ADMIN — CALCIUM GLUCONATE: 98 INJECTION, SOLUTION INTRAVENOUS at 00:24

## 2020-10-10 RX ADMIN — PILOCARPINE HYDROCHLORIDE 5 MG: 5 TABLET, FILM COATED ORAL at 06:31

## 2020-10-10 RX ADMIN — POTASSIUM CHLORIDE 20 MEQ: 14.9 INJECTION, SOLUTION INTRAVENOUS at 08:11

## 2020-10-10 RX ADMIN — DORZOLAMIDE HYDROCHLORIDE 1 DROP: 20 SOLUTION/ DROPS OPHTHALMIC at 12:32

## 2020-10-10 RX ADMIN — Medication: at 10:04

## 2020-10-10 RX ADMIN — DULOXETINE HYDROCHLORIDE 120 MG: 60 CAPSULE, DELAYED RELEASE ORAL at 06:30

## 2020-10-10 RX ADMIN — CALCIUM GLUCONATE: 98 INJECTION, SOLUTION INTRAVENOUS at 21:10

## 2020-10-10 RX ADMIN — IPRATROPIUM BROMIDE 2 SPRAY: 21 SPRAY NASAL at 18:18

## 2020-10-10 RX ADMIN — PIPERACILLIN AND TAZOBACTAM 3.38 G: 3; .375 INJECTION, POWDER, LYOPHILIZED, FOR SOLUTION INTRAVENOUS; PARENTERAL at 06:29

## 2020-10-10 RX ADMIN — KETOROLAC TROMETHAMINE 15 MG: 30 INJECTION, SOLUTION INTRAMUSCULAR at 00:13

## 2020-10-10 RX ADMIN — TRAZODONE HYDROCHLORIDE 150 MG: 100 TABLET ORAL at 21:26

## 2020-10-10 RX ADMIN — PILOCARPINE HYDROCHLORIDE 5 MG: 5 TABLET, FILM COATED ORAL at 18:18

## 2020-10-10 RX ADMIN — KETOROLAC TROMETHAMINE 15 MG: 30 INJECTION, SOLUTION INTRAMUSCULAR at 06:30

## 2020-10-10 RX ADMIN — DORZOLAMIDE HYDROCHLORIDE 1 DROP: 20 SOLUTION/ DROPS OPHTHALMIC at 18:17

## 2020-10-10 RX ADMIN — KETOROLAC TROMETHAMINE 15 MG: 30 INJECTION, SOLUTION INTRAMUSCULAR at 11:21

## 2020-10-10 RX ADMIN — PILOCARPINE HYDROCHLORIDE 5 MG: 5 TABLET, FILM COATED ORAL at 12:31

## 2020-10-10 RX ADMIN — TIMOLOL MALEATE 1 DROP: 5 SOLUTION/ DROPS OPHTHALMIC at 18:17

## 2020-10-10 RX ADMIN — IPRATROPIUM BROMIDE 2 SPRAY: 21 SPRAY NASAL at 06:29

## 2020-10-10 RX ADMIN — DORZOLAMIDE HYDROCHLORIDE 1 DROP: 20 SOLUTION/ DROPS OPHTHALMIC at 06:29

## 2020-10-10 RX ADMIN — KETOROLAC TROMETHAMINE 15 MG: 30 INJECTION, SOLUTION INTRAMUSCULAR at 18:18

## 2020-10-10 RX ADMIN — PIPERACILLIN AND TAZOBACTAM 3.38 G: 3; .375 INJECTION, POWDER, LYOPHILIZED, FOR SOLUTION INTRAVENOUS; PARENTERAL at 12:32

## 2020-10-10 RX ADMIN — TIMOLOL MALEATE 1 DROP: 5 SOLUTION/ DROPS OPHTHALMIC at 06:30

## 2020-10-10 RX ADMIN — POTASSIUM CHLORIDE 10 MEQ: 7.46 INJECTION, SOLUTION INTRAVENOUS at 01:47

## 2020-10-10 RX ADMIN — LATANOPROST 1 DROP: 50 SOLUTION OPHTHALMIC at 21:22

## 2020-10-10 RX ADMIN — SODIUM CHLORIDE, POTASSIUM CHLORIDE, SODIUM LACTATE AND CALCIUM CHLORIDE: 600; 310; 30; 20 INJECTION, SOLUTION INTRAVENOUS at 00:17

## 2020-10-10 RX ADMIN — Medication: at 01:38

## 2020-10-10 ASSESSMENT — PAIN DESCRIPTION - PAIN TYPE
TYPE: ACUTE PAIN
TYPE: ACUTE PAIN;SURGICAL PAIN
TYPE: ACUTE PAIN
TYPE: ACUTE PAIN
TYPE: ACUTE PAIN;SURGICAL PAIN

## 2020-10-10 ASSESSMENT — FIBROSIS 4 INDEX: FIB4 SCORE: 0.71

## 2020-10-10 NOTE — CARE PLAN
Problem: Communication  Goal: The ability to communicate needs accurately and effectively will improve  Outcome: PROGRESSING AS EXPECTED  Note: Patient educated on plan and goals of care and disease process. Education provided on medications, procedures, and equipment. Will continue to re-inforce education when required. All questions and concerns answered at this time. Call light within reach.      Problem: Infection  Goal: Will remain free from infection  Outcome: PROGRESSING AS EXPECTED  Note: Assessing for signs and symptoms of infection, standard precautions in place, antibiotics administered per MAR

## 2020-10-10 NOTE — CARE PLAN
Problem: Pain Management  Goal: Pain level will decrease to patient's comfort goal  Outcome: PROGRESSING SLOWER THAN EXPECTED  Intervention: Follow pain managment plan developed in collaboration with patient and Interdisciplinary Team  Note: Patient currently on morphine PCA but reporting hallucinations with medication. Discussed with Interdisciplinary team, transition to fentanyl PCA to address hallucinations.     Problem: Venous Thromboembolism (VTW)/Deep Vein Thrombosis (DVT) Prevention:  Goal: Patient will participate in Venous Thrombosis (VTE)/Deep Vein Thrombosis (DVT)Prevention Measures  Outcome: PROGRESSING SLOWER THAN EXPECTED  Intervention: Ensure patient wears graduated elastic stockings (LIZ hose) and/or SCDs, if ordered, when in bed or chair (Remove at least once per shift for skin check)  Note: Patient receiving Lovenox, has SCDs in place and mobilizes frequently

## 2020-10-10 NOTE — PROGRESS NOTES
Pharmacy TPN Day # 2       10/10/2020    Dosing Weight   55 kg           TPN currently providing 50% of goal - advancing to 100% with today's formulation      TPN goal: 1419-0700 kcal/day including 1.2-1.6 gm/kg/day Protein      TPN indication: Bowel resection    Pertinent PMH: Patient with history of transverse and left colectomy for ischemia admitted on  for open Katz's colostomy reversal. Patient returned to the OR on 10/8 for an emergent colorectal anastomosis taken down and end ileostomy for perforated cecum. Parenteral nutrition initiated 10/9 due to concern for GI integrity and expected prolonged period time without adequate enteral nutrition.    Temp (24hrs), Av.8 °C (98.3 °F), Min:36.2 °C (97.1 °F), Max:37.1 °C (98.8 °F)    Recent Labs     10/08/20  1423 10/09/20  0334 10/10/20  0020 10/10/20  0620   SODIUM 131* 131*  --  135   POTASSIUM 3.8 3.8 3.8 3.6   CHLORIDE 97 99  --  101   CO2 23 26  --  27   BUN 10 10  --  12   CREATININE 0.35* 0.47*  --  0.44*   GLUCOSE 132* 141*  --  149*   CALCIUM 8.6 7.6*  --  8.1*   ASTSGOT  --  15  --  16   ALTSGPT  --  16  --  18   ALBUMIN  --  2.1*  --  2.2*   TBILIRUBIN  --  2.4*  --  1.7*   PHOSPHORUS  --  3.6 2.8  --    MAGNESIUM  --  1.5 2.3  --      Accu-Checks  Recent Labs     10/08/20  2055 10/09/20  1149   POCGLUCOSE 127* 131*       Vitals:    10/10/20 0700 10/10/20 0800 10/10/20 0900 10/10/20 1000   BP: (!) 169/81 154/74 135/72 128/70   Weight:       Height:           Intake/Output Summary (Last 24 hours) at 10/10/2020 1159  Last data filed at 10/10/2020 1000  Gross per 24 hour   Intake 3978.77 ml   Output 1730 ml   Net 2248.77 ml       Orders Placed This Encounter   Procedures   • Diet NPO     Standing Status:   Standing     Number of Occurrences:   1     Order Specific Question:   Restrict to:     Answer:   Ice Chips [2]         TPN for past 72 hours (Show up to 3 orders; newest on the left. Changes between the two most recent orders are indicated.)      Start date and time   10/10/2020 2000 10/09/2020 2000      TPN Central Line Formulation [[id number]] TPN Central Line Formulation [[id number]]    Order Status  Active Last Dose in Progress    Last Admin   New Bag at 10/10/2020 0024 by Kathy Islas R.N.       Base    Clinisol 15%  80 g 40 g    dextrose  185 g 125 g    fat emulsions 20%  35 g 10 g       Additives    potassium phosphate  15 mmol 15 mmol    potassium chloride  40 mEq 20 mEq    sodium acetate  154 mEq 154 mEq    sodium chloride  154 mEq 154 mEq    magnesium sulfate  12 mEq 16 mEq    calcium GLUConate  4.65 mEq 4.65 mEq    M.T.E. -5 Adult  1 mL 1 mL    M.V.I. Adult  10 mL 10 mL    famotidine  40 mg 40 mg    thiamine  -- 100 mg    folic acid  1 mg 1 mg       QS Base    sterile water for inj(pf)  850.71 mL 1,336.09 mL       Electrolyte Ion Calculated Amount    Sodium  308 mEq 308 mEq    Potassium  62 mEq 42 mEq    Calcium  4.65 mEq 4.65 mEq    Magnesium  12.02 mEq 16 mEq    Aluminum  -- --    Phosphate  15 mmol 15 mmol    Chloride  194 mEq 174 mEq    Acetate  221.73 mEq 187.87 mEq       Other    Total Protein  80 g 40 g    Total Protein/kg  1.37 g/kg 0.71 g/kg    Total Amino Acid  -- --    Total Amino Acid/kg  -- --    Glucose Infusion Rate  2.2 mg/kg/min 1.54 mg/kg/min    Osmolarity (Estimated)  -- --    Volume  1,992 mL 1,992 mL    Rate  83 mL/hr 83 mL/hr    Dosing Weight  58.5 kg 56.3 kg    Infusion Site  [facility]          This formula provides:  % kcal as lipids = 27  Grams protein/kg = 1.45  Non-protein calories = 979  Kcals/kg = 24  Total daily calories = 1299    Comments:  1. Patient appears to have tolerated initiation of TPN last evening as evidenced by stable labs. Patient's output from NG tube decreasing and NG tube to be pulled today per surgeon's request. Patient still having limited output from ileostomy - patient to remain NPO until output increases.   2. Micronutrients/Electrolytes: Patient's hyponatremia resolved with TPN  formulated at 0.9% NS equivalent; total IVF + TPN rate reduced to 125 mL/hr per MD request. Patient only required 30 mEq potassium replacement per K+ scale - will increase potassium in next TPN formulation and discontinue K+ scale. Mag and phos also stable on AM labs, magnesium supplementation decreased based on recent requirements.   3. Macronutrients/glycemic control: Macronutrients to advance to goal kcal as patient had minimal signs of refeeding noted. All BG <180 mg/dL and no insulin required.   4. Famotidine remains in TPN for SUP pending restart of enteral nutrition. Folate supplementation continues in TPN, thiamine removed per P&T protocol d/t nationwide shortage of IV product.   5. Will advance TPN to 100% of estimated nutritional requirements and continue to follow for changes in enteral nutrition status for changes as appropriate.    Pharmacy will continue to follow.     Collette White, PharmD, BCCCP

## 2020-10-10 NOTE — PROGRESS NOTES
Page to Dr. Boyce about NPO status -- patient okay to have NGT clamped x 30 minutes to take medications or to ambulate.   Spoke with Dr. Oleary about UOP. Maintaining ~ 25 mL/hr post bolus. Orders received for LR bolus 250 mL for UOP < 20. May repeat x 6.

## 2020-10-10 NOTE — PROGRESS NOTES
2 RN skin check completed with Alice    Skin assessed under the following devices: BP cuff, SCDs, SpO2 probe     - Midline incision with prevena and Milton drains x3 to bulb suction.   -RLQ ostomy take down site. Drsg changed   - LLQ ostomy stoma pink. Appliance intact.  - Sacrum intact, mepilex in place.    Interventions: q2h turns, mobility as tolerated

## 2020-10-10 NOTE — OP REPORT
DATE OF SERVICE:  10/08/2020    PREOPERATIVE DIAGNOSIS:  Perforated viscus.    POSTOPERATIVE DIAGNOSIS:  Perforated cecum.    PROCEDURES:  1.  Exploratory laparotomy.  2.  Open right colectomy with ileostomy placement.    SURGEON:  Kenn Boyce MD    ASSISTANT:  None.    ANESTHESIA:  General endotracheal anesthesia.    ANESTHESIOLOGIST:  Sami Callaway MD    ESTIMATED BLOOD LOSS:  100 mL.    SPECIMENS:  1.  Peritoneal fluid culture.  2.  Right colon.    WOUND CLASS:  4.    COMPLICATIONS:  None.    CONDITION:  Guarded to the ICU.    INDICATIONS FOR PROCEDURE:  This is a 70-year-old female in the hospital 9   days after an open Claudine colostomy reversal.  She had an anastomosis from   the residual right colon to the rectum completed with hemostasis.  She has had   a postoperative ileus with distention of the cecum.  CT was obtained earlier   today demonstrating no acute process, but a distended cecum.  She then became   acutely worsen.  Repeat imaging demonstrated pneumoperitoneum and she had   developed peritonitis.  Patient was taken emergently to the OR for further   treatment.    OPERATIVE FINDINGS:  Perforated cecum with distention and multiple small   intramural defects from distention necrosis, no anastomotic leak, viable   colon, transition point from distended cecum to normal bowel in the mid   abdomen secondary to a rotated colon wall.  Right colon removed, end-ileostomy   placed with hemostasis, multiple drains within the abdomen.    OPERATIVE TECHNIQUE:  After informed consent was obtained, the patient was   taken to the operating room, placed in supine position.  After adequate   endotracheal anesthesia was achieved, the abdomen was prepped and draped in   sterile fashion.  Operation was begun by reopening the laparotomy wound from   her previous surgery, removing all suture material from the fascia, and then   entering the abdomen bluntly through the peritoneal defect.  We identified    purulent fluid in the abdomen immediately and this was cultured and suctioned   free.  We noted immediately a large distended cecum.  There were several areas   of necrosis from distention in the cecal wall.  We were able to suction and   decompress gas and stool through these defects.  We then placed   figure-of-eight 3-0 Vicryl sutures to minimize stool contamination from the   case.  With this accomplished, we enlarged the incision and secured a   Bookwalter retractor in place and suctioned all contaminated fluid out of the   abdomen.    Next, we assessed the surgical anatomy.  There did not appear to be   anastomotic leak or ischemia of the bowel.  What appeared to have happened was   that a highly mobile right colon.  Cecum had rotated on itself over the top   of the more distal colon creating a pseudoobstruction or cecal bascule type   defect.  This appeared to be the only abnormality present, but due to the   distended state of the right colon, it became apparent that a right colectomy   was going to be necessary and that a redo pelvic anastomosis in this case was   not going to be safe.  Therefore, we made the decision to remove the right   colon, create a new end-ileostomy, and sew off the rectal stump, recreating a   Claudine pouch.    This was accomplished by first dividing the colorectal anastomosis.  We did   this sharply with scissors at the staple line.  Once this was accomplished, we   clamped off the cut end of the colon and then grasped the open end of the   rectum.  We then just distal to this fired a contour green load stapler   closing it.  We oversewed the staple line with a running 2-0 Prolene, which   was left in the pelvis with long tails and hemostasis was noted from the   rectal stump at that point.    Next, we divided the terminal ileum just above the ileocecal valve using   through mesenteric windows using a DIANA-75 staple fire.  We now mobilized the   lateral attachments of the right  colon mesentery.  Once it was rotated   medially, we divided the right colon mesentery with a LigaSure device.  A   clamp and 0 Vicryl tie was used on the ileocolic pedicle as well.  With this   done, the right colon was removed from the abdomen and hemostasis was noted in   the operative field.    Next, we irrigated the abdomen with 6 liters of warm saline, removing all   contamination and hemostasis was noted.  We now made a circular incision in   the left lower quadrant and removed the core of fatty tissue down to the   fascia, which was incised in a cruciate fashion.  Rectus muscles were spread   apart and the end of the bowel was extracted from the abdomen for maturation   as an end ileostomy.  The previous ostomy reversal incision in the right lower   quadrant was left in place.  We then placed three 19-Chilean round drains   within the pelvis, #1 in the left flank and mid abdomen, #2 in the right flank   and mid abdomen, and #3 in the pelvis.  These were sewn to the skin with 3-0   nylons.    Now using clean gloves and instruments, we closed the abdomen in the midline   with running 0 PDS sutures and interrupted 2-0 Vicryl internal retention   stitches.  With the fascia closed, we irrigated the wound again, loosely   reapproximated the subQ tissues with 3-0 Vicryl and staples, and placed a   Prevena wound VAC system in place over the closed wound.  We then hooked all   the drains to bulb suction and then opened the end of the small bowel and   matured this as an ileostomy using 3-0 Vicryl sutures with a Megan technique.    Ostomy appliance was placed and the procedure concluded.  Patient was   returned to the ICU in guarded condition.  All instrument counts were correct   at the end of the procedure.       ____________________________________     MD BLANCHE Muller / BRIGID    DD:  10/10/2020 11:59:14  DT:  10/10/2020 12:27:40    D#:  [id number]  Job#:  [id number]

## 2020-10-10 NOTE — PROGRESS NOTES
10/10  Pt seen and examined, sitting up in bed.  Having paranoia and delirium again this morning.  Thinks this may be due to morphine, switching to fentanyl.  NGt with minimal output, coming out this morning.  Remaining NPO until ileostomy output picks up.  Ongoing care per surgical ICU team.

## 2020-10-10 NOTE — PROGRESS NOTES
Trauma / Surgical Daily Progress Note    Date of Service  10/10/2020    Chief Complaint  70 y.o. female admitted 9/29/2020 with PARASTOMAL HERNIA, COLOSTOMY IN PLACE    Interval Events  No pressors  Clear .  IS 1250  Wound :  Previna.  Drains in place.  Serosanguinous  OOB.  Ambulate today  Hallucinations: Morphine.  Change pca to fentanyl  Not distended.  DC NG. Minimal output.  Minimal ostomy  Continue TPN.    Urine output adequate.    Reduce IVF    Review of Systems  Review of Systems     Vital Signs for last 24 hours  Temp:  [36.2 °C (97.1 °F)-37.1 °C (98.8 °F)] 37.1 °C (98.7 °F)  Pulse:  [] 95  Resp:  [7-54] 11  BP: ()/(57-90) 128/70  SpO2:  [88 %-99 %] 96 %    Hemodynamic parameters for last 24 hours       Respiratory Data     Respiration: (!) 11, Pulse Oximetry: 96 %        RUL Breath Sounds: Clear, RML Breath Sounds: Clear, RLL Breath Sounds: Diminished, DREW Breath Sounds: Clear, LLL Breath Sounds: Diminished    Physical Exam  Physical Exam  HENT:      Head: Normocephalic.   Eyes:      General: No scleral icterus.  Cardiovascular:      Rate and Rhythm: Normal rate and regular rhythm.   Pulmonary:      Effort: No respiratory distress.      Breath sounds: No wheezing.   Abdominal:      Palpations: Abdomen is soft.      Tenderness: There is abdominal tenderness.   Musculoskeletal:         General: No tenderness.   Skin:     General: Skin is warm and dry.   Neurological:      General: No focal deficit present.      Mental Status: She is alert.   Psychiatric:         Mood and Affect: Mood normal.         Laboratory  Recent Results (from the past 24 hour(s))   ACCU-CHEK GLUCOSE    Collection Time: 10/09/20 11:49 AM   Result Value Ref Range    Glucose - Accu-Ck 131 (H) 65 - 99 mg/dL   Phosphorus    Collection Time: 10/10/20 12:20 AM   Result Value Ref Range    Phosphorus 2.8 2.5 - 4.5 mg/dL   Magnesium    Collection Time: 10/10/20 12:20 AM   Result Value Ref Range    Magnesium 2.3 1.5 - 2.5 mg/dL    POTASSIUM SERUM (K)    Collection Time: 10/10/20 12:20 AM   Result Value Ref Range    Potassium 3.8 3.6 - 5.5 mmol/L   CBC WITH DIFFERENTIAL    Collection Time: 10/10/20  6:20 AM   Result Value Ref Range    WBC 15.3 (H) 4.8 - 10.8 K/uL    RBC 2.95 (L) 4.20 - 5.40 M/uL    Hemoglobin 9.5 (L) 12.0 - 16.0 g/dL    Hematocrit 28.7 (L) 37.0 - 47.0 %    MCV 97.3 81.4 - 97.8 fL    MCH 32.2 27.0 - 33.0 pg    MCHC 33.1 (L) 33.6 - 35.0 g/dL    RDW 46.8 35.9 - 50.0 fL    Platelet Count 407 164 - 446 K/uL    MPV 9.1 9.0 - 12.9 fL    Neutrophils-Polys 84.60 (H) 44.00 - 72.00 %    Lymphocytes 6.20 (L) 22.00 - 41.00 %    Monocytes 7.10 0.00 - 13.40 %    Eosinophils 0.80 0.00 - 6.90 %    Basophils 0.30 0.00 - 1.80 %    Immature Granulocytes 1.00 (H) 0.00 - 0.90 %    Nucleated RBC 0.00 /100 WBC    Neutrophils (Absolute) 12.93 (H) 2.00 - 7.15 K/uL    Lymphs (Absolute) 0.94 (L) 1.00 - 4.80 K/uL    Monos (Absolute) 1.09 (H) 0.00 - 0.85 K/uL    Eos (Absolute) 0.12 0.00 - 0.51 K/uL    Baso (Absolute) 0.05 0.00 - 0.12 K/uL    Immature Granulocytes (abs) 0.15 (H) 0.00 - 0.11 K/uL    NRBC (Absolute) 0.00 K/uL   Comp Metabolic Panel    Collection Time: 10/10/20  6:20 AM   Result Value Ref Range    Sodium 135 135 - 145 mmol/L    Potassium 3.6 3.6 - 5.5 mmol/L    Chloride 101 96 - 112 mmol/L    Co2 27 20 - 33 mmol/L    Anion Gap 7.0 7.0 - 16.0    Glucose 149 (H) 65 - 99 mg/dL    Bun 12 8 - 22 mg/dL    Creatinine 0.44 (L) 0.50 - 1.40 mg/dL    Calcium 8.1 (L) 8.5 - 10.5 mg/dL    AST(SGOT) 16 12 - 45 U/L    ALT(SGPT) 18 2 - 50 U/L    Alkaline Phosphatase 59 30 - 99 U/L    Total Bilirubin 1.7 (H) 0.1 - 1.5 mg/dL    Albumin 2.2 (L) 3.2 - 4.9 g/dL    Total Protein 4.5 (L) 6.0 - 8.2 g/dL    Globulin 2.3 1.9 - 3.5 g/dL    A-G Ratio 1.0 g/dL   IONIZED CALCIUM    Collection Time: 10/10/20  6:20 AM   Result Value Ref Range    Ionized Calcium 1.1 1.1 - 1.3 mmol/L   ESTIMATED GFR    Collection Time: 10/10/20  6:20 AM   Result Value Ref Range    GFR If  African American >60 >60 mL/min/1.73 m 2    GFR If Non African American >60 >60 mL/min/1.73 m 2       Fluids    Intake/Output Summary (Last 24 hours) at 10/10/2020 1102  Last data filed at 10/10/2020 1000  Gross per 24 hour   Intake 3978.77 ml   Output 1730 ml   Net 2248.77 ml       Core Measures & Quality Metrics  Labs reviewed, Medications reviewed and Radiology images reviewed        DVT Prophylaxis: Enoxaparin (Lovenox)  DVT prophylaxis - mechanical: SCDs          ISRAEL Score  ETOH Screening    Assessment/Plan  Colon perforation (HCC)  Assessment & Plan  9/29 Open Claudine's colostomy reversal, lysis of adhesions.  10/8 Free air / Ex lap with right colectomy with ileostomy for perforated cecum  Intraoperative cultures taken. Zosyn initiated.   10/10 Zosyn 3/7  Adebayo Boyce MD. Surgery     Multilobar lung infiltrate  Assessment & Plan  CXR:There is a right upper lobe opacity, consistent with pneumonia.  On Zosyn  Trend CXR.  Aggressive pulmonary hygiene.     No contraindication to deep vein thrombosis (DVT) prophylaxis- (present on admission)  Assessment & Plan  9/30 Pharmacological DVT prophylaxis, Lovenox, initiated.     Screening examination for infectious disease- (present on admission)  Assessment & Plan  10/8 10/8 COVID-19 specimen sent. Negative.       Discussed patient condition with RN, RT and Pharmacy.  CRITICAL CARE TIME EXCLUDING PROCEDURES: 35   Minutes.Decision making of high complexity.  I reviewed clinical labs, trends and orders for follow up.  Review of imaging,reports, consultant documentation .  Utilization of the information in todays decision making.   I evaluated the patient condition at bedside and discussed the daily plan(s) with available nursing staff,  pharmacists on rounds. Managing intraabdominal abscess, IV antibiotics, TPN,  IV fluids, Fentanyl and Morphine drips and titration.  Anticoagulation

## 2020-10-11 ENCOUNTER — APPOINTMENT (OUTPATIENT)
Dept: RADIOLOGY | Facility: MEDICAL CENTER | Age: 70
DRG: 329 | End: 2020-10-11
Attending: SURGERY
Payer: MEDICARE

## 2020-10-11 PROBLEM — E46 MALNUTRITION (HCC): Status: ACTIVE | Noted: 2020-10-11

## 2020-10-11 LAB
ALBUMIN SERPL BCP-MCNC: 2.1 G/DL (ref 3.2–4.9)
ALBUMIN/GLOB SERPL: 1 G/DL
ALP SERPL-CCNC: 83 U/L (ref 30–99)
ALT SERPL-CCNC: 28 U/L (ref 2–50)
ANION GAP SERPL CALC-SCNC: 4 MMOL/L (ref 7–16)
AST SERPL-CCNC: 27 U/L (ref 12–45)
BASOPHILS # BLD AUTO: 0.2 % (ref 0–1.8)
BASOPHILS # BLD: 0.02 K/UL (ref 0–0.12)
BILIRUB SERPL-MCNC: 0.8 MG/DL (ref 0.1–1.5)
BUN SERPL-MCNC: 13 MG/DL (ref 8–22)
CA-I SERPL-SCNC: 1.1 MMOL/L (ref 1.1–1.3)
CALCIUM SERPL-MCNC: 8 MG/DL (ref 8.5–10.5)
CHLORIDE SERPL-SCNC: 104 MMOL/L (ref 96–112)
CO2 SERPL-SCNC: 31 MMOL/L (ref 20–33)
CREAT SERPL-MCNC: 0.36 MG/DL (ref 0.5–1.4)
EOSINOPHIL # BLD AUTO: 0.23 K/UL (ref 0–0.51)
EOSINOPHIL NFR BLD: 2.1 % (ref 0–6.9)
ERYTHROCYTE [DISTWIDTH] IN BLOOD BY AUTOMATED COUNT: 46.2 FL (ref 35.9–50)
GLOBULIN SER CALC-MCNC: 2.1 G/DL (ref 1.9–3.5)
GLUCOSE SERPL-MCNC: 135 MG/DL (ref 65–99)
HCT VFR BLD AUTO: 26.1 % (ref 37–47)
HGB BLD-MCNC: 8.6 G/DL (ref 12–16)
IMM GRANULOCYTES # BLD AUTO: 0.11 K/UL (ref 0–0.11)
IMM GRANULOCYTES NFR BLD AUTO: 1 % (ref 0–0.9)
LYMPHOCYTES # BLD AUTO: 0.78 K/UL (ref 1–4.8)
LYMPHOCYTES NFR BLD: 7.1 % (ref 22–41)
MAGNESIUM SERPL-MCNC: 1.9 MG/DL (ref 1.5–2.5)
MCH RBC QN AUTO: 32 PG (ref 27–33)
MCHC RBC AUTO-ENTMCNC: 33 G/DL (ref 33.6–35)
MCV RBC AUTO: 97 FL (ref 81.4–97.8)
MONOCYTES # BLD AUTO: 0.89 K/UL (ref 0–0.85)
MONOCYTES NFR BLD AUTO: 8.1 % (ref 0–13.4)
NEUTROPHILS # BLD AUTO: 8.91 K/UL (ref 2–7.15)
NEUTROPHILS NFR BLD: 81.5 % (ref 44–72)
NRBC # BLD AUTO: 0 K/UL
NRBC BLD-RTO: 0 /100 WBC
PHOSPHATE SERPL-MCNC: 2.4 MG/DL (ref 2.5–4.5)
PLATELET # BLD AUTO: 405 K/UL (ref 164–446)
PMV BLD AUTO: 9.6 FL (ref 9–12.9)
POTASSIUM SERPL-SCNC: 3.9 MMOL/L (ref 3.6–5.5)
PROT SERPL-MCNC: 4.2 G/DL (ref 6–8.2)
RBC # BLD AUTO: 2.69 M/UL (ref 4.2–5.4)
SODIUM SERPL-SCNC: 139 MMOL/L (ref 135–145)
WBC # BLD AUTO: 10.9 K/UL (ref 4.8–10.8)

## 2020-10-11 PROCEDURE — 770006 HCHG ROOM/CARE - MED/SURG/GYN SEMI*

## 2020-10-11 PROCEDURE — 83735 ASSAY OF MAGNESIUM: CPT

## 2020-10-11 PROCEDURE — 82330 ASSAY OF CALCIUM: CPT

## 2020-10-11 PROCEDURE — 700111 HCHG RX REV CODE 636 W/ 250 OVERRIDE (IP): Performed by: SURGERY

## 2020-10-11 PROCEDURE — 84100 ASSAY OF PHOSPHORUS: CPT

## 2020-10-11 PROCEDURE — 80053 COMPREHEN METABOLIC PANEL: CPT

## 2020-10-11 PROCEDURE — 99233 SBSQ HOSP IP/OBS HIGH 50: CPT | Performed by: SURGERY

## 2020-10-11 PROCEDURE — 700101 HCHG RX REV CODE 250: Performed by: SURGERY

## 2020-10-11 PROCEDURE — A9270 NON-COVERED ITEM OR SERVICE: HCPCS | Performed by: SURGERY

## 2020-10-11 PROCEDURE — 85025 COMPLETE CBC W/AUTO DIFF WBC: CPT

## 2020-10-11 PROCEDURE — 700105 HCHG RX REV CODE 258: Performed by: SURGERY

## 2020-10-11 PROCEDURE — 700102 HCHG RX REV CODE 250 W/ 637 OVERRIDE(OP): Performed by: SURGERY

## 2020-10-11 RX ORDER — HYDRALAZINE HYDROCHLORIDE 25 MG/1
25 TABLET, FILM COATED ORAL EVERY 6 HOURS PRN
Status: DISCONTINUED | OUTPATIENT
Start: 2020-10-11 | End: 2020-10-16 | Stop reason: HOSPADM

## 2020-10-11 RX ORDER — FLUCONAZOLE 2 MG/ML
400 INJECTION, SOLUTION INTRAVENOUS EVERY 24 HOURS
Status: DISCONTINUED | OUTPATIENT
Start: 2020-10-11 | End: 2020-10-12

## 2020-10-11 RX ADMIN — ONDANSETRON 4 MG: 2 INJECTION INTRAMUSCULAR; INTRAVENOUS at 21:17

## 2020-10-11 RX ADMIN — IPRATROPIUM BROMIDE 2 SPRAY: 21 SPRAY NASAL at 05:41

## 2020-10-11 RX ADMIN — ENOXAPARIN SODIUM 40 MG: 40 INJECTION SUBCUTANEOUS at 06:29

## 2020-10-11 RX ADMIN — PILOCARPINE HYDROCHLORIDE 5 MG: 5 TABLET, FILM COATED ORAL at 12:29

## 2020-10-11 RX ADMIN — IPRATROPIUM BROMIDE 2 SPRAY: 21 SPRAY NASAL at 17:36

## 2020-10-11 RX ADMIN — POTASSIUM PHOSPHATE, MONOBASIC AND POTASSIUM PHOSPHATE, DIBASIC 15 MMOL: 224; 236 INJECTION, SOLUTION, CONCENTRATE INTRAVENOUS at 17:36

## 2020-10-11 RX ADMIN — PILOCARPINE HYDROCHLORIDE 5 MG: 5 TABLET, FILM COATED ORAL at 05:40

## 2020-10-11 RX ADMIN — KETOROLAC TROMETHAMINE 15 MG: 30 INJECTION, SOLUTION INTRAMUSCULAR at 05:41

## 2020-10-11 RX ADMIN — CALCIUM GLUCONATE: 98 INJECTION, SOLUTION INTRAVENOUS at 19:58

## 2020-10-11 RX ADMIN — HYDRALAZINE HYDROCHLORIDE 25 MG: 25 TABLET, FILM COATED ORAL at 22:04

## 2020-10-11 RX ADMIN — DORZOLAMIDE HYDROCHLORIDE 1 DROP: 20 SOLUTION/ DROPS OPHTHALMIC at 05:41

## 2020-10-11 RX ADMIN — FLUCONAZOLE 400 MG: 2 INJECTION, SOLUTION INTRAVENOUS at 12:29

## 2020-10-11 RX ADMIN — DULOXETINE HYDROCHLORIDE 120 MG: 60 CAPSULE, DELAYED RELEASE ORAL at 05:40

## 2020-10-11 RX ADMIN — KETOROLAC TROMETHAMINE 15 MG: 30 INJECTION, SOLUTION INTRAMUSCULAR at 17:37

## 2020-10-11 RX ADMIN — TIMOLOL MALEATE 1 DROP: 5 SOLUTION/ DROPS OPHTHALMIC at 06:00

## 2020-10-11 RX ADMIN — PIPERACILLIN AND TAZOBACTAM 3.38 G: 3; .375 INJECTION, POWDER, LYOPHILIZED, FOR SOLUTION INTRAVENOUS; PARENTERAL at 12:44

## 2020-10-11 RX ADMIN — PILOCARPINE HYDROCHLORIDE 5 MG: 5 TABLET, FILM COATED ORAL at 17:38

## 2020-10-11 RX ADMIN — PIPERACILLIN AND TAZOBACTAM 3.38 G: 3; .375 INJECTION, POWDER, LYOPHILIZED, FOR SOLUTION INTRAVENOUS; PARENTERAL at 05:40

## 2020-10-11 RX ADMIN — KETOROLAC TROMETHAMINE 15 MG: 30 INJECTION, SOLUTION INTRAMUSCULAR at 01:10

## 2020-10-11 RX ADMIN — PIPERACILLIN AND TAZOBACTAM 3.38 G: 3; .375 INJECTION, POWDER, LYOPHILIZED, FOR SOLUTION INTRAVENOUS; PARENTERAL at 21:59

## 2020-10-11 RX ADMIN — TIMOLOL MALEATE 1 DROP: 5 SOLUTION/ DROPS OPHTHALMIC at 17:36

## 2020-10-11 RX ADMIN — DORZOLAMIDE HYDROCHLORIDE 1 DROP: 20 SOLUTION/ DROPS OPHTHALMIC at 12:29

## 2020-10-11 RX ADMIN — KETOROLAC TROMETHAMINE 15 MG: 30 INJECTION, SOLUTION INTRAMUSCULAR at 12:31

## 2020-10-11 RX ADMIN — LATANOPROST 1 DROP: 50 SOLUTION OPHTHALMIC at 20:09

## 2020-10-11 RX ADMIN — DORZOLAMIDE HYDROCHLORIDE 1 DROP: 20 SOLUTION/ DROPS OPHTHALMIC at 17:36

## 2020-10-11 RX ADMIN — TRAZODONE HYDROCHLORIDE 150 MG: 100 TABLET ORAL at 20:07

## 2020-10-11 ASSESSMENT — PAIN DESCRIPTION - PAIN TYPE
TYPE: ACUTE PAIN

## 2020-10-11 ASSESSMENT — FIBROSIS 4 INDEX: FIB4 SCORE: 0.65

## 2020-10-11 NOTE — PROGRESS NOTES
Pharmacy TPN Day # 3       10/11/2020    Dosing Weight   55 kg      TPN currently providing 100% of goal      TPN goal: 6647-8284 kcal/day including 1.2-1.6 gm/kg/day Protein      TPN indication: Bowel resection    Pertinent PMH: Patient with history of transverse and left colectomy for ischemia admitted on  for open Katz's colostomy reversal. Patient returned to the OR on 10/8 for an emergent colorectal anastomosis taken down and end ileostomy for perforated cecum. Parenteral nutrition initiated 10/9 due to concern for GI integrity and expected prolonged period time without adequate enteral nutrition.    Temp (24hrs), Av.9 °C (98.4 °F), Min:36.5 °C (97.7 °F), Max:37.1 °C (98.8 °F)    Recent Labs     10/09/20  0334 10/10/20  0020 10/10/20  0620 10/11/20  0600   SODIUM 131*  --  135 139   POTASSIUM 3.8 3.8 3.6 3.9   CHLORIDE 99  --  101 104   CO2 26  --  27 31   BUN 10  --  12 13   CREATININE 0.47*  --  0.44* 0.36*   GLUCOSE 141*  --  149* 135*   CALCIUM 7.6*  --  8.1* 8.0*   ASTSGOT 15  --  16 27   ALTSGPT 16  --  18 28   ALBUMIN 2.1*  --  2.2* 2.1*   TBILIRUBIN 2.4*  --  1.7* 0.8   PHOSPHORUS 3.6 2.8  --  2.4*   MAGNESIUM 1.5 2.3  --  1.9     Accu-Checks  Recent Labs     10/08/20  2055 10/09/20  1149   POCGLUCOSE 127* 131*       Vitals:    10/11/20 0700 10/11/20 0800 10/11/20 0900 10/11/20 1000   BP: 127/73 153/82 (!) 173/81 150/70   Weight:       Height:           Intake/Output Summary (Last 24 hours) at 10/11/2020 1315  Last data filed at 10/11/2020 1000  Gross per 24 hour   Intake 3279.92 ml   Output 1570 ml   Net 1709.92 ml       Orders Placed This Encounter   Procedures   • Diet Order Clear Liquid     Standing Status:   Standing     Number of Occurrences:   1     Order Specific Question:   Diet:     Answer:   Clear Liquid [10]         TPN for past 72 hours (Show up to 3 orders; newest on the left. Changes between the two most recent orders are indicated.)     Start date and time   10/10/2020 2000  10/09/2020 2000      TPN Central Line Formulation [[id number]] TPN Central Line Formulation [[id number]]    Order Status  Active Completed    Last Admin  Rate Verify at 10/11/2020 0710 by Alejandra Mccormack R.N. New Bag at 10/10/2020 0024 by Kathy Islas R.N.       Base    Clinisol 15%  80 g 40 g    dextrose  185 g 125 g    fat emulsions 20%  35 g 10 g       Additives    potassium phosphate  15 mmol 15 mmol    potassium chloride  40 mEq 20 mEq    sodium acetate  154 mEq 154 mEq    sodium chloride  154 mEq 154 mEq    magnesium sulfate  12 mEq 16 mEq    calcium GLUConate  4.65 mEq 4.65 mEq    M.T.E. -5 Adult  1 mL 1 mL    M.V.I. Adult  10 mL 10 mL    famotidine  40 mg 40 mg    thiamine  -- 100 mg    folic acid  1 mg 1 mg       QS Base    sterile water for inj(pf)  850.71 mL 1,336.09 mL       Electrolyte Ion Calculated Amount    Sodium  308 mEq 308 mEq    Potassium  62 mEq 42 mEq    Calcium  4.65 mEq 4.65 mEq    Magnesium  12.02 mEq 16 mEq    Aluminum  -- --    Phosphate  15 mmol 15 mmol    Chloride  194 mEq 174 mEq    Acetate  221.73 mEq 187.87 mEq       Other    Total Protein  80 g 40 g    Total Protein/kg  1.37 g/kg 0.71 g/kg    Glucose Infusion Rate  2.2 mg/kg/min 1.54 mg/kg/min    Osmolarity (Estimated)  -- --    Volume  1,992 mL 1,992 mL    Rate  83 mL/hr 83 mL/hr    Dosing Weight  58.5 kg 56.3 kg    In[facility]            This formula provides:  % kcal as lipids = 27  Grams protein/kg = 1.45  Non-protein calories = 979  Kcals/kg = 24  Total daily calories = 1299    Comments:  1. Patient clinically stable following advance of TPN to goal kcal last evening and is cleared for transfer out of ICU. Ostomy having small amounts of output and OK to trial clear liquid diet today per surgeon. Patient remains on TPN + LR for TIVF of 125 mL/hr pending tolerance of enteral intake.  2. Micronutrients/Electrolytes: KPhos supplementation ordered external of TPN, all other lytes stable and no changes to TPN  formulation indicated.   3. Macronutrients/glycemic control: Macronutrients advanced to goal with current TPN, BG remain <180 mg/dL and no insulin indicated.   4. Will continue TPN at 100% of estimated nutritional requirements and follow for tolerance of enteral nutrition for changes to TPN as appropriate.    Pharmacy will continue to follow.     Collette White, PharmD, BCCCP

## 2020-10-11 NOTE — PROGRESS NOTES
Trauma / Surgical Daily Progress Note    Date of Service  10/11/2020    Chief Complaint  70 y.o. female admitted 9/29/2020 with PARASTOMAL HERNIA, COLOSTOMY IN PLACE    Interval Events    Ostomy ok.  Working small  OOB  Zosyn 3/7  Add fluconazole for yeast  UO adequate.  DC echols  Continue TPN  To sips clears.      Review of Systems  Review of Systems     Vital Signs for last 24 hours  Temp:  [36.5 °C (97.7 °F)-37.1 °C (98.8 °F)] 37.1 °C (98.8 °F)  Pulse:  [] 113  Resp:  [7-41] 28  BP: ()/(54-88) 150/70  SpO2:  [89 %-98 %] 96 %    Hemodynamic parameters for last 24 hours       Respiratory Data     Respiration: (!) 28, Pulse Oximetry: 96 %        RUL Breath Sounds: Clear, RML Breath Sounds: Clear, RLL Breath Sounds: Diminished, DREW Breath Sounds: Clear, LLL Breath Sounds: Diminished    Physical Exam  Physical Exam  HENT:      Head: Normocephalic.   Eyes:      General: No scleral icterus.  Cardiovascular:      Rate and Rhythm: Normal rate and regular rhythm.   Pulmonary:      Effort: No respiratory distress.      Breath sounds: No wheezing.   Abdominal:      Palpations: Abdomen is soft.      Tenderness: There is abdominal tenderness.   Musculoskeletal:         General: No tenderness.   Skin:     General: Skin is warm and dry.   Neurological:      General: No focal deficit present.      Mental Status: She is alert.   Psychiatric:         Mood and Affect: Mood normal.         Laboratory  Recent Results (from the past 24 hour(s))   IONIZED CALCIUM    Collection Time: 10/11/20  6:00 AM   Result Value Ref Range    Ionized Calcium 1.1 1.1 - 1.3 mmol/L   CBC WITH DIFFERENTIAL    Collection Time: 10/11/20  6:00 AM   Result Value Ref Range    WBC 10.9 (H) 4.8 - 10.8 K/uL    RBC 2.69 (L) 4.20 - 5.40 M/uL    Hemoglobin 8.6 (L) 12.0 - 16.0 g/dL    Hematocrit 26.1 (L) 37.0 - 47.0 %    MCV 97.0 81.4 - 97.8 fL    MCH 32.0 27.0 - 33.0 pg    MCHC 33.0 (L) 33.6 - 35.0 g/dL    RDW 46.2 35.9 - 50.0 fL    Platelet Count 405  164 - 446 K/uL    MPV 9.6 9.0 - 12.9 fL    Neutrophils-Polys 81.50 (H) 44.00 - 72.00 %    Lymphocytes 7.10 (L) 22.00 - 41.00 %    Monocytes 8.10 0.00 - 13.40 %    Eosinophils 2.10 0.00 - 6.90 %    Basophils 0.20 0.00 - 1.80 %    Immature Granulocytes 1.00 (H) 0.00 - 0.90 %    Nucleated RBC 0.00 /100 WBC    Neutrophils (Absolute) 8.91 (H) 2.00 - 7.15 K/uL    Lymphs (Absolute) 0.78 (L) 1.00 - 4.80 K/uL    Monos (Absolute) 0.89 (H) 0.00 - 0.85 K/uL    Eos (Absolute) 0.23 0.00 - 0.51 K/uL    Baso (Absolute) 0.02 0.00 - 0.12 K/uL    Immature Granulocytes (abs) 0.11 0.00 - 0.11 K/uL    NRBC (Absolute) 0.00 K/uL   Comp Metabolic Panel    Collection Time: 10/11/20  6:00 AM   Result Value Ref Range    Sodium 139 135 - 145 mmol/L    Potassium 3.9 3.6 - 5.5 mmol/L    Chloride 104 96 - 112 mmol/L    Co2 31 20 - 33 mmol/L    Anion Gap 4.0 (L) 7.0 - 16.0    Glucose 135 (H) 65 - 99 mg/dL    Bun 13 8 - 22 mg/dL    Creatinine 0.36 (L) 0.50 - 1.40 mg/dL    Calcium 8.0 (L) 8.5 - 10.5 mg/dL    AST(SGOT) 27 12 - 45 U/L    ALT(SGPT) 28 2 - 50 U/L    Alkaline Phosphatase 83 30 - 99 U/L    Total Bilirubin 0.8 0.1 - 1.5 mg/dL    Albumin 2.1 (L) 3.2 - 4.9 g/dL    Total Protein 4.2 (L) 6.0 - 8.2 g/dL    Globulin 2.1 1.9 - 3.5 g/dL    A-G Ratio 1.0 g/dL   MAGNESIUM    Collection Time: 10/11/20  6:00 AM   Result Value Ref Range    Magnesium 1.9 1.5 - 2.5 mg/dL   PHOSPHORUS    Collection Time: 10/11/20  6:00 AM   Result Value Ref Range    Phosphorus 2.4 (L) 2.5 - 4.5 mg/dL   ESTIMATED GFR    Collection Time: 10/11/20  6:00 AM   Result Value Ref Range    GFR If African American >60 >60 mL/min/1.73 m 2    GFR If Non African American >60 >60 mL/min/1.73 m 2       Fluids    Intake/Output Summary (Last 24 hours) at 10/11/2020 1148  Last data filed at 10/11/2020 1000  Gross per 24 hour   Intake 3381.84 ml   Output 1670 ml   Net 1711.84 ml       Core Measures & Quality Metrics  Labs reviewed, Medications reviewed and Radiology images  reviewed        DVT Prophylaxis: Enoxaparin (Lovenox)  DVT prophylaxis - mechanical: SCDs          ISRAEL Score  ETOH Screening    Assessment/Plan  Colon perforation (HCC)  Assessment & Plan  9/29 Open Claudine's colostomy reversal, lysis of adhesions.  10/8 Free air / Ex lap with right colectomy with ileostomy for perforated cecum  Intraoperative cultures taken. Zosyn initiated.   10/9 TPN initiated  10/11 Zosyn 4/7;  Add fluconazole: cultures positive for yeast  10/15 Antibiotic stop date  Adebayo Boyce MD. Surgery     Multilobar lung infiltrate  Assessment & Plan  CXR:There is a right upper lobe opacity, consistent with pneumonia.  10/8 Zosyn  10/9 CXR: Stable right-sided opacities, consistent with pneumonia and atelectasis.   Trend CXR.  Aggressive pulmonary hygiene.     Malnutrition (HCC)  Assessment & Plan  Continue TPN until PO intake is adequate    No contraindication to deep vein thrombosis (DVT) prophylaxis- (present on admission)  Assessment & Plan  9/30 Pharmacological DVT prophylaxis, Lovenox, initiated.     Screening examination for infectious disease- (present on admission)  Assessment & Plan  10/8 10/8 COVID-19 specimen sent. Negative.       Discussed patient condition with RN, RT and Pharmacy.  CRITICAL CARE TIME EXCLUDING PROCEDURES: 35   Minutes.Decision making of high complexity.  I reviewed clinical labs, trends and orders for follow up.  Review of imaging,reports, consultant documentation .  Utilization of the information in todays decision making.   I evaluated the patient condition at bedside and discussed the daily plan(s) with available nursing staff,  pharmacists on rounds. Managing TPN, IV antibiotics, anticoagulation, transition of care and transfer to [facility]

## 2020-10-11 NOTE — PROGRESS NOTES
Pt transferred to [place] [address] bed 2 with ACLS RN. All belongings sent with patient including wound supplies. Bedside report given to Cesia NEUMANN.

## 2020-10-11 NOTE — PROGRESS NOTES
2 RN skin check complete    Pt wearing nasal cannula, skin behind ears intact  R sided triple lumen IJ  Midline incision with prevena wound vac in place, CDI, no signs of air leak  x2 NOEL drains to right side of abdomen  LLQ ileostomy  x1 NOEL drain LUQ abd  Sacrum intact, mepilex removed, pt able to turn self from side to side.   Bilateral heels dry, flaky, intact.  All other skin assessed- no evidence of skin breakdown

## 2020-10-11 NOTE — PROGRESS NOTES
Trauma / Surgical Daily Progress Note    Date of Service  10/11/2020    Chief Complaint  70 y.o. female admitted 9/29/2020 with PARASTOMAL HERNIA, COLOSTOMY IN PLACE    Interval Events    Ostomy viable and producing. Small amount of liquid stool. Prevena vac functioning.   Drains serosanguinous.  WBC trend down. Antibiotic therapy, yes.    No delirium or hallucination with fentanyl.       - Continue TPN.   - DC echols.   - PT/OT.   - Clinically stable at this time, transfer to  [facility].  - Counseled.    Review of Systems  ROS     Vital Signs  Temp:  [36.5 °C (97.7 °F)-37.1 °C (98.7 °F)] 36.5 °C (97.7 °F)  Pulse:  [] 97  Resp:  [7-41] 19  BP: ()/(54-88) 152/73  SpO2:  [89 %-98 %] 97 %    Physical Exam  Physical Exam    Laboratory  Recent Results (from the past 24 hour(s))   IONIZED CALCIUM    Collection Time: 10/11/20  6:00 AM   Result Value Ref Range    Ionized Calcium 1.1 1.1 - 1.3 mmol/L   CBC WITH DIFFERENTIAL    Collection Time: 10/11/20  6:00 AM   Result Value Ref Range    WBC 10.9 (H) 4.8 - 10.8 K/uL    RBC 2.69 (L) 4.20 - 5.40 M/uL    Hemoglobin 8.6 (L) 12.0 - 16.0 g/dL    Hematocrit 26.1 (L) 37.0 - 47.0 %    MCV 97.0 81.4 - 97.8 fL    MCH 32.0 27.0 - 33.0 pg    MCHC 33.0 (L) 33.6 - 35.0 g/dL    RDW 46.2 35.9 - 50.0 fL    Platelet Count 405 164 - 446 K/uL    MPV 9.6 9.0 - 12.9 fL    Neutrophils-Polys 81.50 (H) 44.00 - 72.00 %    Lymphocytes 7.10 (L) 22.00 - 41.00 %    Monocytes 8.10 0.00 - 13.40 %    Eosinophils 2.10 0.00 - 6.90 %    Basophils 0.20 0.00 - 1.80 %    Immature Granulocytes 1.00 (H) 0.00 - 0.90 %    Nucleated RBC 0.00 /100 WBC    Neutrophils (Absolute) 8.91 (H) 2.00 - 7.15 K/uL    Lymphs (Absolute) 0.78 (L) 1.00 - 4.80 K/uL    Monos (Absolute) 0.89 (H) 0.00 - 0.85 K/uL    Eos (Absolute) 0.23 0.00 - 0.51 K/uL    Baso (Absolute) 0.02 0.00 - 0.12 K/uL    Immature Granulocytes (abs) 0.11 0.00 - 0.11 K/uL    NRBC (Absolute) 0.00 K/uL   Comp Metabolic Panel    Collection Time:  10/11/20  6:00 AM   Result Value Ref Range    Sodium 139 135 - 145 mmol/L    Potassium 3.9 3.6 - 5.5 mmol/L    Chloride 104 96 - 112 mmol/L    Co2 31 20 - 33 mmol/L    Anion Gap 4.0 (L) 7.0 - 16.0    Glucose 135 (H) 65 - 99 mg/dL    Bun 13 8 - 22 mg/dL    Creatinine 0.36 (L) 0.50 - 1.40 mg/dL    Calcium 8.0 (L) 8.5 - 10.5 mg/dL    AST(SGOT) 27 12 - 45 U/L    ALT(SGPT) 28 2 - 50 U/L    Alkaline Phosphatase 83 30 - 99 U/L    Total Bilirubin 0.8 0.1 - 1.5 mg/dL    Albumin 2.1 (L) 3.2 - 4.9 g/dL    Total Protein 4.2 (L) 6.0 - 8.2 g/dL    Globulin 2.1 1.9 - 3.5 g/dL    A-G Ratio 1.0 g/dL   MAGNESIUM    Collection Time: 10/11/20  6:00 AM   Result Value Ref Range    Magnesium 1.9 1.5 - 2.5 mg/dL   PHOSPHORUS    Collection Time: 10/11/20  6:00 AM   Result Value Ref Range    Phosphorus 2.4 (L) 2.5 - 4.5 mg/dL   ESTIMATED GFR    Collection Time: 10/11/20  6:00 AM   Result Value Ref Range    GFR If African American >60 >60 mL/min/1.73 m 2    GFR If Non African American >60 >60 mL/min/1.73 m 2       Fluids    Intake/Output Summary (Last 24 hours) at 10/11/2020 0716  Last data filed at 10/11/2020 0600  Gross per 24 hour   Intake 2738.17 ml   Output 1620 ml   Net 1118.17 ml       Core Measures & Quality Metrics  Core Measures & Quality Metrics  ISRAEL Score  ETOH Screening    Assessment/Plan  Colon perforation (HCC)  Assessment & Plan  9/29 Open Claudine's colostomy reversal, lysis of adhesions.  10/8 Free air / Ex lap with right colectomy with ileostomy for perforated cecum  Intraoperative cultures taken. Zosyn initiated.   10/9 TPN initiated  10/11 Zosyn 4/7  10/15 Antibiotic stop date  Adebayo Boyce MD. Surgery     Multilobar lung infiltrate  Assessment & Plan  CXR:There is a right upper lobe opacity, consistent with pneumonia.  10/8 Zosyn  10/9 CXR: Stable right-sided opacities, consistent with pneumonia and atelectasis.   Trend CXR.  Aggressive pulmonary hygiene.     No contraindication to deep vein thrombosis (DVT)  prophylaxis- (present on admission)  Assessment & Plan  9/30 Pharmacological DVT prophylaxis, Lovenox, initiated.     Screening examination for infectious disease- (present on admission)  Assessment & Plan  10/8 10/8 COVID-19 specimen sent. Negative.         Discussed patient condition with Patient and trauma surgery, Dr. Hemal Oleary.

## 2020-10-11 NOTE — CARE PLAN
Problem: Pain Management  Goal: Pain level will decrease to patient's comfort goal  Outcome: PROGRESSING AS EXPECTED  Intervention: Follow pain managment plan developed in collaboration with patient and Interdisciplinary Team  Note: Patient oriented to fentanyl PCA, understands parameters and operation.     Problem: Venous Thromboembolism (VTW)/Deep Vein Thrombosis (DVT) Prevention:  Goal: Patient will participate in Venous Thrombosis (VTE)/Deep Vein Thrombosis (DVT)Prevention Measures  Outcome: PROGRESSING AS EXPECTED  Intervention: Assess and monitor for anticoagulation complications  Note: Patient wearing SCD's, receiving lovenox per MAR.

## 2020-10-11 NOTE — PROGRESS NOTES
Bedside report received from Jonny RN  Assessment completed.  Pt is A&O x4. Pt on 1L NC.   Pain 4/10. Pt has Fentanyl PCA pump in use.   Denies nausea.   - numbness, - tingling.  Midline incision with prevena, CDI, no signs of air leak  x2 NOEL drains to R abd, serosanguinous output  LLQ ileostomy, + output  x1 NOEL drain above LLQ ileostomy, serosanguinous output  Geiger removed in ICU, pt able to void.   Clear liquid diet. Tolerates well.   Pt up SBA. Tolerates well.   Call light within reach. All needs met at this time. Fall Precautions and hourly rounding in place.

## 2020-10-11 NOTE — CARE PLAN
Problem: Pain Management  Goal: Pain level will decrease to patient's comfort goal  Outcome: PROGRESSING AS EXPECTED     Problem: Urinary Elimination:  Goal: Ability to reestablish a normal urinary elimination pattern will improve  Outcome: PROGRESSING AS EXPECTED     Problem: Communication  Goal: The ability to communicate needs accurately and effectively will improve  Outcome: PROGRESSING AS EXPECTED     Problem: Safety  Goal: Will remain free from injury  Outcome: PROGRESSING AS EXPECTED  Goal: Will remain free from falls  Outcome: PROGRESSING AS EXPECTED     Problem: Infection  Goal: Will remain free from infection  Outcome: PROGRESSING AS EXPECTED     Problem: Venous Thromboembolism (VTW)/Deep Vein Thrombosis (DVT) Prevention:  Goal: Patient will participate in Venous Thrombosis (VTE)/Deep Vein Thrombosis (DVT)Prevention Measures  Outcome: PROGRESSING AS EXPECTED     Problem: Bowel/Gastric:  Goal: Normal bowel function is maintained or improved  Outcome: PROGRESSING AS EXPECTED  Goal: Will not experience complications related to bowel motility  Outcome: PROGRESSING AS EXPECTED     Problem: Knowledge Deficit  Goal: Knowledge of disease process/condition, treatment plan, diagnostic tests, and medications will improve  Outcome: PROGRESSING AS EXPECTED  Goal: Knowledge of the prescribed therapeutic regimen will improve  Outcome: PROGRESSING AS EXPECTED     Problem: Discharge Barriers/Planning  Goal: Patient's continuum of care needs will be met  Outcome: PROGRESSING AS EXPECTED     Problem: Fluid Volume:  Goal: Will maintain balanced intake and output  Outcome: PROGRESSING AS EXPECTED     Problem: Respiratory:  Goal: Respiratory status will improve  Outcome: PROGRESSING AS EXPECTED     Problem: Psychosocial Needs:  Goal: Level of anxiety will decrease  Outcome: PROGRESSING AS EXPECTED     Problem: Mobility  Goal: Risk for activity intolerance will decrease  Outcome: PROGRESSING AS EXPECTED

## 2020-10-11 NOTE — PROGRESS NOTES
10/11  Pt seen and examined, stable in ICU, did better with fentanyl PCA overnight, no more delirium off morphine.  Ambulating with assistance, transfer orders in place to floor from ICU.  Pain is controlled.  She isn't sure if the ileostomy is putting much out.  Geiger out today as well.      Exam benign    Agree with transfer to floor from ICU.  Ok to start clears PO today as well.

## 2020-10-11 NOTE — WOUND TEAM
"[facility]  Inpatient Services  New Ostomy Management & Teaching    HPI:  Reviewed   PMH: Reviewed   SH: Reviewed    Subjective: I think I can do this side (left side) better since I'm right handed.\"    Objective: appliance intact, overlapping midline Prevena     Ileostomy 10/08/20 Standard (Megan, end) (Active)   Wound Image      Stomal Appliance Assessment Intact    Stoma Assessment Pink    Stoma Shape Budded Greater Than One Inch    Stoma Size (in) 1.25    Peristomal Assessment Clean;Dry;Other (Comment)    Mucocutaneous Junction Intact    Treatment Appliance Changed;Bag Change;Cleansed with water/washcloth;Site care    Peristomal Protectant No Sting Skin Prep;Hydrofiber Silver;Hydrocolloid;Paste Ring    Stomal Appliance Paste Ring, 2\";2 1/4\" (57mm) CTF    Output (mL) 100 mL    Output Color Green    WOUND RN ONLY - Stomal Appliance  2 Piece;Paste Ring, 2\";2 1/4\" (57mm) CTF    Appliance (Pouch) # [id number]    Appliance Brand [employer]    Appliance Supplier [employer]    Secure Start completed Yes    Ostomy Care Resources Provided UOAA Tip Sheet    WOUND NURSE ONLY - Time Spent with Patient (mins) 60                  Assessment:   Interventions: Patient observed all steps; appliance carefully removed, had to cut away some drape from prevena. Cleansed stoma and skin with moist wash cloth, dried. Applied no sting to crystal stomal skin and added more drape to get seal on Prevena. Placed small strip of plain Aquacel into FTW distal stoma, covered with piece of thin hydrocolloid. Made template, cut barrier to fit, removed backing and applied MN to barrier opening. Applied barrier to skin and snapped on pouch, closed end.     Pt education: Questions and concerns addressed, difficult to be hands on today; patient hand strength not good. Went over educational handouts, left at bedside. Educated patient that an ileostomy is different from a colostomy and patient will need to consume more fluids and change appliance " more frequently. Patient had a colostomy for the last 4 years, takedown was performed on 9/29/20, but had to be taken back to OR on 10/8 due to perforated cecum and placement of end ileo. Will need reinforcement of ileostomy care and considerations with food/fluids and medications. Ordered high output pouches and stoma powder for next appliance change. Patient has preference for pre-cut, and likes the 2 piece rather than 1 piece that she has at home.  Will need to veronika book for next visit.     Plan: Wound team to continue to follow for ostomy needs and teaching     Anticipated discharge needs: Supplies, supplier information, possible HH or outpatient ostomy clinic

## 2020-10-12 LAB
ALBUMIN SERPL BCP-MCNC: 2.2 G/DL (ref 3.2–4.9)
ALBUMIN/GLOB SERPL: 0.9 G/DL
ALP SERPL-CCNC: 144 U/L (ref 30–99)
ALT SERPL-CCNC: 37 U/L (ref 2–50)
ANION GAP SERPL CALC-SCNC: 8 MMOL/L (ref 7–16)
AST SERPL-CCNC: 25 U/L (ref 12–45)
BACTERIA SPEC ANAEROBE CULT: NORMAL
BASOPHILS # BLD AUTO: 0.5 % (ref 0–1.8)
BASOPHILS # BLD: 0.06 K/UL (ref 0–0.12)
BILIRUB SERPL-MCNC: 0.6 MG/DL (ref 0.1–1.5)
BUN SERPL-MCNC: 12 MG/DL (ref 8–22)
CA-I SERPL-SCNC: 1.2 MMOL/L (ref 1.1–1.3)
CALCIUM SERPL-MCNC: 8 MG/DL (ref 8.5–10.5)
CHLORIDE SERPL-SCNC: 99 MMOL/L (ref 96–112)
CO2 SERPL-SCNC: 28 MMOL/L (ref 20–33)
CREAT SERPL-MCNC: 0.3 MG/DL (ref 0.5–1.4)
EOSINOPHIL # BLD AUTO: 0.34 K/UL (ref 0–0.51)
EOSINOPHIL NFR BLD: 2.6 % (ref 0–6.9)
ERYTHROCYTE [DISTWIDTH] IN BLOOD BY AUTOMATED COUNT: 46.2 FL (ref 35.9–50)
GLOBULIN SER CALC-MCNC: 2.5 G/DL (ref 1.9–3.5)
GLUCOSE SERPL-MCNC: 126 MG/DL (ref 65–99)
HCT VFR BLD AUTO: 28.1 % (ref 37–47)
HGB BLD-MCNC: 9.2 G/DL (ref 12–16)
IMM GRANULOCYTES # BLD AUTO: 0.26 K/UL (ref 0–0.11)
IMM GRANULOCYTES NFR BLD AUTO: 2 % (ref 0–0.9)
LYMPHOCYTES # BLD AUTO: 1.17 K/UL (ref 1–4.8)
LYMPHOCYTES NFR BLD: 9.1 % (ref 22–41)
MAGNESIUM SERPL-MCNC: 1.7 MG/DL (ref 1.5–2.5)
MCH RBC QN AUTO: 31.8 PG (ref 27–33)
MCHC RBC AUTO-ENTMCNC: 32.7 G/DL (ref 33.6–35)
MCV RBC AUTO: 97.2 FL (ref 81.4–97.8)
MONOCYTES # BLD AUTO: 1.5 K/UL (ref 0–0.85)
MONOCYTES NFR BLD AUTO: 11.6 % (ref 0–13.4)
NEUTROPHILS # BLD AUTO: 9.58 K/UL (ref 2–7.15)
NEUTROPHILS NFR BLD: 74.2 % (ref 44–72)
NRBC # BLD AUTO: 0 K/UL
NRBC BLD-RTO: 0 /100 WBC
PHOSPHATE SERPL-MCNC: 3.3 MG/DL (ref 2.5–4.5)
PLATELET # BLD AUTO: 428 K/UL (ref 164–446)
PMV BLD AUTO: 9.4 FL (ref 9–12.9)
POTASSIUM SERPL-SCNC: 3.9 MMOL/L (ref 3.6–5.5)
PREALB SERPL-MCNC: 7.8 MG/DL (ref 18–38)
PROT SERPL-MCNC: 4.7 G/DL (ref 6–8.2)
RBC # BLD AUTO: 2.89 M/UL (ref 4.2–5.4)
SIGNIFICANT IND 70042: NORMAL
SITE SITE: NORMAL
SODIUM SERPL-SCNC: 135 MMOL/L (ref 135–145)
SOURCE SOURCE: NORMAL
WBC # BLD AUTO: 12.9 K/UL (ref 4.8–10.8)

## 2020-10-12 PROCEDURE — 700102 HCHG RX REV CODE 250 W/ 637 OVERRIDE(OP): Performed by: SURGERY

## 2020-10-12 PROCEDURE — A9270 NON-COVERED ITEM OR SERVICE: HCPCS | Performed by: SURGERY

## 2020-10-12 PROCEDURE — 700105 HCHG RX REV CODE 258: Performed by: SURGERY

## 2020-10-12 PROCEDURE — 84100 ASSAY OF PHOSPHORUS: CPT

## 2020-10-12 PROCEDURE — 700111 HCHG RX REV CODE 636 W/ 250 OVERRIDE (IP): Performed by: SURGERY

## 2020-10-12 PROCEDURE — 80053 COMPREHEN METABOLIC PANEL: CPT

## 2020-10-12 PROCEDURE — 84134 ASSAY OF PREALBUMIN: CPT

## 2020-10-12 PROCEDURE — 700101 HCHG RX REV CODE 250: Performed by: SURGERY

## 2020-10-12 PROCEDURE — 85025 COMPLETE CBC W/AUTO DIFF WBC: CPT

## 2020-10-12 PROCEDURE — 770006 HCHG ROOM/CARE - MED/SURG/GYN SEMI*

## 2020-10-12 PROCEDURE — 82330 ASSAY OF CALCIUM: CPT

## 2020-10-12 PROCEDURE — 83735 ASSAY OF MAGNESIUM: CPT

## 2020-10-12 PROCEDURE — 97535 SELF CARE MNGMENT TRAINING: CPT

## 2020-10-12 RX ORDER — FLUCONAZOLE 200 MG/1
400 TABLET ORAL DAILY
Status: DISCONTINUED | OUTPATIENT
Start: 2020-10-13 | End: 2020-10-16 | Stop reason: HOSPADM

## 2020-10-12 RX ADMIN — PIPERACILLIN AND TAZOBACTAM 3.38 G: 3; .375 INJECTION, POWDER, LYOPHILIZED, FOR SOLUTION INTRAVENOUS; PARENTERAL at 12:59

## 2020-10-12 RX ADMIN — DORZOLAMIDE HYDROCHLORIDE 1 DROP: 20 SOLUTION/ DROPS OPHTHALMIC at 16:48

## 2020-10-12 RX ADMIN — IPRATROPIUM BROMIDE 2 SPRAY: 21 SPRAY NASAL at 16:48

## 2020-10-12 RX ADMIN — PILOCARPINE HYDROCHLORIDE 5 MG: 5 TABLET, FILM COATED ORAL at 11:59

## 2020-10-12 RX ADMIN — SODIUM CHLORIDE, POTASSIUM CHLORIDE, SODIUM LACTATE AND CALCIUM CHLORIDE: 600; 310; 30; 20 INJECTION, SOLUTION INTRAVENOUS at 19:42

## 2020-10-12 RX ADMIN — DORZOLAMIDE HYDROCHLORIDE 1 DROP: 20 SOLUTION/ DROPS OPHTHALMIC at 05:00

## 2020-10-12 RX ADMIN — CALCIUM GLUCONATE: 98 INJECTION, SOLUTION INTRAVENOUS at 19:40

## 2020-10-12 RX ADMIN — IPRATROPIUM BROMIDE 2 SPRAY: 21 SPRAY NASAL at 04:50

## 2020-10-12 RX ADMIN — FLUCONAZOLE 400 MG: 2 INJECTION, SOLUTION INTRAVENOUS at 04:49

## 2020-10-12 RX ADMIN — TRAZODONE HYDROCHLORIDE 150 MG: 100 TABLET ORAL at 19:42

## 2020-10-12 RX ADMIN — DULOXETINE HYDROCHLORIDE 120 MG: 60 CAPSULE, DELAYED RELEASE ORAL at 04:49

## 2020-10-12 RX ADMIN — PILOCARPINE HYDROCHLORIDE 5 MG: 5 TABLET, FILM COATED ORAL at 16:48

## 2020-10-12 RX ADMIN — TIMOLOL MALEATE 1 DROP: 5 SOLUTION/ DROPS OPHTHALMIC at 16:48

## 2020-10-12 RX ADMIN — TIMOLOL MALEATE 1 DROP: 5 SOLUTION/ DROPS OPHTHALMIC at 04:50

## 2020-10-12 RX ADMIN — PIPERACILLIN AND TAZOBACTAM 3.38 G: 3; .375 INJECTION, POWDER, LYOPHILIZED, FOR SOLUTION INTRAVENOUS; PARENTERAL at 20:01

## 2020-10-12 RX ADMIN — SODIUM CHLORIDE, POTASSIUM CHLORIDE, SODIUM LACTATE AND CALCIUM CHLORIDE: 600; 310; 30; 20 INJECTION, SOLUTION INTRAVENOUS at 08:00

## 2020-10-12 RX ADMIN — DORZOLAMIDE HYDROCHLORIDE 1 DROP: 20 SOLUTION/ DROPS OPHTHALMIC at 11:59

## 2020-10-12 RX ADMIN — PILOCARPINE HYDROCHLORIDE 5 MG: 5 TABLET, FILM COATED ORAL at 04:50

## 2020-10-12 RX ADMIN — ONDANSETRON 4 MG: 2 INJECTION INTRAMUSCULAR; INTRAVENOUS at 11:53

## 2020-10-12 RX ADMIN — LATANOPROST 1 DROP: 50 SOLUTION OPHTHALMIC at 19:46

## 2020-10-12 RX ADMIN — PIPERACILLIN AND TAZOBACTAM 3.38 G: 3; .375 INJECTION, POWDER, LYOPHILIZED, FOR SOLUTION INTRAVENOUS; PARENTERAL at 04:58

## 2020-10-12 RX ADMIN — Medication: at 13:44

## 2020-10-12 RX ADMIN — ENOXAPARIN SODIUM 40 MG: 40 INJECTION SUBCUTANEOUS at 04:49

## 2020-10-12 ASSESSMENT — COGNITIVE AND FUNCTIONAL STATUS - GENERAL
MOBILITY SCORE: 24
SUGGESTED CMS G CODE MODIFIER MOBILITY: CH

## 2020-10-12 ASSESSMENT — PAIN DESCRIPTION - PAIN TYPE
TYPE: ACUTE PAIN;SURGICAL PAIN
TYPE: ACUTE PAIN
TYPE: ACUTE PAIN
TYPE: ACUTE PAIN;SURGICAL PAIN

## 2020-10-12 ASSESSMENT — GAIT ASSESSMENTS
GAIT LEVEL OF ASSIST: SUPERVISED
DISTANCE (FEET): 500

## 2020-10-12 NOTE — PROGRESS NOTES
MD paged regarding HTN. Most recent BP of 185/95      Telephone order received from Carli for oral hydralazine 25 mg q6 PRN for SBP >160

## 2020-10-12 NOTE — PROGRESS NOTES
Assumed care, bedside report from Yaneli NEUMANN. Pt resting in bed and in no distress, drowsy/easily arousable. Fentanyl PCA rate verified. Bed in low position, call light w/in reach.

## 2020-10-12 NOTE — PROGRESS NOTES
Assumed care of patient at 1930  Assessment complete  Patient is AAOx4, however is forgetful and frequently has off the wall comments. However answers all questions appropriately  PCA in place, Fentanyl  1L o2  Ambulating to BR, voiding frequently  Standby assistance  Skin: Midline Prevena, LUQ NOEL drain, LLQ ileostomy, 2 NOEL drains Right    IV ABX infusing, TPN to Right neck IJ    BP elevated, PRN hydralazine given  Zofran given x1 for nausea    Will continue to closely monitor

## 2020-10-12 NOTE — PROGRESS NOTES
"10/12  S:  70 y.o.female s/p ExLap, r Colon, ileostomy due to perforation after a Katz's Reversal Procedure  POD# 4.  Patient stable since transfer out to the floor from ICU.  She is sitting up in chair this morning, tolerating clears PO and pain is under control with PCA.  She is having ileostomy output as well.  No nausea or emesis at this point.      O:  /79   Pulse 96   Temp 36.8 °C (98.3 °F) (Temporal)   Resp 16   Ht 1.626 m (5' 4.02\")   Wt 60.2 kg (132 lb 11.5 oz)   SpO2 98%   I/O last 3 completed shifts:  In: 4802.9 [P.O.:1060; I.V.:3421.3]  Out: 1928 [Urine:900; Drains:328]  Recent Labs     10/10/20  0620 10/11/20  0600 10/12/20  0530   SODIUM 135 139 135   POTASSIUM 3.6 3.9 3.9   CHLORIDE 101 104 99   CO2 27 31 28   GLUCOSE 149* 135* 126*   BUN 12 13 12   CREATININE 0.44* 0.36* 0.30*   CALCIUM 8.1* 8.0* 8.0*     Recent Labs     10/10/20  0620 10/11/20  0600 10/12/20  0530   WBC 15.3* 10.9* 12.9*   RBC 2.95* 2.69* 2.89*   HEMOGLOBIN 9.5* 8.6* 9.2*   HEMATOCRIT 28.7* 26.1* 28.1*   MCV 97.3 97.0 97.2   MCH 32.2 32.0 31.8   MCHC 33.1* 33.0* 32.7*   RDW 46.8 46.2 46.2   PLATELETCT 407 405 428   MPV 9.1 9.6 9.4       Alert and Oriented x3, No Acute Distress  Normal Respiratory Effort  Abdomen soft, appropriately tender  Incisions/Bandages clean/dry/intact  Extremities warm and well perfused  Ostomy pink and healthy, output liquid stool  NOEL Output Serosanguinous x3- 150/25/73    A/P:  Pain control with PCA until tolerating PO  Diet full liquids ok today  Fluids continue TPN until meeting caloric needs  Ambulate tid and ad brandee  Ongoing IV Abx for colon perforation, 7 days total  Drains in place until outputs fall.    "

## 2020-10-12 NOTE — CARE PLAN
Problem: Urinary Elimination:  Goal: Ability to reestablish a normal urinary elimination pattern will improve  Outcome: PROGRESSING AS EXPECTED     Problem: Communication  Goal: The ability to communicate needs accurately and effectively will improve  Outcome: PROGRESSING AS EXPECTED     Problem: Pain Management  Goal: Pain level will decrease to patient's comfort goal  Outcome: PROGRESSING SLOWER THAN EXPECTED

## 2020-10-12 NOTE — PROGRESS NOTES
Pharmacy TPN Day # 4       10/12/2020    Dosing Weight   55 kg                                                  TPN currently providing 100% of goal                                                  TPN goal: 3924-7148 kcal/day including 1.2-1.6 gm/kg/day Protein                                                  TPN indication: Bowel resection     Pertinent PMH: Patient with history of transverse and left colectomy for ischemia admitted on  for open Katz's colostomy reversal. Patient returned to the OR on 10/8 for an emergent colorectal anastomosis taken down and end ileostomy for perforated cecum. Parenteral nutrition initiated 10/9 due to concern for GI integrity and expected prolonged period time without adequate enteral nutrition.    Temp (24hrs), Av.9 °C (98.4 °F), Min:36.1 °C (97 °F), Max:38 °C (100.4 °F)  .  Recent Labs     10/10/20  0020 10/10/20  0620 10/11/20  0600 10/12/20  0530   SODIUM  --  135 139 135   POTASSIUM 3.8 3.6 3.9 3.9   CHLORIDE  --  101 104 99   CO2  --  27 31 28   BUN  --  12 13 12   CREATININE  --  0.44* 0.36* 0.30*   GLUCOSE  --  149* 135* 126*   CALCIUM  --  8.1* 8.0* 8.0*   ASTSGOT  --  16 27 25   ALTSGPT  --  18 28 37   ALBUMIN  --  2.2* 2.1* 2.2*   TBILIRUBIN  --  1.7* 0.8 0.6   PHOSPHORUS 2.8  --  2.4* 3.3   MAGNESIUM 2.3  --  1.9 1.7     Accu-Checks  Recent Labs     10/09/20  1149   POCGLUCOSE 131*       Vitals:    10/11/20 2000 10/11/20 2107 10/12/20 0000 10/12/20 0400   BP: (!) 180/100 (!) 185/95 155/81 126/79   Weight:       Height:           Intake/Output Summary (Last 24 hours) at 10/12/2020 0933  Last data filed at 10/12/2020 0458  Gross per 24 hour   Intake 2754 ml   Output 1023 ml   Net 1731 ml       Orders Placed This Encounter   Procedures   • Diet Order Full Liquid     Standing Status:   Standing     Number of Occurrences:   1     Order Specific Question:   Diet:     Answer:   Full Liquid [11]         TPN for past 72 hours (Show up to 3 orders; newest on the  left. Changes between the two most recent orders are indicated.)     Start date and time   10/10/2020 2000 10/09/2020 2000      TPN Central Line Formulation [[id number]] TPN Central Line Formulation [[id number]]    Order Status  Completed Completed    Last Admin  New Bag at 10/11/2020 1958 by Yaneli Zambrano R.N. New Bag at 10/10/2020 0024 by Kathy Islas R.N.       Base    Clinisol 15%  80 g 40 g    dextrose  185 g 125 g    fat emulsions 20%  35 g 10 g       Additives    potassium phosphate  15 mmol 15 mmol    potassium chloride  40 mEq 20 mEq    sodium acetate  154 mEq 154 mEq    sodium chloride  154 mEq 154 mEq    magnesium sulfate  12 mEq 16 mEq    calcium GLUConate  4.65 mEq 4.65 mEq    M.T.E. -5 Adult  1 mL 1 mL    M.V.I. Adult  10 mL 10 mL    famotidine  40 mg 40 mg    thiamine  -- 100 mg    folic acid  1 mg 1 mg       QS Base    sterile water for inj(pf)  850.71 mL 1,336.09 mL       Energy Contribution    Proteins  -- --    Dextrose  -- --    Lipids  -- --    Total  -- --       Electrolyte Ion Calculated Amount    Sodium  308 mEq 308 mEq    Potassium  62 mEq 42 mEq    Calcium  4.65 mEq 4.65 mEq    Magnesium  12.02 mEq 16 mEq    Aluminum  -- --    Phosphate  15 mmol 15 mmol    Chloride  194 mEq 174 mEq    Acetate  221.73 mEq 187.87 mEq       Other    Total Protein  80 g 40 g    Total Protein/kg  1.37 g/kg 0.71 g/kg    Total Amino Acid  -- --    Total Amino Acid/kg  -- --    Glucose Infusion Rate  2.13 mg/kg/min 1.54 mg/kg/min    Osmolarity (Estimated)  -- --    Volume  1,992 mL 1,992 mL    Rate  83 mL/hr 83 mL/hr    Dosing Weight  58.5 kg 56.3 kg    Infusion Site  [facility]            This formula provides:  % kcal as lipids = 27  Grams protein/kg = 1.45  Non-protein calories = 979  Kcals/kg = 24  Total daily calories = 1299    Comments:  1.   Macronutrients:  TPN providing 100% caloric needs. Clear liquid diet to advance to Full liquid diet today per MD.  2.   Micronutrients:  All electrolytes WNL;  will increase magnesium due to decreasing trend; no outside supplementation necessary at this time  3.   Glucose:  BG < 180 without insulin needs  4.   Fluids: TPN at 83mL/hr + MIVF at 42mL/hr.  Minimal drain output; PO diet advancing.  5.   Additional Comments: Plan to continue TPN until patient able to tolerate sustainable amounts of calories through PO diet. Follow LFT elevation; may require dextrose decrease if to remain on TPN through end of the week.    Ronaldo Overton, PharmD, BCPS

## 2020-10-12 NOTE — DOCUMENTATION QUERY
[facility]                                                                       Query Response Note      PATIENT:               DAVID MONTNAA  ACCT #:                  [id number]  MRN:                     [id number]  :                      1950  ADMIT DATE:       2020 6:30 AM  DISCH DATE:          RESPONDING  PROVIDER #:        [id number]           QUERY TEXT:    Colon perforation and peritonitis have developed in the postoperative period.  Can the relationship between these conditions and surgery be further specified?    NOTE:  If an appropriate response is not listed below, please respond with a new note.        The patient's Clinical Indicators include:  Per 10/8 Immediate Post Op Note: perforated cecum.  No anastomotic leak found, multiple areas of full thickness necrosis and perforation in the distended cecum.    Risk Factors: s/p colostomy reversal, lysis of adhesions   Treatment: right colectomy, Zosyn, fluconazole  Options provided:   -- Colon perforation and peritonitis are unexpected and a complication of the procedure performed   -- Colon perforation and peritonitis are not routinely expected, but integral/inherent to the procedure performed   -- Colon perforation and peritonitis are routinely expected and integral/inherent to the procedure performed   -- Colon perforation and peritonitis are related to another etiology, (please document under lying etiology)   -- Colon perforation and peritonitis are related to another etiology, (please document under lying etiology)   -- Colon perforation and peritonitis are incidental and without clinical significance   -- Unable to determine      Query created by: Georgina Almaraz on 10/12/2020 8:14 AM    RESPONSE TEXT:    Colon perforation and peritonitis are unexpected and a complication of the procedure performed          Electronically signed by:  LAUREN HAIRSTON MD 10/12/2020  8:53 AM

## 2020-10-12 NOTE — PROGRESS NOTES
OOB to chair most of AM, ambulated to bathroom and hallway, gait steady, martine well. C/O of nausea post initiation of full liquid diet, zofran iv given as ordered w/ relief. Ileostomy active w/ greeen liquid stool, voiding w/out problems. VSS, afebrile. Encr pulm hygiene/IS, O2 1L sats >94%. NOEL x3 in place w/ scant serosang drain.

## 2020-10-12 NOTE — THERAPY
Physical Therapy   Daily Treatment     Patient Name: Tamiko Cabrera  Age:  70 y.o., Sex:  female  Medical Record #: [id number]  Today's Date: 10/12/2020     Precautions: Fall Risk    Assessment    Reorders received, pt has been in house longer than expected but continues to ambulate when nsg available to supervise. Today, pt is supervised for transfers and ambulation x 500 feet with no AD with supervision. Pt needs to continue to walk 2-3x per day in hallway with nsg supervision. Pt has supportive sister at home who will assist as needed.   Plan    No further inpt PT needs.     DC Equipment Recommendations: None  Discharge Recommendations: Recommend home health for continued physical therapy services   Patient will not be actively followed for physical therapy services at this time, however may be seen if requested by physician for 1 more visit within 30 days to address any discharge or equipment needs       Objective       10/12/20 1057   Gait Analysis   Gait Level Of Assist Supervised   Assistive Device None   Distance (Feet) 500   Functional Mobility   Sit to Stand Supervised   Bed, Chair, Wheelchair Transfer Supervised   Toilet Transfers Supervised   Transfer Method Stand Pivot   Anticipated Discharge Equipment and Recommendations   DC Equipment Recommendations None   Discharge Recommendations Recommend home health for continued physical therapy services

## 2020-10-13 LAB
ALBUMIN SERPL BCP-MCNC: 1.8 G/DL (ref 3.2–4.9)
ALBUMIN/GLOB SERPL: 0.8 G/DL
ALP SERPL-CCNC: 122 U/L (ref 30–99)
ALT SERPL-CCNC: 24 U/L (ref 2–50)
ANION GAP SERPL CALC-SCNC: 5 MMOL/L (ref 7–16)
AST SERPL-CCNC: 10 U/L (ref 12–45)
BACTERIA WND AEROBE CULT: ABNORMAL
BASOPHILS # BLD AUTO: 0.6 % (ref 0–1.8)
BASOPHILS # BLD: 0.07 K/UL (ref 0–0.12)
BILIRUB SERPL-MCNC: 0.4 MG/DL (ref 0.1–1.5)
BUN SERPL-MCNC: 12 MG/DL (ref 8–22)
CALCIUM SERPL-MCNC: 8 MG/DL (ref 8.5–10.5)
CHLORIDE SERPL-SCNC: 102 MMOL/L (ref 96–112)
CO2 SERPL-SCNC: 28 MMOL/L (ref 20–33)
CREAT SERPL-MCNC: 0.35 MG/DL (ref 0.5–1.4)
EOSINOPHIL # BLD AUTO: 0.31 K/UL (ref 0–0.51)
EOSINOPHIL NFR BLD: 2.5 % (ref 0–6.9)
ERYTHROCYTE [DISTWIDTH] IN BLOOD BY AUTOMATED COUNT: 46.3 FL (ref 35.9–50)
GLOBULIN SER CALC-MCNC: 2.4 G/DL (ref 1.9–3.5)
GLUCOSE SERPL-MCNC: 145 MG/DL (ref 65–99)
GRAM STN SPEC: ABNORMAL
HCT VFR BLD AUTO: 25 % (ref 37–47)
HGB BLD-MCNC: 8 G/DL (ref 12–16)
IMM GRANULOCYTES # BLD AUTO: 0.34 K/UL (ref 0–0.11)
IMM GRANULOCYTES NFR BLD AUTO: 2.8 % (ref 0–0.9)
LYMPHOCYTES # BLD AUTO: 1.2 K/UL (ref 1–4.8)
LYMPHOCYTES NFR BLD: 9.8 % (ref 22–41)
MAGNESIUM SERPL-MCNC: 1.8 MG/DL (ref 1.5–2.5)
MCH RBC QN AUTO: 31.4 PG (ref 27–33)
MCHC RBC AUTO-ENTMCNC: 32 G/DL (ref 33.6–35)
MCV RBC AUTO: 98 FL (ref 81.4–97.8)
MONOCYTES # BLD AUTO: 1.49 K/UL (ref 0–0.85)
MONOCYTES NFR BLD AUTO: 12.2 % (ref 0–13.4)
NEUTROPHILS # BLD AUTO: 8.83 K/UL (ref 2–7.15)
NEUTROPHILS NFR BLD: 72.1 % (ref 44–72)
NRBC # BLD AUTO: 0 K/UL
NRBC BLD-RTO: 0 /100 WBC
PHOSPHATE SERPL-MCNC: 3.4 MG/DL (ref 2.5–4.5)
PLATELET # BLD AUTO: 415 K/UL (ref 164–446)
PMV BLD AUTO: 9.3 FL (ref 9–12.9)
POTASSIUM SERPL-SCNC: 3.9 MMOL/L (ref 3.6–5.5)
PROT SERPL-MCNC: 4.2 G/DL (ref 6–8.2)
RBC # BLD AUTO: 2.55 M/UL (ref 4.2–5.4)
SIGNIFICANT IND 70042: ABNORMAL
SITE SITE: ABNORMAL
SODIUM SERPL-SCNC: 135 MMOL/L (ref 135–145)
SOURCE SOURCE: ABNORMAL
WBC # BLD AUTO: 12.2 K/UL (ref 4.8–10.8)

## 2020-10-13 PROCEDURE — A9270 NON-COVERED ITEM OR SERVICE: HCPCS | Performed by: SURGERY

## 2020-10-13 PROCEDURE — 700105 HCHG RX REV CODE 258: Performed by: SURGERY

## 2020-10-13 PROCEDURE — 700111 HCHG RX REV CODE 636 W/ 250 OVERRIDE (IP): Performed by: SURGERY

## 2020-10-13 PROCEDURE — 85025 COMPLETE CBC W/AUTO DIFF WBC: CPT

## 2020-10-13 PROCEDURE — 80053 COMPREHEN METABOLIC PANEL: CPT

## 2020-10-13 PROCEDURE — 97168 OT RE-EVAL EST PLAN CARE: CPT

## 2020-10-13 PROCEDURE — 84100 ASSAY OF PHOSPHORUS: CPT

## 2020-10-13 PROCEDURE — 700102 HCHG RX REV CODE 250 W/ 637 OVERRIDE(OP): Performed by: SURGERY

## 2020-10-13 PROCEDURE — 770006 HCHG ROOM/CARE - MED/SURG/GYN SEMI*

## 2020-10-13 PROCEDURE — 83735 ASSAY OF MAGNESIUM: CPT

## 2020-10-13 RX ORDER — OXYCODONE HYDROCHLORIDE 5 MG/1
5 TABLET ORAL EVERY 4 HOURS PRN
Status: DISCONTINUED | OUTPATIENT
Start: 2020-10-13 | End: 2020-10-16 | Stop reason: HOSPADM

## 2020-10-13 RX ORDER — HYDROMORPHONE HYDROCHLORIDE 1 MG/ML
0.2 INJECTION, SOLUTION INTRAMUSCULAR; INTRAVENOUS; SUBCUTANEOUS
Status: DISCONTINUED | OUTPATIENT
Start: 2020-10-13 | End: 2020-10-16 | Stop reason: HOSPADM

## 2020-10-13 RX ADMIN — PILOCARPINE HYDROCHLORIDE 5 MG: 5 TABLET, FILM COATED ORAL at 04:42

## 2020-10-13 RX ADMIN — PILOCARPINE HYDROCHLORIDE 5 MG: 5 TABLET, FILM COATED ORAL at 13:52

## 2020-10-13 RX ADMIN — IPRATROPIUM BROMIDE 2 SPRAY: 21 SPRAY NASAL at 17:33

## 2020-10-13 RX ADMIN — SODIUM CHLORIDE, POTASSIUM CHLORIDE, SODIUM LACTATE AND CALCIUM CHLORIDE: 600; 310; 30; 20 INJECTION, SOLUTION INTRAVENOUS at 04:41

## 2020-10-13 RX ADMIN — PIPERACILLIN AND TAZOBACTAM 3.38 G: 3; .375 INJECTION, POWDER, LYOPHILIZED, FOR SOLUTION INTRAVENOUS; PARENTERAL at 13:54

## 2020-10-13 RX ADMIN — DORZOLAMIDE HYDROCHLORIDE 1 DROP: 20 SOLUTION/ DROPS OPHTHALMIC at 13:51

## 2020-10-13 RX ADMIN — FLUCONAZOLE 400 MG: 200 TABLET ORAL at 04:42

## 2020-10-13 RX ADMIN — ENOXAPARIN SODIUM 40 MG: 40 INJECTION SUBCUTANEOUS at 04:42

## 2020-10-13 RX ADMIN — OXYCODONE 5 MG: 5 TABLET ORAL at 19:32

## 2020-10-13 RX ADMIN — LATANOPROST 1 DROP: 50 SOLUTION OPHTHALMIC at 19:32

## 2020-10-13 RX ADMIN — ONDANSETRON 4 MG: 2 INJECTION INTRAMUSCULAR; INTRAVENOUS at 17:44

## 2020-10-13 RX ADMIN — DULOXETINE HYDROCHLORIDE 120 MG: 60 CAPSULE, DELAYED RELEASE ORAL at 04:42

## 2020-10-13 RX ADMIN — DORZOLAMIDE HYDROCHLORIDE 1 DROP: 20 SOLUTION/ DROPS OPHTHALMIC at 17:32

## 2020-10-13 RX ADMIN — ONDANSETRON 4 MG: 2 INJECTION INTRAMUSCULAR; INTRAVENOUS at 13:51

## 2020-10-13 RX ADMIN — TIMOLOL MALEATE 1 DROP: 5 SOLUTION/ DROPS OPHTHALMIC at 04:41

## 2020-10-13 RX ADMIN — TIMOLOL MALEATE 1 DROP: 5 SOLUTION/ DROPS OPHTHALMIC at 17:33

## 2020-10-13 RX ADMIN — DORZOLAMIDE HYDROCHLORIDE 1 DROP: 20 SOLUTION/ DROPS OPHTHALMIC at 04:43

## 2020-10-13 RX ADMIN — IPRATROPIUM BROMIDE 2 SPRAY: 21 SPRAY NASAL at 04:41

## 2020-10-13 RX ADMIN — PIPERACILLIN AND TAZOBACTAM 3.38 G: 3; .375 INJECTION, POWDER, LYOPHILIZED, FOR SOLUTION INTRAVENOUS; PARENTERAL at 20:01

## 2020-10-13 RX ADMIN — PIPERACILLIN AND TAZOBACTAM 3.38 G: 3; .375 INJECTION, POWDER, LYOPHILIZED, FOR SOLUTION INTRAVENOUS; PARENTERAL at 04:42

## 2020-10-13 RX ADMIN — TRAZODONE HYDROCHLORIDE 150 MG: 100 TABLET ORAL at 19:33

## 2020-10-13 RX ADMIN — PILOCARPINE HYDROCHLORIDE 5 MG: 5 TABLET, FILM COATED ORAL at 17:32

## 2020-10-13 ASSESSMENT — COGNITIVE AND FUNCTIONAL STATUS - GENERAL
DRESSING REGULAR UPPER BODY CLOTHING: A LITTLE
SUGGESTED CMS G CODE MODIFIER DAILY ACTIVITY: CK
DRESSING REGULAR LOWER BODY CLOTHING: A LOT
DAILY ACTIVITIY SCORE: 18
PERSONAL GROOMING: A LITTLE
HELP NEEDED FOR BATHING: A LITTLE
TOILETING: A LITTLE

## 2020-10-13 ASSESSMENT — PAIN DESCRIPTION - PAIN TYPE
TYPE: ACUTE PAIN;SURGICAL PAIN
TYPE: ACUTE PAIN
TYPE: ACUTE PAIN;SURGICAL PAIN

## 2020-10-13 ASSESSMENT — ACTIVITIES OF DAILY LIVING (ADL): TOILETING: INDEPENDENT

## 2020-10-13 ASSESSMENT — PAIN SCALES - WONG BAKER: WONGBAKER_NUMERICALRESPONSE: DOESN'T HURT AT ALL

## 2020-10-13 NOTE — CARE PLAN
Problem: Bowel/Gastric:  Goal: Normal bowel function is maintained or improved  Outcome: PROGRESSING AS EXPECTED  Note: Pt educated about ileostomy, + output, educated about diet     Problem: Knowledge Deficit  Goal: Knowledge of disease process/condition, treatment plan, diagnostic tests, and medications will improve  Outcome: PROGRESSING AS EXPECTED  Note: Updated on POC, educated about medications and labs

## 2020-10-13 NOTE — PROGRESS NOTES
Bedside report received. Assessment completed.   Pt is A&O x4. Pt on 1L NC.   Pain 3/10. Pt has Fentanyl PCA pump in use.   Denies nausea.   - numbness, - tingling.  Midline incision with prevena, CDI, no signs of air leak  x2 NOEL drains to R abd, serosanguinous output  LLQ ileostomy, + output  x1 NOEL drain above LLQ ileostomy, serosanguinous output  + void.   Full liquid diet. Tolerates well.   Pt up SBA. Tolerates well.   Call light within reach. All needs met at this time. Fall Precautions and hourly rounding in place.

## 2020-10-13 NOTE — DOCUMENTATION QUERY
[facility]                                                                       Query Response Note      PATIENT:               DAVID MONTANA  ACCT #:                  [id number]  MRN:                     [id number]  :                      1950  ADMIT DATE:       2020 6:30 AM  DISCH DATE:          RESPONDING  PROVIDER #:        [id number]           QUERY TEXT:    Pneumonia has developed in the postoperative period.  Can the relationship between pneumonia and surgery be further specified?     NOTE:  If an appropriate response is not listed below, please respond with a new note.        The patient's Clinical Indicators include:  Progress Notes 10/9-10/11 : Multilobar lung infiltrate, CXR: RUL opacity, consistent w/ pneumonia  10/8 CXR: RUL opacity, consistent w/ pneumonia   Risk Factors: s/p colostomy takedown , s/p right colectomy and ileostomy formation 10/8   Treatment: zosyn, aggressive pulmonary hygiene  Options provided:   -- Pneumonia is unexpected and a complication of the procedure performed   -- Pneumonia is not routinely expected, but integral/inherent to the procedure performed   -- Pneumonia is routinely expected and integral/inherent to the procedure performed   -- Pneumonia is related to another etiology, (please document under lying etiology)   -- Pneumonia is related to another etiology, (please document under lying etiology)   -- Pneumonia is incidental and without clinical significance   -- Unable to determine      Query created by: Georgina Almaraz on 10/12/2020 8:21 AM    RESPONSE TEXT:    condition is related to another etiology - please call me [phone] Dr. Oleary          Electronically signed by:  GIAN OLEARY MD 10/13/2020 9:57 AM

## 2020-10-13 NOTE — WOUND TEAM
"[facility]  Inpatient Services  New Ostomy Management & Teaching    HPI:  Reviewed   PMH: Reviewed   SH: Reviewed    Subjective: \"I had a pre-cut for the colostomy.\"     Objective: appliance intact, overlapping midline Prevena.  pervena starting to leak.  Communicated with shanna De La Vega to remove and leave incision [place].       Ileostomy 10/08/20 Standard (Megan, end) (Active)   Wound Image      10/13/20 1430   Stomal Appliance Assessment Intact    Stoma Assessment Pink    Stoma Shape Budded Greater Than One Inch    Stoma Size (in) 1.25    Peristomal Assessment Clean;Dry; wound to 0600    Mucocutaneous Junction Intact    Treatment Appliance Changed    Peristomal Protectant Hydrofiber Silver;Hydrocolloid;Paste Ring    Stomal Appliance Paste Ring, 2\";2 1/4\" (57mm) CTF    Output (mL) 500 mL    Output Color Green    WOUND RN ONLY - Stomal Appliance  2 Piece;2 1/4\" (57mm) CTF;Paste Ring, 2\"    Appliance (Pouch) # [id number]    Appliance Brand [place]    Appliance Supplier [employer]    Secure Start completed Yes    Ostomy Care Resources Provided UOAA Tip Sheet    WOUND NURSE ONLY - Time Spent with Patient (mins) 60       Supplies:  2 piece 2 1/4 with paste ring.  aquacel AG to open distal wound secured with hydrocolloid.        Interventions:  Patient observed all steps, states she used to use a pre-cut 1 5/8 for her previous colostomy.  Showed patient on card board cut out her size now vs 1 5/8 which would be too large.  Patient will need assistance at home due to peristomal wound.      Appliance carefully removed, Cleansed stoma and skin with moist wash cloth, dried. Applied. Placed small strip of Aquacel AG into FTW distal stoma, covered with piece of thin hydrocolloid. Cut barrier to fit, removed backing and applied MS to barrier opening. Applied barrier to skin and snapped on pouch, closed end.  Marked catalog.       Pt education:  Questions and concerns addressed, difficult to be hands on today; patient " hand strength not good.  May try moldable at next visit.     Educated patient on difference between ileostomy and colostomy and patient will need to consume more fluids and change appliance more frequently. Patient had a colostomy for the last 4 years, takedown was performed on 9/29/20, but had to be taken back to OR on 10/8 due to perforated cecum and placement of end ileo.     Will need reinforcement of ileostomy care and considerations with food/fluids and medications. Patient has preference for pre-cut, and likes the 2 piece rather than 1 piece that she has at home.  Catalog marked for CTF at this time until size not changing anymore.     Plan: Wound team to continue to follow for ostomy needs and teaching     Anticipated discharge needs: Supplies, supplier information, possible HH or outpatient ostomy clinic

## 2020-10-13 NOTE — PROGRESS NOTES
"10/13  S:  70 y.o.female s/p ExLap, R Colon, Ileostomy due to Perforation after a Katz's Reversal  POD# 5.  Patient doing well overnight, tolerating PO, having ileostomy output, working with PT/OT as ordered.  Pain is controlled at present and she denies any nausea or emesis.      O:  /70   Pulse 89   Temp 37.2 °C (98.9 °F) (Temporal)   Resp 18   Ht 1.626 m (5' 4.02\")   Wt 60.2 kg (132 lb 11.5 oz)   SpO2 96%   I/O last 3 completed shifts:  In: 2103.8 [P.O.:220; I.V.:1683.8]  Out: 3356 [Urine:1350; Drains:306]  Recent Labs     10/11/20  0600 10/12/20  0530 10/13/20  0210   SODIUM 139 135 135   POTASSIUM 3.9 3.9 3.9   CHLORIDE 104 99 102   CO2 31 28 28   GLUCOSE 135* 126* 145*   BUN 13 12 12   CREATININE 0.36* 0.30* 0.35*   CALCIUM 8.0* 8.0* 8.0*     Recent Labs     10/11/20  0600 10/12/20  0530 10/13/20  0210   WBC 10.9* 12.9* 12.2*   RBC 2.69* 2.89* 2.55*   HEMOGLOBIN 8.6* 9.2* 8.0*   HEMATOCRIT 26.1* 28.1* 25.0*   MCV 97.0 97.2 98.0*   MCH 32.0 31.8 31.4   MCHC 33.0* 32.7* 32.0*   RDW 46.2 46.2 46.3   PLATELETCT 405 428 415   MPV 9.6 9.4 9.3       Alert and Oriented x3, No Acute Distress  Normal Respiratory Effort  Abdomen soft, appropriately tender  Incisions/Bandages clean/dry/intact  Extremities warm and well perfused  Ostomy pink and healthy, output semiformed stool  NOEL Output Serosanguinous- 60/38/80    A/P:  Pain control with PO meds, stop PCA and start weaning narcotics  Diet as tolerated  Fluids wean off TPN then stop  Ambulate tid and ad brandee  Remove NOEL #2 and NOEL #1 today  On IV Abx for 7 days total, day 5/7    "

## 2020-10-13 NOTE — CARE PLAN
Problem: Pain Management  Goal: Pain level will decrease to patient's comfort goal  Outcome: PROGRESSING AS EXPECTED     Problem: Urinary Elimination:  Goal: Ability to reestablish a normal urinary elimination pattern will improve  Outcome: PROGRESSING AS EXPECTED     Problem: Communication  Goal: The ability to communicate needs accurately and effectively will improve  Outcome: PROGRESSING AS EXPECTED     Problem: Bowel/Gastric:  Goal: Normal bowel function is maintained or improved  Outcome: PROGRESSING AS EXPECTED  Intervention: Educate patient and significant other/support system about diet, fluid intake, medications and activity to promote bowel function  Note: Ileostomy llq. Good output

## 2020-10-13 NOTE — THERAPY
"Occupational Therapy   Re-Evaluation     Patient Name: Tamiko Cabrera  Age:  70 y.o., Sex:  female  Medical Record #: [id number]  Today's Date: 10/13/2020     Precautions  Precautions: Fall Risk  Comments: abdominal precautions s/p colostomy reversal    Assessment  Pt seen at request of physician for re-evaluation d/t pt being in house longer than expected. Pt verbalized recall of initial OT evaluation and rec for 3-in-1 commode. Pt completed functional mobility at supervision with no AD, however this session pt unable to complete LB dressing d/t abdominal pain. Pt would benefit from 1-2 additional OT visits prior to d/c to teach LB dressing with AE.    Plan    Recommend Occupational Therapy 1-2x prior to d/c for the following treatments:  Adaptive Equipment and Self Care/Activities of Daily Living.    DC Equipment Recommendations: Bed Side Commode(to put over toilet, LB dressing AE)  Discharge Recommendations: Anticipate that the patient will have no further occupational therapy needs after discharge from the hospital     Subjective    \"Yes I want to learn to use [AE]\"     Objective       10/13/20 1612   Prior Living Situation   Prior Services None   Housing / Facility 1 Story House   Steps Into Home 3   Steps In Home 0   Rail None   Bathroom Set up Bathtub / Shower Combination;Tub Transfer Bench  (comfort height toilet)   Equipment Owned 4-Wheel Walker;Tub Transfer Bench  (handheld shower head)   Lives with - Patient's Self Care Capacity Other (Comments)  (sister)   Comments Pt reports sister is able to provide 24/7 assist for ADLs/IADLs   Prior Level of ADL Function   Self Feeding Independent   Grooming / Hygiene Independent   Bathing Independent   Dressing Independent   Toileting Independent   Prior Level of IADL Function   Medication Management Independent   Laundry Independent   Kitchen Mobility Independent   Finances Independent   Home Management Independent   Shopping Independent   Prior Level Of Mobility " Independent Without Device in Community;Independent Without Device in Home   History of Falls   History of Falls No   Date of Last Fall   (pt denies falls)   Vitals   O2 (LPM) 2   O2 Delivery Device Nasal Cannula   Pain 0 - 10 Group   Location Abdomen   Therapist Pain Assessment During Activity  (not rated, limited indep with LB dressing, pt needs AE)   Cognition    Cognition / Consciousness WDL   Level of Consciousness Alert   Comments pleasant and cooperative   Balance Assessment   Sitting Balance (Static) Fair   Sitting Balance (Dynamic) Fair   Standing Balance (Static) Fair   Standing Balance (Dynamic) Fair   Weight Shift Sitting Good   Weight Shift Standing Fair   Comments no AD   Bed Mobility    Supine to Sit Supervised   Sit to Supine Supervised   Scooting Supervised   Comments HOB elevated, use of bed rail, pt reports she has an electric b3ed at home   ADL Assessment   Lower Body Dressing Maximal Assist  (unable to raise foot to opposite knee d/t abdominal pain)   Toileting Supervision  (simulated)   Comments Pt receptive to learning to use AE for LB dressing   Functional Mobility   Sit to Stand Supervised   Bed, Chair, Wheelchair Transfer Supervised   Toilet Transfers Supervised   Transfer Method Stand Step  (no AD)   Mobility bed>toilet>bed   Activity Tolerance   Sitting Edge of Bed 8 mins   Standing 4 mins   Patient / Family Goals   Patient / Family Goal #1 to go home   Short Term Goals   Short Term Goal # 1 Pt will complete LB dressing at supervision with use of AE x 2 sessions   Anticipated Discharge Equipment and Recommendations   DC Equipment Recommendations Bed Side Commode  (to put over toilet, LB dressing AE)   Discharge Recommendations Anticipate that the patient will have no further occupational therapy needs after discharge from the hospital

## 2020-10-14 LAB
ALBUMIN SERPL BCP-MCNC: 1.9 G/DL (ref 3.2–4.9)
ALBUMIN/GLOB SERPL: 0.8 G/DL
ALP SERPL-CCNC: 120 U/L (ref 30–99)
ALT SERPL-CCNC: 18 U/L (ref 2–50)
ANION GAP SERPL CALC-SCNC: 5 MMOL/L (ref 7–16)
AST SERPL-CCNC: 11 U/L (ref 12–45)
BASOPHILS # BLD AUTO: 0.7 % (ref 0–1.8)
BASOPHILS # BLD: 0.08 K/UL (ref 0–0.12)
BILIRUB SERPL-MCNC: 0.4 MG/DL (ref 0.1–1.5)
BUN SERPL-MCNC: 9 MG/DL (ref 8–22)
CALCIUM SERPL-MCNC: 8.3 MG/DL (ref 8.5–10.5)
CHLORIDE SERPL-SCNC: 104 MMOL/L (ref 96–112)
CO2 SERPL-SCNC: 28 MMOL/L (ref 20–33)
CREAT SERPL-MCNC: 0.38 MG/DL (ref 0.5–1.4)
EOSINOPHIL # BLD AUTO: 0.41 K/UL (ref 0–0.51)
EOSINOPHIL NFR BLD: 3.4 % (ref 0–6.9)
ERYTHROCYTE [DISTWIDTH] IN BLOOD BY AUTOMATED COUNT: 46.6 FL (ref 35.9–50)
GLOBULIN SER CALC-MCNC: 2.5 G/DL (ref 1.9–3.5)
GLUCOSE SERPL-MCNC: 110 MG/DL (ref 65–99)
HCT VFR BLD AUTO: 24.3 % (ref 37–47)
HGB BLD-MCNC: 7.9 G/DL (ref 12–16)
IMM GRANULOCYTES # BLD AUTO: 0.42 K/UL (ref 0–0.11)
IMM GRANULOCYTES NFR BLD AUTO: 3.5 % (ref 0–0.9)
LYMPHOCYTES # BLD AUTO: 1.47 K/UL (ref 1–4.8)
LYMPHOCYTES NFR BLD: 12.3 % (ref 22–41)
MAGNESIUM SERPL-MCNC: 1.8 MG/DL (ref 1.5–2.5)
MCH RBC QN AUTO: 32.2 PG (ref 27–33)
MCHC RBC AUTO-ENTMCNC: 32.5 G/DL (ref 33.6–35)
MCV RBC AUTO: 99.2 FL (ref 81.4–97.8)
MONOCYTES # BLD AUTO: 1.47 K/UL (ref 0–0.85)
MONOCYTES NFR BLD AUTO: 12.3 % (ref 0–13.4)
NEUTROPHILS # BLD AUTO: 8.13 K/UL (ref 2–7.15)
NEUTROPHILS NFR BLD: 67.8 % (ref 44–72)
NRBC # BLD AUTO: 0 K/UL
NRBC BLD-RTO: 0 /100 WBC
PHOSPHATE SERPL-MCNC: 3.4 MG/DL (ref 2.5–4.5)
PLATELET # BLD AUTO: 396 K/UL (ref 164–446)
PMV BLD AUTO: 8.8 FL (ref 9–12.9)
POTASSIUM SERPL-SCNC: 4.2 MMOL/L (ref 3.6–5.5)
PROT SERPL-MCNC: 4.4 G/DL (ref 6–8.2)
RBC # BLD AUTO: 2.45 M/UL (ref 4.2–5.4)
SODIUM SERPL-SCNC: 137 MMOL/L (ref 135–145)
WBC # BLD AUTO: 12 K/UL (ref 4.8–10.8)

## 2020-10-14 PROCEDURE — 700105 HCHG RX REV CODE 258: Performed by: SURGERY

## 2020-10-14 PROCEDURE — A9270 NON-COVERED ITEM OR SERVICE: HCPCS | Performed by: SURGERY

## 2020-10-14 PROCEDURE — 80053 COMPREHEN METABOLIC PANEL: CPT

## 2020-10-14 PROCEDURE — 83735 ASSAY OF MAGNESIUM: CPT

## 2020-10-14 PROCEDURE — 700102 HCHG RX REV CODE 250 W/ 637 OVERRIDE(OP): Performed by: SURGERY

## 2020-10-14 PROCEDURE — 85025 COMPLETE CBC W/AUTO DIFF WBC: CPT

## 2020-10-14 PROCEDURE — 700111 HCHG RX REV CODE 636 W/ 250 OVERRIDE (IP): Performed by: SURGERY

## 2020-10-14 PROCEDURE — 84100 ASSAY OF PHOSPHORUS: CPT

## 2020-10-14 PROCEDURE — 770006 HCHG ROOM/CARE - MED/SURG/GYN SEMI*

## 2020-10-14 RX ADMIN — OXYCODONE 5 MG: 5 TABLET ORAL at 13:36

## 2020-10-14 RX ADMIN — PILOCARPINE HYDROCHLORIDE 5 MG: 5 TABLET, FILM COATED ORAL at 04:01

## 2020-10-14 RX ADMIN — OXYCODONE 5 MG: 5 TABLET ORAL at 04:17

## 2020-10-14 RX ADMIN — PIPERACILLIN AND TAZOBACTAM 3.38 G: 3; .375 INJECTION, POWDER, LYOPHILIZED, FOR SOLUTION INTRAVENOUS; PARENTERAL at 12:05

## 2020-10-14 RX ADMIN — OXYCODONE 5 MG: 5 TABLET ORAL at 22:08

## 2020-10-14 RX ADMIN — OXYCODONE 5 MG: 5 TABLET ORAL at 17:53

## 2020-10-14 RX ADMIN — HYDROMORPHONE HYDROCHLORIDE 0.2 MG: 1 INJECTION, SOLUTION INTRAMUSCULAR; INTRAVENOUS; SUBCUTANEOUS at 23:32

## 2020-10-14 RX ADMIN — HYDROMORPHONE HYDROCHLORIDE 0.2 MG: 1 INJECTION, SOLUTION INTRAMUSCULAR; INTRAVENOUS; SUBCUTANEOUS at 14:51

## 2020-10-14 RX ADMIN — ENOXAPARIN SODIUM 40 MG: 40 INJECTION SUBCUTANEOUS at 04:01

## 2020-10-14 RX ADMIN — TIMOLOL MALEATE 1 DROP: 5 SOLUTION/ DROPS OPHTHALMIC at 17:53

## 2020-10-14 RX ADMIN — TIMOLOL MALEATE 1 DROP: 5 SOLUTION/ DROPS OPHTHALMIC at 04:01

## 2020-10-14 RX ADMIN — PIPERACILLIN AND TAZOBACTAM 3.38 G: 3; .375 INJECTION, POWDER, LYOPHILIZED, FOR SOLUTION INTRAVENOUS; PARENTERAL at 04:01

## 2020-10-14 RX ADMIN — OXYCODONE 5 MG: 5 TABLET ORAL at 08:30

## 2020-10-14 RX ADMIN — TRAZODONE HYDROCHLORIDE 150 MG: 100 TABLET ORAL at 20:23

## 2020-10-14 RX ADMIN — DORZOLAMIDE HYDROCHLORIDE 1 DROP: 20 SOLUTION/ DROPS OPHTHALMIC at 17:53

## 2020-10-14 RX ADMIN — PIPERACILLIN AND TAZOBACTAM 3.38 G: 3; .375 INJECTION, POWDER, LYOPHILIZED, FOR SOLUTION INTRAVENOUS; PARENTERAL at 20:23

## 2020-10-14 RX ADMIN — DORZOLAMIDE HYDROCHLORIDE 1 DROP: 20 SOLUTION/ DROPS OPHTHALMIC at 04:01

## 2020-10-14 RX ADMIN — DULOXETINE HYDROCHLORIDE 120 MG: 60 CAPSULE, DELAYED RELEASE ORAL at 04:01

## 2020-10-14 RX ADMIN — IPRATROPIUM BROMIDE 2 SPRAY: 21 SPRAY NASAL at 17:53

## 2020-10-14 RX ADMIN — IPRATROPIUM BROMIDE 2 SPRAY: 21 SPRAY NASAL at 04:01

## 2020-10-14 RX ADMIN — DORZOLAMIDE HYDROCHLORIDE 1 DROP: 20 SOLUTION/ DROPS OPHTHALMIC at 12:05

## 2020-10-14 RX ADMIN — FLUCONAZOLE 400 MG: 200 TABLET ORAL at 04:02

## 2020-10-14 RX ADMIN — OXYCODONE 5 MG: 5 TABLET ORAL at 00:17

## 2020-10-14 RX ADMIN — LATANOPROST 1 DROP: 50 SOLUTION OPHTHALMIC at 20:23

## 2020-10-14 RX ADMIN — PILOCARPINE HYDROCHLORIDE 5 MG: 5 TABLET, FILM COATED ORAL at 12:05

## 2020-10-14 ASSESSMENT — PAIN DESCRIPTION - PAIN TYPE
TYPE: ACUTE PAIN
TYPE: ACUTE PAIN;SURGICAL PAIN
TYPE: ACUTE PAIN
TYPE: ACUTE PAIN;SURGICAL PAIN
TYPE: ACUTE PAIN

## 2020-10-14 NOTE — CARE PLAN
Problem: Pain Management  Goal: Pain level will decrease to patient's comfort goal  Outcome: PROGRESSING AS EXPECTED     Problem: Urinary Elimination:  Goal: Ability to reestablish a normal urinary elimination pattern will improve  Outcome: PROGRESSING AS EXPECTED     Problem: Communication  Goal: The ability to communicate needs accurately and effectively will improve  Outcome: PROGRESSING AS EXPECTED     Problem: Safety  Goal: Will remain free from injury  Outcome: PROGRESSING AS EXPECTED

## 2020-10-14 NOTE — CARE PLAN
Problem: Bowel/Gastric:  Goal: Normal bowel function is maintained or improved  Outcome: PROGRESSING AS EXPECTED  Note: + output through ileostomy, tolerating diet  Problem: Knowledge Deficit  Goal: Knowledge of disease process/condition, treatment plan, diagnostic tests, and medications will improve  Outcome: PROGRESSING AS EXPECTED  Note: Pt educated about medications, updated on labs, drains, diet order

## 2020-10-14 NOTE — DIETARY
"NUTRITION SERVICES: UPDATE  Pt transitioned from TPN to PO diet on 10/14. Per MD note yesterday: Plan was to \"wean off TPN then stop.\" Current diet: full liquid. PO intake per chart review % x 2 meals.    RD following.    "

## 2020-10-14 NOTE — PROGRESS NOTES
"10/14  S:  70 y.o.female s/p ExLap, R Colon, Ileostomy due to Perforation after a Katz's Reversal  POD# 6.  Patient doing well overnight, tolerating PO, having ileostomy output, working with PT/OT.  She is off PCA and getting oral meds only for pain at this time.      O:  /75   Pulse 82   Temp 37.2 °C (98.9 °F) (Temporal)   Resp 17   Ht 1.626 m (5' 4.02\")   Wt 60.2 kg (132 lb 11.5 oz)   SpO2 98%   I/O last 3 completed shifts:  In: 2484.6 [P.O.:1370; I.V.:914.6]  Out: 3280 [Urine:1100; Drains:205]  Recent Labs     10/12/20  0530 10/13/20  0210 10/14/20  0355   SODIUM 135 135 137   POTASSIUM 3.9 3.9 4.2   CHLORIDE 99 102 104   CO2 28 28 28   GLUCOSE 126* 145* 110*   BUN 12 12 9   CREATININE 0.30* 0.35* 0.38*   CALCIUM 8.0* 8.0* 8.3*     Recent Labs     10/12/20  0530 10/13/20  0210 10/14/20  0355   WBC 12.9* 12.2* 12.0*   RBC 2.89* 2.55* 2.45*   HEMOGLOBIN 9.2* 8.0* 7.9*   HEMATOCRIT 28.1* 25.0* 24.3*   MCV 97.2 98.0* 99.2*   MCH 31.8 31.4 32.2   MCHC 32.7* 32.0* 32.5*   RDW 46.2 46.3 46.6   PLATELETCT 428 415 396   MPV 9.4 9.3 8.8*       Alert and Oriented x3, No Acute Distress  Normal Respiratory Effort  Abdomen soft, appropriately tender  Incisions/Bandages clean/dry/intact  Extremities warm and well perfused  Ostomy pink and healthy, output semiformed stool  NOEL Output Serosanguinous- 40cc     A/P:  Pain control with PO meds, minimize narcotics as much as possible  Diet as tolerated  Fluids wean off TPN then stop  Ambulate tid and ad brandee  On IV Abx for 7 days total, day 6/7    "

## 2020-10-14 NOTE — PROGRESS NOTES
Bedside report received  Assessment completed.   A&O x4Pt   1L NC  Pain 5/10 however sleeping very comfortably throughout shift. PRN oxycodone given, pain well managed. PRN dilaudid ordered for breakthrough.  Denies n/v  No numbness/tingling  Midline incision CDI  NOEL drain to abdomen, ileostomy to llq  + void. Ambulating frequently standby assist  Full liquid diet. Tolerates well.   Call light within reach   Fall Precautions and hourly rounding in place.

## 2020-10-14 NOTE — PROGRESS NOTES
Bedside report received. Assessment completed.   Pt is A&O x4. Pt on room air  Pain 7/10. Medicating PRN per MAR  Denies nausea.   - numbness, - tingling.  Midline incision well approximated with staples, PARIS.   NOEL drain to RLQ, serosanguinous output  LLQ ileostomy, + output  + void.   Full liquid diet. Tolerates well.   Pt up SBA. Tolerates well.   Call light within reach. All needs met at this time. Fall Precautions and hourly rounding in place.

## 2020-10-14 NOTE — CARE PLAN
Problem: Nutritional:  Goal: Achieve adequate nutritional intake  Description: Diet will advance >NPO/clear liquid diet. Patient will consume >50% of meals.   Outcome: PROGRESSING AS EXPECTED    Diet order is full liquid. Will continue to monitor pt for adequate PO intake.     Problem: Nutritional:  Goal: Nutrition support tolerated and meeting greater than 85% of estimated needs  Outcome: DISCHARGED-GOAL NOT MET     TPN discontinued.

## 2020-10-15 PROCEDURE — A9270 NON-COVERED ITEM OR SERVICE: HCPCS | Performed by: SURGERY

## 2020-10-15 PROCEDURE — 700102 HCHG RX REV CODE 250 W/ 637 OVERRIDE(OP): Performed by: SURGERY

## 2020-10-15 PROCEDURE — 770006 HCHG ROOM/CARE - MED/SURG/GYN SEMI*

## 2020-10-15 PROCEDURE — 700105 HCHG RX REV CODE 258: Performed by: SURGERY

## 2020-10-15 PROCEDURE — 700111 HCHG RX REV CODE 636 W/ 250 OVERRIDE (IP): Performed by: SURGERY

## 2020-10-15 RX ADMIN — ENOXAPARIN SODIUM 40 MG: 40 INJECTION SUBCUTANEOUS at 04:41

## 2020-10-15 RX ADMIN — OXYCODONE 5 MG: 5 TABLET ORAL at 11:54

## 2020-10-15 RX ADMIN — DORZOLAMIDE HYDROCHLORIDE 1 DROP: 20 SOLUTION/ DROPS OPHTHALMIC at 04:44

## 2020-10-15 RX ADMIN — DULOXETINE HYDROCHLORIDE 120 MG: 60 CAPSULE, DELAYED RELEASE ORAL at 04:41

## 2020-10-15 RX ADMIN — PILOCARPINE HYDROCHLORIDE 5 MG: 5 TABLET, FILM COATED ORAL at 20:27

## 2020-10-15 RX ADMIN — OXYCODONE 5 MG: 5 TABLET ORAL at 07:32

## 2020-10-15 RX ADMIN — OXYCODONE 5 MG: 5 TABLET ORAL at 16:05

## 2020-10-15 RX ADMIN — FLUCONAZOLE 400 MG: 200 TABLET ORAL at 04:41

## 2020-10-15 RX ADMIN — DORZOLAMIDE HYDROCHLORIDE 1 DROP: 20 SOLUTION/ DROPS OPHTHALMIC at 12:01

## 2020-10-15 RX ADMIN — PILOCARPINE HYDROCHLORIDE 5 MG: 5 TABLET, FILM COATED ORAL at 04:44

## 2020-10-15 RX ADMIN — IPRATROPIUM BROMIDE 2 SPRAY: 21 SPRAY NASAL at 18:30

## 2020-10-15 RX ADMIN — DORZOLAMIDE HYDROCHLORIDE 1 DROP: 20 SOLUTION/ DROPS OPHTHALMIC at 18:30

## 2020-10-15 RX ADMIN — IPRATROPIUM BROMIDE 2 SPRAY: 21 SPRAY NASAL at 04:43

## 2020-10-15 RX ADMIN — TIMOLOL MALEATE 1 DROP: 5 SOLUTION/ DROPS OPHTHALMIC at 04:43

## 2020-10-15 RX ADMIN — PIPERACILLIN AND TAZOBACTAM 3.38 G: 3; .375 INJECTION, POWDER, LYOPHILIZED, FOR SOLUTION INTRAVENOUS; PARENTERAL at 21:51

## 2020-10-15 RX ADMIN — PIPERACILLIN AND TAZOBACTAM 3.38 G: 3; .375 INJECTION, POWDER, LYOPHILIZED, FOR SOLUTION INTRAVENOUS; PARENTERAL at 11:54

## 2020-10-15 RX ADMIN — PILOCARPINE HYDROCHLORIDE 5 MG: 5 TABLET, FILM COATED ORAL at 12:01

## 2020-10-15 RX ADMIN — PIPERACILLIN AND TAZOBACTAM 3.38 G: 3; .375 INJECTION, POWDER, LYOPHILIZED, FOR SOLUTION INTRAVENOUS; PARENTERAL at 04:41

## 2020-10-15 RX ADMIN — LATANOPROST 1 DROP: 50 SOLUTION OPHTHALMIC at 21:50

## 2020-10-15 RX ADMIN — TIMOLOL MALEATE 1 DROP: 5 SOLUTION/ DROPS OPHTHALMIC at 18:30

## 2020-10-15 RX ADMIN — TRAZODONE HYDROCHLORIDE 150 MG: 100 TABLET ORAL at 21:50

## 2020-10-15 RX ADMIN — OXYCODONE 5 MG: 5 TABLET ORAL at 20:27

## 2020-10-15 RX ADMIN — OXYCODONE 5 MG: 5 TABLET ORAL at 03:19

## 2020-10-15 ASSESSMENT — PAIN DESCRIPTION - PAIN TYPE
TYPE: ACUTE PAIN
TYPE: ACUTE PAIN;SURGICAL PAIN
TYPE: ACUTE PAIN
TYPE: ACUTE PAIN;SURGICAL PAIN
TYPE: ACUTE PAIN
TYPE: ACUTE PAIN

## 2020-10-15 NOTE — CARE PLAN
Problem: Pain Management  Goal: Pain level will decrease to patient's comfort goal  Outcome: PROGRESSING AS EXPECTED  Intervention: Follow pain managment plan developed in collaboration with patient and Interdisciplinary Team  Note: Prn oxycodone given. Resting comfortably     Problem: Urinary Elimination:  Goal: Ability to reestablish a normal urinary elimination pattern will improve  Outcome: PROGRESSING AS EXPECTED     Problem: Communication  Goal: The ability to communicate needs accurately and effectively will improve  Outcome: PROGRESSING AS EXPECTED     Problem: Safety  Goal: Will remain free from injury  Outcome: PROGRESSING AS EXPECTED

## 2020-10-15 NOTE — CARE PLAN
Problem: Bowel/Gastric:  Goal: Normal bowel function is maintained or improved  Outcome: PROGRESSING AS EXPECTED  Note: + ileostomy output, tolerating diet, good oral intake of water     Problem: Knowledge Deficit  Goal: Knowledge of disease process/condition, treatment plan, diagnostic tests, and medications will improve  Outcome: PROGRESSING AS EXPECTED  Note: Updated on plan of care, educated about diet, medications, labs

## 2020-10-15 NOTE — DISCHARGE PLANNING
Received Choice form at 0943  Agency/Facility Name: [facility]  Referral sent per Choice form @ 9212     Agency/Facility Name: [facility]  Spoke to: Sharmin  Outcome: Pending Review    @4327  Agency/Facility Name: [facility]  Outcome: Left a VM for Sharmin. Requested a call back

## 2020-10-15 NOTE — DISCHARGE PLANNING
Agency[facility]Facility Name: [facility]   Spoke To: Sharmin   Outcome: Faxed H&P per request.     @0094  Agency/Facility Name: [facility]   Outcome: Received voicemail from Sharmin stating referral is accepted.     [facility] returned call and left voicemail for Sharmin notifying pts expected discharge is tomorrow.

## 2020-10-15 NOTE — DISCHARGE PLANNING
Anticipated Discharge Disposition: Home with C.    Action: LSW received the F2F for HHC. LSW met with the patient at bedside, her choice is [facility], choice form provided to [facility].    LSW sent a [place] Text to Dr. Boyce requesting the order for HHC.    Barriers to Discharge: Medical Clearance and HHC Order.    Plan: Home with HHC, pending medical clearance and the order for HHC.    12:00pm - HHC Order received, [facility] aware. Per [facility], [facility] has accepted the referral.

## 2020-10-15 NOTE — FACE TO FACE
Face to Face Supporting Documentation - Home Health    The encounter with this patient was in whole or in part the primary reason for home health admission.    Date of encounter:   Patient:                    MRN:                       YOB: 2020  Tamiko Cabrera  [id number]  1950     Home health to see patient for:  Wound Care    Skilled need for:  Surgical Aftercare Surgical wounds and Ileostomy    Skilled nursing interventions to include:  Wound Care    Homebound status evidenced by:  Needs the assistance of another person in order to leave the home or Have a condition such that leaving his or her home is medically contraindicated. Leaving home requires a considerable and taxing effort. There is a normal inability to leave the home.    Community Physician to provide follow up care: Pro Vila M.D.     Optional Interventions? No      I certify the face to face encounter for this home health care referral meets the CMS requirements and the encounter/clinical assessment with the patient was, in whole, or in part, for the medical condition(s) listed above, which is the primary reason for home health care. Based on my clinical findings: the service(s) are medically necessary, support the need for home health care, and the homebound criteria are met.  I certify that this patient has had a face to face encounter by myself.  Kenn Boyce M.D. - NPI: [id number]

## 2020-10-15 NOTE — PROGRESS NOTES
Bedside report received. Assessment completed.   Pt is A&O x4. Pt on room air  Pain 7/10. Medicating PRN per MAR  Denies nausea.   - numbness, - tingling.  Midline incision well approximated with staples, PARIS.   NOEL drain to RLQ, serosanguinous output  LLQ ileostomy, + output. Pt is self care with ostomy.  + void.   GI soft diet. Tolerates well.   Pt up SBA. Tolerates well.   Call light within reach. All needs met at this time. Fall Precautions and hourly rounding in place.

## 2020-10-15 NOTE — WOUND TEAM
"[facility]  Inpatient Services  New Ostomy Management & Teaching    HPI:  Reviewed   PMH: Reviewed   SH: Reviewed    Subjective: \"So when should I change it?\"    Objective: previous appliance intact, patient hoping to go home tomorrow.  Off PCA and TPN.       Ileostomy 10/08/20 Standard (Megan, end) (Active)   Wound Image   10/13/20 1015   Stomal Appliance Assessment Intact;Changed    Stoma Assessment Red    Stoma Shape Budded Less Than One Inch;Round    Stoma Size (in) 1.25    Peristomal Assessment Wound    Mucocutaneous Junction Intact    Treatment Appliance Changed;Site care    Peristomal Protectant Hydrofiber Silver;Hydrocolloid    Stomal Appliance Paste Ring, 2\"    Output (mL) 0 mL    Output Color Brown    WOUND RN ONLY - Stomal Appliance  2 Piece;Paste Ring, 2\";2 1/4\" Moldable    Appliance (Pouch) # [id number]    Appliance Brand [employer]    Appliance Supplier [employer]    Secure Start completed Yes    Ostomy Care Resources Provided [facility] Tip Sheet    WOUND NURSE ONLY - Time Spent with Patient (mins) 60        Supplies:  2 piece 2 1/4 with paste ring and moldable barrier as patient wanted to try moldable vs CTF.  aquacel AG to open distal wound secured with hydrocolloid.        Interventions:  Patient preformed most steps on own.  states she used to use a pre-cut 1 5/8 for her previous colostomy.  Showed patient on card board cut out her size now vs 1 5/8 which would be too large.  Patient will need assistance at home due to peristomal wound.      Appliance carefully removed by patient, patient cleansed stoma and skin with moist wash cloth, dried. Showed patient how to use forma flex barrier, patient finished rolling to appropriate size.  Patient stretched and was assisted to apply paste ring to barrier.  Ute-stomal wound cleansed and filled with cut strip of aquacel AG, secured with hydrocolloid thin.  Assisted her to apply barrier to skin.  Started to attach pouch and patient finished and closed " end.  Marked catalog yesterday, went over with patient.        Pt education:  Questions and concerns addressed, difficult to be hands on today; patient hand strength not good.  May try moldable at next visit.     Educated patient on difference between ileostomy and colostomy and patient will need to consume more fluids and change appliance more frequently. Patient had a colostomy for the last 4 years, takedown was performed on 9/29/20, but had to be taken back to OR on 10/8 due to perforated cecum and placement of end ileo.     Plan: Wound team to continue to follow for ostomy needs and teaching     Anticipated discharge needs: Supplies, supplier information, possible HH or outpatient ostomy clinic

## 2020-10-15 NOTE — PROGRESS NOTES
"10/15  S:  70 y.o.female s/p ExLap, R Colon, ileostomy due to perforation after a Katz's Reversal  POD# 7.  Patient doing well overnight, tolerating solids, ambulating, comfortable with ostomy appliance.  Last day of IV Abx today.  Wants to go home on discharge if possible, lives with sister and amenable to HH visits.      O:  /61   Pulse 82   Temp 37.4 °C (99.4 °F) (Temporal)   Resp 18   Ht 1.626 m (5' 4.02\")   Wt 60.2 kg (132 lb 11.5 oz)   SpO2 95%   I/O last 3 completed shifts:  In: 1710 [P.O.:1610]  Out: 1130 [Drains:30]  Recent Labs     10/13/20  0210 10/14/20  0355   SODIUM 135 137   POTASSIUM 3.9 4.2   CHLORIDE 102 104   CO2 28 28   GLUCOSE 145* 110*   BUN 12 9   CREATININE 0.35* 0.38*   CALCIUM 8.0* 8.3*     Recent Labs     10/13/20  0210 10/14/20  0355   WBC 12.2* 12.0*   RBC 2.55* 2.45*   HEMOGLOBIN 8.0* 7.9*   HEMATOCRIT 25.0* 24.3*   MCV 98.0* 99.2*   MCH 31.4 32.2   MCHC 32.0* 32.5*   RDW 46.3 46.6   PLATELETCT 415 396   MPV 9.3 8.8*       Alert and Oriented x3, No Acute Distress  Normal Respiratory Effort  Abdomen soft, appropriately tender  Incisions/Bandages clean/dry/intact  Extremities warm and well perfused  Ostomy pink and healthy, output 500cc semisolid stool  NOEL Output Serosanguinous-30cc    A/P:  Pain control with PO meds, minimize narcotics as much as possible  Diet as tolerated  SLIV  Ambulate tid and ad brandee  On IV Abx for 7 days total, day 7/7  HH arrangements  "

## 2020-10-16 VITALS
OXYGEN SATURATION: 100 % | SYSTOLIC BLOOD PRESSURE: 116 MMHG | TEMPERATURE: 98.9 F | RESPIRATION RATE: 16 BRPM | HEART RATE: 88 BPM | BODY MASS INDEX: 22.66 KG/M2 | DIASTOLIC BLOOD PRESSURE: 75 MMHG | HEIGHT: 64 IN | WEIGHT: 132.72 LBS

## 2020-10-16 PROCEDURE — 700102 HCHG RX REV CODE 250 W/ 637 OVERRIDE(OP): Performed by: SURGERY

## 2020-10-16 PROCEDURE — 700111 HCHG RX REV CODE 636 W/ 250 OVERRIDE (IP): Performed by: SURGERY

## 2020-10-16 PROCEDURE — A9270 NON-COVERED ITEM OR SERVICE: HCPCS | Performed by: SURGERY

## 2020-10-16 RX ORDER — OXYCODONE HYDROCHLORIDE AND ACETAMINOPHEN 5; 325 MG/1; MG/1
1-2 TABLET ORAL EVERY 4 HOURS PRN
Qty: 40 TAB | Refills: 0 | Status: SHIPPED | OUTPATIENT
Start: 2020-10-16 | End: 2020-10-23

## 2020-10-16 RX ADMIN — IPRATROPIUM BROMIDE 2 SPRAY: 21 SPRAY NASAL at 05:07

## 2020-10-16 RX ADMIN — PILOCARPINE HYDROCHLORIDE 5 MG: 5 TABLET, FILM COATED ORAL at 11:38

## 2020-10-16 RX ADMIN — DULOXETINE HYDROCHLORIDE 120 MG: 60 CAPSULE, DELAYED RELEASE ORAL at 05:07

## 2020-10-16 RX ADMIN — FLUCONAZOLE 400 MG: 200 TABLET ORAL at 05:07

## 2020-10-16 RX ADMIN — DORZOLAMIDE HYDROCHLORIDE 1 DROP: 20 SOLUTION/ DROPS OPHTHALMIC at 11:39

## 2020-10-16 RX ADMIN — ENOXAPARIN SODIUM 40 MG: 40 INJECTION SUBCUTANEOUS at 05:07

## 2020-10-16 RX ADMIN — PILOCARPINE HYDROCHLORIDE 5 MG: 5 TABLET, FILM COATED ORAL at 05:07

## 2020-10-16 RX ADMIN — OXYCODONE 5 MG: 5 TABLET ORAL at 10:48

## 2020-10-16 RX ADMIN — OXYCODONE 5 MG: 5 TABLET ORAL at 02:54

## 2020-10-16 RX ADMIN — TIMOLOL MALEATE 1 DROP: 5 SOLUTION/ DROPS OPHTHALMIC at 05:08

## 2020-10-16 RX ADMIN — OXYCODONE 5 MG: 5 TABLET ORAL at 06:34

## 2020-10-16 RX ADMIN — DORZOLAMIDE HYDROCHLORIDE 1 DROP: 20 SOLUTION/ DROPS OPHTHALMIC at 05:08

## 2020-10-16 ASSESSMENT — PAIN DESCRIPTION - PAIN TYPE
TYPE: ACUTE PAIN
TYPE: ACUTE PAIN
TYPE: SURGICAL PAIN

## 2020-10-16 NOTE — PROGRESS NOTES
Patient to be discharged today - patient aware and agreeable to plan. D/c instructions reviewed with patient - ?'s/concerns answered. Patient has percocet rx with her.

## 2020-10-16 NOTE — PROGRESS NOTES
Assumed care at 1845. Pt resting in bed. A&ox 4  Ambulating independently  Tolerating diet  abd midline staples.   LLQ ileostomy +output. Pt does self care  Dressing to abd CDI. Old royce sites.   Voiding in the BR.   Last iv abx dose tonight. R IJ 3L in place  Hope to dc home haydee with HH  Pain controlled with oxy 5  Call light within reach. Hourly rounding in place

## 2020-10-16 NOTE — PROGRESS NOTES
Assumed care of patient at 0700. Patient is alert and oriented, respirations are unlabored and regular, patient sitting up in bed at this time. Pain stated at 7, declines intervention until next oral pain medication is due. Lung sounds clear throughout on room air. Abdomen soft, tender, midline incision with staples, CDI, LUQ ostomy with green output. Voiding without difficulty. Bed in lowest position, call light within reach, patient states no needs at this time.

## 2020-10-16 NOTE — DISCHARGE PLANNING
Care Transition Team Discharge Planning    Anticipated Discharge Information  Discharge Disposition: D/T to home under care of home health w/planned hosp IP readmit (86)  Discharge Address: [address] [address]  Discharge Contact Phone Number: [phone]              Discharge Plan:  Home with Home Health    This RN CM is following the case. Pt has been accepted by [facility] and will assist Pt with discharge as needed.

## 2020-10-16 NOTE — DISCHARGE SUMMARY
Discharge Summary      DATE OF ADMISSION: 9/29/2020    DATE OF DISCHARGE: 10/16/20    ADMISSION DIAGNOSIS (ES):  ? Colostomy    DISCHARGE DIAGNOSIS (ES):  ? Ileostomy  ? Colon Perforation    DISCHARGE CONDITION:  ? stable    CONSULTATIONS:  ? none    PROCEDURES:  ? Open Katz's Colostomy Reversal  ? ExLap with Right Colectomy and End Ileostomy Placement    BRIEF HPI:  ? 70y F here with an end ascending colostomy for a planned reversal procedure.  Pt completed a bowel prep and presents for elective surgery    HOSPITAL COURSE:  ? After undergoing the above procedure,she was transferred to the [facility] for recovery.  She had slow return of bowel function and was diagnosed with post-op ileus.  She was monitored with NGT in place and kept on IVF.  CRPs trended down and WBC remain wnl.  Eventually she was evaluated further with CT scan which showed a distended cecum but no free air, fluid or anastomotic leak.  ? Later the same evening as her Ct she developed worsening pain and increased resp difficulty.  Repeat imaging showed pneumoperitoneum and she was taken emergently for exploratory surgery.  A perforated cecum was noted and right colectomy completed with end ileostomy placement  ? Post-op she was started on TPN and kept NPO until bowel function recovered.  She was treated with 7 days of IV Abx as well.  After function returned she was able to ambulate and tolerate solids and was comfortable with ostomy care.  Drains were all removed as were surgical staples.  Home health arrangements were made and she was discharge home  ? At the time of discharge, wounds were healing well, she was tolerating PO, and pain was controlled.  She was having regular ostomy output as well.      MEDS:   Current Outpatient Medications   Medication Sig Dispense Refill   • oxyCODONE-acetaminophen (PERCOCET) 5-325 MG Tab Take 1-2 Tabs by mouth every four hours as needed (pain) for up to 7 days. 40 Tab 0       FOLLOW-UP:  ? Please call my office  at [phone] to make an appointment in 1 weeks    DISCHARGE INSTRUCTIONS:

## 2020-10-16 NOTE — DISCHARGE INSTRUCTIONS
Ileostomy, Care After  This sheet gives you information about how to care for yourself after your procedure. Your health care provider may also give you more specific instructions. If you have problems or questions, contact your health care provider.  What can I expect after the procedure?  After the procedure, it is common to have:  · A small amount of blood or clear fluid leaking from your stoma.  · Pain and discomfort in your abdomen, especially around your stoma.  · Irregular bowel movements for several days.  · Loose stool.  Follow these instructions at home:  Medicines  · Take over-the-counter and prescription medicines only as told by your health care provider.  · If you were prescribed an antibiotic medicine, take it as told by your health care provider. Do not stop taking the antibiotic even if you start to feel better.  Stoma and incision care    · Keep the skin that surrounds your stoma clean and dry.  · Follow instructions from your health care provider about how to take care of your incision. Make sure you:  ? Wash your hands with soap and water before and after you change your bandage (dressing). If soap and water are not available, use hand .  ? Change your dressing as told by your health care provider.  ? Leave stitches (sutures), skin glue, or adhesive strips in place. These skin closures may need to stay in place for 2 weeks or longer. If adhesive strip edges start to loosen and curl up, you may trim the loose edges. Do not remove adhesive strips completely unless your health care provider tells you to do that.  · Check your stoma area every day for signs of infection. Check for:  ? More redness, swelling, or pain.  ? More fluid or blood.  ? Warmth.  ? Pus or a bad smell.  · Follow your health care provider's instructions about changing and cleaning your ostomy pouch.  · Keep supplies with you at all times to care for your stoma and ostomy pouch. Also keep extra clothing with you.  Eating  and drinking    · Follow instructions from your health care provider about eating or drinking restrictions.  · Pay attention to which foods and drinks cause problems with digestion, such as gas, constipation, or diarrhea.  · Avoid spicy foods and caffeine while your stoma heals.  · Eat meals and snacks at regular intervals.  · Drink enough fluid to keep your urine pale yellow.  Activity    · Return to your normal activities as told by your health care provider. Ask your health care provider what activities are safe for you.  · Rest as much as possible while your stoma heals.  · Avoid intense physical activity for as long as you are told by your health care provider.  · Do not lift anything that is heavier than 10 lb (4.5 kg), or the limit that you are told, for 6 weeks or until your health care provider says that it is safe.  General instructions  · Do not drive or use heavy machinery while taking prescription pain medicine.  · Wear compression stockings as told by your health care provider. These stockings help to prevent blood clots and reduce swelling in your legs.  · Do not take baths, swim, or use a hot tub until your health care provider approves. Ask your health care provider if you may take showers.  · Do not use any products that contain nicotine or tobacco, such as cigarettes, e-cigarettes, and chewing tobacco. These can delay incision healing after surgery. If you need help quitting, ask your health care provider.  · (Women) Talk with your health care provider if you plan to become pregnant or if you take birth control pills.  · Keep all follow-up visits as told by your health care provider. This is important.  Contact a health care provider if:  · You have more redness, swelling, or pain at or around your stoma.  · You have more fluid or blood coming from your stoma.  · Your stoma feels warm to the touch.  · You have pus or a bad smell coming from your stoma.  · You have a fever.  · You have loose stools  that do not become thicker after several weeks.  · You have bowel movements more often or less often than your health care provider tells you to expect.  · You feel nauseous.  · You vomit.  · You have abdominal pain, bloating, pressure, or cramping.  · You have problems with sexual activity.  · You have an unusual lack of energy (fatigue).  · You are unusually thirsty or you always have a dry mouth.  Get help right away if:  · You feel dizzy or light-headed.  · You have pain or cramps in your abdomen that get worse or do not go away with medicine.  · Your stoma suddenly changes size or color.  · You have shortness of breath.  · You have bleeding from your stoma that does not stop.  · You vomit more than one time.  · You faint.  · You have internal tissue coming out of your stoma (prolapse).  · You have an irregular heartbeat.  · You have chest pain.  Summary  · Take over-the-counter and prescription medicines only as told by your health care provider.  · Follow your health care provider's instructions about how to take care of your incision and stoma.  · Follow instructions from your health care provider about eating or drinking restrictions.  · Do not take baths, swim, or use a hot tub until your health care provider approves. Ask your health care provider if you may take showers.  · Contact a health care provider if you have more redness, swelling, or pain at or around your stoma.  This information is not intended to replace advice given to you by your health care provider. Make sure you discuss any questions you have with your health care provider.  Document Released: 11/28/2016 Document Revised: 08/26/2019 Document Reviewed: 08/26/2019  [employer]vier Patient Education © 2020 [employer]      Ileostomy Home Guide    An ileostomy is a surgical procedure to make an opening (stoma) for stool to leave your body. The surgery is done when a medical condition prevents stool from passing through the intestines and leaving  your body through the rectum.  During the surgery, a part of the small intestine (ileum) is attached to the stoma made in the abdominal wall. A bag or pouch is fitted over the stoma. Stool and gas will collect in the pouch.  After having this surgery, you will need to empty and change your ileostomy pouch as needed. You will also need to take steps to care for the stoma.  What are the risks?  Risks include:  · Skin irritation around the stoma.  · Leaks in the pouching system.  · Narrowing (constriction) of the stoma if the barrier is cut smaller than the stoma.  Supplies needed:  · One- or two-piece pouching system.  · Curved scissors to cut the ostomy barrier to fit your stoma, if needed.  · Ostomy belt, if needed.  · Any other items your health care provider has recommended, such as ostomy powder, paste, or skin barrier film.  · Soft washcloth or soft paper towel.  How do I care for my stoma?  Your stoma should look pink and moist. At first, the stoma may be swollen, but this swelling will go away within 6 weeks. To care for the stoma:  · Keep the skin around the stoma clean and dry.  · Use a clean, soft washcloth or soft paper towel to gently wash the stoma and the skin around it with warm water.  · Use stoma powder or paste on your skin only as told by your health care provider.  · If your skin becomes irritated, change your ileostomy pouch. The irritation may indicate that the pouch is leaking.  · Measure the stoma opening regularly and record the size. Watch for changes. Share the information with your health care provider.  · Check your stoma area every day for signs of infection. Check for:  ? More redness, swelling, or pain.  ? More fluid or blood.  ? Pus or warmth.  How do I care for my ileostomy pouch?  The pouch that fits over the stoma can have either one or two pieces.  · One-piece pouch: The skin barrier and the pouch are combined in one unit.  · Two-piece pouch: The skin barrier and the pouch are  separate pieces that attach to each other.  Empty your pouch when it is one-third to one-half full. Do not let more stool or gas build up. This could cause the pouch to leak. Some pouches have a built-in gas release valve.  Change your pouch every 3-4 days for the first 6 weeks, and then every 5-7 days after that. You should also change the pouch right away if the skin near the stoma looks irritated.  How do I empty my ileostomy pouch?  You will be taught how to empty your pouch before you leave the hospital. You may be told to:  1. Wash your hands with soap and water. If soap and water are not available, use hand .  2. Sit far back on the toilet.  3. Put pieces of toilet paper into the toilet water. This will prevent splashing as you empty the stool into the toilet bowl.  4. Unclip the tail end of the pouch or open it with the non-removable system that stays attached at the end of the pouch.  5. Unroll the tail of the pouch and empty the stool into the toilet.  6. Use toilet paper to clean the end of the pouch where the stool drained out.  7. Reroll the tail, and attach the clip or fastener that attaches the pieces together.  8. Wash your hands again.  How do I change my ileostomy pouch?  You will be taught how to change the pouch before you leave the hospital. You may be told to:  1. Lay out your supplies.  2. Wash your hands with soap and water. If soap and water are not available, use hand .  3. Carefully remove the old pouch.  4. Wash the stoma and the skin around the stoma. Allow the area to dry. Men may be told to carefully shave any hair around the stoma.  5. Use the stoma measuring guide that comes with your pouch to decide what size hole you will need to cut in the skin barrier piece. Choose the smallest possible size that will go around the stoma but will not touch it.  6. Use the guide to trace the [place] on the back of the skin barrier piece.  7. Cut out the hole.  8. Hold the skin  barrier piece over the stoma to make sure the hole is the correct size.  9. Remove the adhesive paper backing from the skin barrier piece.  10. If instructed by your health care provider, squeeze stoma paste around the opening of the skin barrier piece.  11. Clean and dry the skin around the stoma again.  12. Carefully fit the skin barrier piece over your stoma.  13. If you are using a two-piece pouch, snap the pouch onto the skin barrier piece.  14. Close the tail of the pouch.  15. Put your hand over the top of the skin barrier piece to help warm it for about 5 minutes. This helps it conform to your body better.  16. Wash your hands again.  What are some general tips?  · Avoid wearing tight clothing over your stoma.  · You can shower with or without the pouch in place. Do not use harsh or oily soaps or lotions. Always keep the pouch on if you are taking a bath or swimming.  · If your pouch gets wet, you can dry it with a hair dryer on the cool setting.  · Whenever you leave home, take extra clothing and ostomy supplies with you, including a skin barrier and pouch.  · Store all supplies in a cool, dry place. Do not leave supplies in extreme heat as this can melt the parts.  · Return to your normal activities as told by your health care provider. Ask your health care provider what activities are safe for you.  Where to find more information  · [facility]: www.ostomy.org  Contact a health care provider if:  · You have more redness, swelling, or pain around your stoma.  · You have more fluid or blood coming from your stoma.  · Your stoma feels warm to the touch.  · You have pus coming from your stoma.  · Your stoma extends in or out farther than normal.  · Your stoma becomes purple, black, or pale white.  · You need to change the pouch every day.  · You have a fever.  · You have abdominal pain, nausea, or bloating.  · No stool is passing from the stoma.  · You have diarrhea, requiring you to  empty the pouch more frequently than normal.  Get help right away if:  · Your stool is bloody.  · You vomit.  · You have trouble breathing.  · You feel dizzy or light-headed.  Summary  · You will need to empty and change your ileostomy pouch as told by your health care provider.  · You will need to take steps to care for the stoma.  · Contact a health care provider if you need to change the pouch every day, have abdominal pain, nausea, or bloating, or if you have diarrhea, requiring you to empty the pouch more frequently than normal.  · Get help right away if your stool is bloody.  This information is not intended to replace advice given to you by your health care provider. Make sure you discuss any questions you have with your health care provider.  Document Released: 12/20/2004 Document Revised: 11/21/2019 Document Reviewed: 11/21/2019  [employer] Patient Education © 2020 [employer]      Discharge Instructions    Discharged to home by car with relative. Discharged via wheelchair, hospital escort: Yes.  Special equipment needed: Not Applicable    Be sure to schedule a follow-up appointment with your primary care doctor or any specialists as instructed.     Discharge Plan:   Diet Plan: Discussed  Activity Level: Discussed  Confirmed Follow up Appointment: Appointment Scheduled  Confirmed Symptoms Management: Discussed  Medication Reconciliation Updated: Yes  Influenza Vaccine Indication: Indicated: 65 years and older  Influenza Vaccine Given - only chart on this line when given: Influenza Vaccine Given (See MAR)    I understand that a diet low in cholesterol, fat, and sodium is recommended for good health. Unless I have been given specific instructions below for another diet, I accept this instruction as my diet prescription.   Other diet: regular    Special Instructions: None    · Is patient discharged on Warfarin / Coumadin?   No     Depression / Suicide Risk    As you are discharged from [facility]  facility, it is important to learn how to keep safe from harming yourself.    Recognize the warning signs:  · Abrupt changes in personality, positive or negative- including increase in energy   · Giving away possessions  · Change in eating patterns- significant weight changes-  positive or negative  · Change in sleeping patterns- unable to sleep or sleeping all the time   · Unwillingness or inability to communicate  · Depression  · Unusual sadness, discouragement and loneliness  · Talk of wanting to die  · Neglect of personal appearance   · Rebelliousness- reckless behavior  · Withdrawal from people/activities they love  · Confusion- inability to concentrate     If you or a loved one observes any of these behaviors or has concerns about self-harm, here's what you can do:  · Talk about it- your feelings and reasons for harming yourself  · Remove any means that you might use to hurt yourself (examples: pills, rope, extension cords, firearm)  · Get professional help from the community (Mental Health, Substance Abuse, psychological counseling)  · Do not be alone:Call your Safe Contact- someone whom you trust who will be there for you.  · Call your local CRISIS HOTLINE 334-2090 or 067-038-2040  · Call your local Children's Mobile Crisis Response Team [place] (960) 497-4478 or www.MyEnergy  · Call the toll free National Suicide Prevention Hotlines   · National Suicide Prevention Lifeline 759-213-QUCA (1131)  · National Hope Line Network 800-SUICIDE (470-4533)

## 2020-10-16 NOTE — CARE PLAN
Problem: Safety  Goal: Will remain free from injury  Outcome: PROGRESSING AS EXPECTED  Goal: Will remain free from falls  Outcome: PROGRESSING AS EXPECTED   Updated about POC. Reinforce call light use. Pt acknowledged understanding    Problem: Infection  Goal: Will remain free from infection  Outcome: PROGRESSING AS EXPECTED  Iv abx continued     Problem: Bowel/Gastric:  Goal: Normal bowel function is maintained or improved  Outcome: PROGRESSING AS EXPECTED  Goal: Will not experience complications related to bowel motility  Outcome: PROGRESSING AS EXPECTED  Tolerating diet. +output in ileostomy

## 2020-10-16 NOTE — PROGRESS NOTES
Patient taken to discharge lounge to wait for ride. Belongings with patient, glasses, retainer (one has been lost here at the hospital). Patient has prescription.

## 2020-10-16 NOTE — DISCHARGE PLANNING
Agency/Facility Name: [facility]  Spoke To: Sharmin   Outcome: Notified Sharmin pt is discharging today. Faxed DC summary per request.

## 2020-10-28 NOTE — Clinical Note
PSI # and Descriptor: PSI 15 - Abdominopelvic Accidental Puncture / Laceration    Patient Name: Tamiko Cabrera    MRN: [id number]    Admit Date: 9/29/20     [place] [facility] [place] Physician: Dr. Kenn Boyce    Background on PSI:   PSI 15: Code linked to PSI 15:  Accidental puncture and laceration of digestive system or during digestive procedure. Found no Computer Assisted Coding evidence on that code. Searched for puncture/laceration in chart, none found. Query sent by [facility] THIEN Erickson for link between colon perforation and peritonitis. MD response: “Colon perforation and peritonitis are unexpected and a complication of the procedure performed” but no accidental punctures or laceration found in chart. HIM Coding team agreed with removal of offending code.    Is there opportunity to potentially avoid the PSI with improved documentation? No     Will a query be necessary to clarify this information? No     Outcome: PSI avoided.       PSI notice to: Deepthi Michele, Elidia Joyce and Sandy Rivera  Date sent: 10/28/20

## 2020-10-29 ENCOUNTER — APPOINTMENT (OUTPATIENT)
Dept: RADIOLOGY | Facility: MEDICAL CENTER | Age: 70
End: 2020-10-29
Attending: EMERGENCY MEDICINE
Payer: MEDICARE

## 2020-10-29 ENCOUNTER — HOSPITAL ENCOUNTER (EMERGENCY)
Facility: MEDICAL CENTER | Age: 70
End: 2020-10-29
Attending: EMERGENCY MEDICINE
Payer: MEDICARE

## 2020-10-29 VITALS
SYSTOLIC BLOOD PRESSURE: 121 MMHG | HEART RATE: 85 BPM | RESPIRATION RATE: 17 BRPM | TEMPERATURE: 97.9 F | BODY MASS INDEX: 18.59 KG/M2 | HEIGHT: 64 IN | WEIGHT: 108.91 LBS | DIASTOLIC BLOOD PRESSURE: 72 MMHG | OXYGEN SATURATION: 96 %

## 2020-10-29 DIAGNOSIS — R10.11 RIGHT UPPER QUADRANT ABDOMINAL PAIN: ICD-10-CM

## 2020-10-29 LAB
ALBUMIN SERPL BCP-MCNC: 4.1 G/DL (ref 3.2–4.9)
ALBUMIN/GLOB SERPL: 1.4 G/DL
ALP SERPL-CCNC: 116 U/L (ref 30–99)
ALT SERPL-CCNC: 12 U/L (ref 2–50)
ANION GAP SERPL CALC-SCNC: 10 MMOL/L (ref 7–16)
APPEARANCE UR: CLEAR
AST SERPL-CCNC: 14 U/L (ref 12–45)
BASOPHILS # BLD AUTO: 1 % (ref 0–1.8)
BASOPHILS # BLD: 0.06 K/UL (ref 0–0.12)
BILIRUB SERPL-MCNC: 0.3 MG/DL (ref 0.1–1.5)
BILIRUB UR QL STRIP.AUTO: NEGATIVE
BUN SERPL-MCNC: 7 MG/DL (ref 8–22)
CALCIUM SERPL-MCNC: 9.5 MG/DL (ref 8.5–10.5)
CHLORIDE SERPL-SCNC: 99 MMOL/L (ref 96–112)
CO2 SERPL-SCNC: 26 MMOL/L (ref 20–33)
COLOR UR: YELLOW
CREAT SERPL-MCNC: 0.56 MG/DL (ref 0.5–1.4)
EOSINOPHIL # BLD AUTO: 0.17 K/UL (ref 0–0.51)
EOSINOPHIL NFR BLD: 2.9 % (ref 0–6.9)
ERYTHROCYTE [DISTWIDTH] IN BLOOD BY AUTOMATED COUNT: 48.8 FL (ref 35.9–50)
GLOBULIN SER CALC-MCNC: 2.9 G/DL (ref 1.9–3.5)
GLUCOSE SERPL-MCNC: 104 MG/DL (ref 65–99)
GLUCOSE UR STRIP.AUTO-MCNC: NEGATIVE MG/DL
HCT VFR BLD AUTO: 38.8 % (ref 37–47)
HGB BLD-MCNC: 12.5 G/DL (ref 12–16)
IMM GRANULOCYTES # BLD AUTO: 0.03 K/UL (ref 0–0.11)
IMM GRANULOCYTES NFR BLD AUTO: 0.5 % (ref 0–0.9)
KETONES UR STRIP.AUTO-MCNC: NEGATIVE MG/DL
LEUKOCYTE ESTERASE UR QL STRIP.AUTO: NEGATIVE
LIPASE SERPL-CCNC: 74 U/L (ref 11–82)
LYMPHOCYTES # BLD AUTO: 1.78 K/UL (ref 1–4.8)
LYMPHOCYTES NFR BLD: 30.4 % (ref 22–41)
MCH RBC QN AUTO: 30.9 PG (ref 27–33)
MCHC RBC AUTO-ENTMCNC: 32.2 G/DL (ref 33.6–35)
MCV RBC AUTO: 95.8 FL (ref 81.4–97.8)
MICRO URNS: NORMAL
MONOCYTES # BLD AUTO: 0.58 K/UL (ref 0–0.85)
MONOCYTES NFR BLD AUTO: 9.9 % (ref 0–13.4)
NEUTROPHILS # BLD AUTO: 3.23 K/UL (ref 2–7.15)
NEUTROPHILS NFR BLD: 55.3 % (ref 44–72)
NITRITE UR QL STRIP.AUTO: NEGATIVE
NRBC # BLD AUTO: 0 K/UL
NRBC BLD-RTO: 0 /100 WBC
PH UR STRIP.AUTO: 5 [PH] (ref 5–8)
PLATELET # BLD AUTO: 455 K/UL (ref 164–446)
PMV BLD AUTO: 9.1 FL (ref 9–12.9)
POTASSIUM SERPL-SCNC: 4.1 MMOL/L (ref 3.6–5.5)
PROT SERPL-MCNC: 7 G/DL (ref 6–8.2)
PROT UR QL STRIP: NEGATIVE MG/DL
RBC # BLD AUTO: 4.05 M/UL (ref 4.2–5.4)
RBC UR QL AUTO: NEGATIVE
SODIUM SERPL-SCNC: 135 MMOL/L (ref 135–145)
SP GR UR STRIP.AUTO: 1.02
UROBILINOGEN UR STRIP.AUTO-MCNC: 0.2 MG/DL
WBC # BLD AUTO: 5.9 K/UL (ref 4.8–10.8)

## 2020-10-29 PROCEDURE — 36415 COLL VENOUS BLD VENIPUNCTURE: CPT

## 2020-10-29 PROCEDURE — 700111 HCHG RX REV CODE 636 W/ 250 OVERRIDE (IP): Performed by: EMERGENCY MEDICINE

## 2020-10-29 PROCEDURE — 96375 TX/PRO/DX INJ NEW DRUG ADDON: CPT

## 2020-10-29 PROCEDURE — 76705 ECHO EXAM OF ABDOMEN: CPT

## 2020-10-29 PROCEDURE — 81003 URINALYSIS AUTO W/O SCOPE: CPT

## 2020-10-29 PROCEDURE — 700117 HCHG RX CONTRAST REV CODE 255: Performed by: EMERGENCY MEDICINE

## 2020-10-29 PROCEDURE — 74177 CT ABD & PELVIS W/CONTRAST: CPT

## 2020-10-29 PROCEDURE — 83690 ASSAY OF LIPASE: CPT

## 2020-10-29 PROCEDURE — 85025 COMPLETE CBC W/AUTO DIFF WBC: CPT

## 2020-10-29 PROCEDURE — 99285 EMERGENCY DEPT VISIT HI MDM: CPT

## 2020-10-29 PROCEDURE — A9270 NON-COVERED ITEM OR SERVICE: HCPCS | Performed by: EMERGENCY MEDICINE

## 2020-10-29 PROCEDURE — 80053 COMPREHEN METABOLIC PANEL: CPT

## 2020-10-29 PROCEDURE — 700102 HCHG RX REV CODE 250 W/ 637 OVERRIDE(OP): Performed by: EMERGENCY MEDICINE

## 2020-10-29 PROCEDURE — 96374 THER/PROPH/DIAG INJ IV PUSH: CPT | Mod: XU

## 2020-10-29 RX ORDER — ACETAMINOPHEN 500 MG
1000 TABLET ORAL EVERY 6 HOURS PRN
COMMUNITY
End: 2021-01-29

## 2020-10-29 RX ORDER — HYDROCODONE BITARTRATE AND ACETAMINOPHEN 5; 325 MG/1; MG/1
1 TABLET ORAL EVERY 6 HOURS PRN
Qty: 12 TAB | Refills: 0 | Status: SHIPPED | OUTPATIENT
Start: 2020-10-29 | End: 2020-11-01

## 2020-10-29 RX ORDER — IBUPROFEN 200 MG
600 TABLET ORAL EVERY 6 HOURS PRN
COMMUNITY
End: 2021-01-29

## 2020-10-29 RX ORDER — OXYCODONE HYDROCHLORIDE AND ACETAMINOPHEN 5; 325 MG/1; MG/1
1 TABLET ORAL ONCE
Status: COMPLETED | OUTPATIENT
Start: 2020-10-29 | End: 2020-10-29

## 2020-10-29 RX ORDER — TRAZODONE HYDROCHLORIDE 150 MG/1
200 TABLET ORAL NIGHTLY
COMMUNITY

## 2020-10-29 RX ADMIN — IOHEXOL 80 ML: 350 INJECTION, SOLUTION INTRAVENOUS at 19:18

## 2020-10-29 RX ADMIN — FENTANYL CITRATE 50 MCG: 50 INJECTION, SOLUTION INTRAMUSCULAR; INTRAVENOUS at 18:44

## 2020-10-29 RX ADMIN — OXYCODONE HYDROCHLORIDE AND ACETAMINOPHEN 1 TABLET: 5; 325 TABLET ORAL at 22:21

## 2020-10-29 SDOH — HEALTH STABILITY: MENTAL HEALTH: HOW OFTEN DO YOU HAVE A DRINK CONTAINING ALCOHOL?: 4 OR MORE TIMES A WEEK

## 2020-10-29 SDOH — HEALTH STABILITY: MENTAL HEALTH: HOW MANY STANDARD DRINKS CONTAINING ALCOHOL DO YOU HAVE ON A TYPICAL DAY?: 1 OR 2

## 2020-10-29 SDOH — HEALTH STABILITY: MENTAL HEALTH: HOW OFTEN DO YOU HAVE 6 OR MORE DRINKS ON ONE OCCASION?: NEVER

## 2020-10-29 ASSESSMENT — PAIN DESCRIPTION - PAIN TYPE: TYPE: ACUTE PAIN

## 2020-10-29 ASSESSMENT — FIBROSIS 4 INDEX: FIB4 SCORE: 0.46

## 2020-10-29 NOTE — ED TRIAGE NOTES
"Tamiko Cabrera  Chief Complaint   Patient presents with   • RUQ Pain     Pt complains of RIGHT sided abd pain x 2 weeks SP Illieostomy. Pt evaluated by surgeon and told to come to ER. Pt endorses Nausea but no Vomitting. Pt denies fevers,cough or cold symptoms.    • RLQ Pain     Pt ambulatory to triage with above complaint. Protocol Ordered.    /83   Pulse (!) 104   Temp 36.6 °C (97.8 °F) (Temporal)   Resp 20   Ht 1.626 m (5' 4\")   Wt 49.4 kg (108 lb 14.5 oz)   SpO2 98%   BMI 18.69 kg/m²     Pt informed of triage process and encouraged to notify staff of any changes or concerns. Pt verbalized understanding of instructions. Apologized for long wait time. Pt placed back in lobby.     "

## 2020-10-30 NOTE — ED NOTES
Pt reports pain remains at a comfortable level at this time, denies N, pt informed of updated care plan and agrees, will continue to monitor

## 2020-10-30 NOTE — ED PROVIDER NOTES
ED Provider Note    CHIEF COMPLAINT  Chief Complaint   Patient presents with   • RUQ Pain     Pt complains of RIGHT sided abd pain x 2 weeks SP Illieostomy. Pt evaluated by surgeon and told to come to ER. Pt endorses Nausea but no Vomitting. Pt denies fevers,cough or cold symptoms.    • RLQ Pain       HPI  Tamiko Cabrera is a 70 y.o. female who presents with right-sided abdominal pain.  Has a rather complicated abdominal surgical history.  Last operated on by Dr. Boyce earlier this month on 10/8/2020.  She had diagnosis of perforated cecum and had an ex lap done with right colectomy and ileostomy placement.  On , she had another surgery for colostomy takedown with lysis of adhesions.  She states that she was discharged on the  and since then she has had persistent right-sided abdominal pain.  Nausea without vomiting.  No fevers.  Has watery stool output out of the ileostomy.  No bloody output.  No significant changes in volume of output.  No chest pain or trouble breathing.  No dysuria or hematuria.  She was instructed to come to the emergency department for further evaluation for abdominal pain.    REVIEW OF SYSTEMS  See HPI for further details. All other systems are negative.     PAST MEDICAL HISTORY   has a past medical history of Chest pain, Chronic migraine, Chronic neck pain, Depression, Gastric ulcer, Hyperlipidemia, Insomnia, Melanoma in situ of back (HCC), Nocturnal hypoxemia, Osteopenia, Scoliosis, Transfusion history, and Varicose veins.    SOCIAL HISTORY  Social History     Tobacco Use   • Smoking status: Former Smoker     Packs/day: 3.50     Years: 8.00     Pack years: 28.00     Types: Cigarettes     Quit date: 1977     Years since quittin.8   • Smokeless tobacco: Never Used   Substance and Sexual Activity   • Alcohol use: Yes     Alcohol/week: 3.5 oz     Types: 7 Standard drinks or equivalent per week     Frequency: 4 or more times a week     Drinks per session: 1 or 2      "Binge frequency: Never   • Drug use: No     Comment: marijuana at HS for insomnia   • Sexual activity: Not on file       SURGICAL HISTORY   has a past surgical history that includes hysterectomy, total abdominal; hardware removal spine; nissen fundoplication laparoscopic; pyloroplasty; vagotomy; breast biopsy; carpal tunnel release; sinusotomies; wide excision melanoma, leg, w/poss.stsg; other; other (2005); close enterostomy,resec+colorec anas (9/29/2020); cystoscopy,insert ureteral stent (Bilateral, 9/29/2020); ventral hernia repair (9/29/2020); and exploratory of abdomen (N/A, 10/8/2020).    CURRENT MEDICATIONS  Home Medications     Reviewed by Justin Armendariz (Pharmacy Tech) on 10/29/20 at 2139  Med List Status: Complete   Medication Last Dose Status   acetaminophen (TYLENOL) 500 MG Tab 10/29/2020 Active   Butalbital-APAP-Caffeine (FIORICET PO) >3 days ago Active   dorzolamide (TRUSOPT) 2 % Solution 10/29/2020 Active   DULoxetine (CYMBALTA) 60 MG Cap DR Particles delayed-release capsule 10/29/2020 Active   famotidine (PEPCID) 20 MG Tab 10/29/2020 Active   ibuprofen (MOTRIN) 200 MG Tab 10/29/2020 Active   ipratropium (ATROVENT) 0.03 % Solution 10/29/2020 Active   latanoprost (XALATAN) 0.005 % SOLN 10/28/2020 Active   pilocarpine (SALAGEN) 5 MG Tab 10/29/2020 Active   timolol (TIMOPTIC) 0.5 % Solution 10/29/2020 Active   traZODone (DESYREL) 100 MG Tab 10/28/2020 Active                ALLERGIES  Allergies   Allergen Reactions   • Morphine Unspecified     Hallucinations.        PHYSICAL EXAM  VITAL SIGNS: /83   Pulse (!) 104   Temp 36.6 °C (97.8 °F) (Temporal)   Resp 20   Ht 1.626 m (5' 4\")   Wt 49.4 kg (108 lb 14.5 oz)   SpO2 98%   BMI 18.69 kg/m²   Pulse ox interpretation: I interpret this pulse ox as normal.  Constitutional: Alert in no apparent distress.  HENT: No signs of trauma, Bilateral external ears normal, Nose normal.   Eyes: Pupils are equal and reactive, Conjunctiva normal, " Non-icteric.   Neck: Normal range of motion, No tenderness, Supple, No stridor.   Cardiovascular: Regular rate and rhythm.   Thorax & Lungs: Normal breath sounds, No respiratory distress, No wheezing, No chest tenderness.   Abdomen: Bowel sounds normal, Soft, right-sided abdominal tenderness, No masses, No pulsatile masses. No peritoneal signs.  Surgical wounds well-healing.  Left lower quadrant ileostomy in place.  Skin: Warm, Dry, No erythema, No rash.   Back: No bony tenderness, No CVA tenderness.   Extremities: Intact distal pulses, No edema, No tenderness, No cyanosis  Neurologic: Alert, No focal deficits noted.       DIAGNOSTIC STUDIES / PROCEDURES      LABS  Labs Reviewed   CBC WITH DIFFERENTIAL - Abnormal; Notable for the following components:       Result Value    RBC 4.05 (*)     MCHC 32.2 (*)     Platelet Count 455 (*)     All other components within normal limits   COMP METABOLIC PANEL - Abnormal; Notable for the following components:    Glucose 104 (*)     Bun 7 (*)     Alkaline Phosphatase 116 (*)     All other components within normal limits   LIPASE   URINALYSIS,CULTURE IF INDICATED   ESTIMATED GFR         RADIOLOGY  US-RUQ   Final Result         1.  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      CT-ABDOMEN-PELVIS WITH   Final Result      1.  No dilated bowel, free fluid, or abscess      2.  New extrahepatic biliary dilation with the common bile duct measuring up to 16 mm. Additionally, gallbladder shows enhancement of the wall which could be cholecystitis or peritoneal inflammation      3.  Hepatic steatosis      4.  Abdominal postoperative changes with a left-sided ostomy present               COURSE & MEDICAL DECISION MAKING    Medications   oxyCODONE-acetaminophen (PERCOCET) 5-325 MG per tablet 1 Tab (has no administration in  time range)   fentaNYL (SUBLIMAZE) injection 50 mcg (50 mcg Intravenous Given 10/29/20 1844)   iohexol (OMNIPAQUE) 350 mg/mL (80 mL Intravenous Given 10/29/20 1918)       Pertinent Labs & Imaging studies reviewed. (See chart for details)  70 y.o. female presented with right-sided abdominal pain since recent discharge from the hospital about 2 weeks ago.  She has a rather complicated surgical history.  She was recently admitted about a month ago for colostomy takedown however there were complications necessitating formation of ileostomy and further colon resection.  Since discharged home, the patient has had right-sided abdominal pain.  Has been taking oral analgesic medications though has since run out.  No vomiting or fevers.  No change in her ostomy output.  No bloody output.  No dysuria or hematuria.  No chest pain or trouble breathing.  No cough or recent sick contacts.    Vital signs are unremarkable upon arrival.  Patient has right-sided abdominal pain with well-healing surgical wounds.  Has normal liquid output from the ileostomy site.    CT abdomen pelvis was ordered for further evaluation.  Found to have common bile duct dilation and gallbladder enhancement with concerns for cholecystitis or peritoneal inflammation.  As results ultrasound of the right upper quadrant was performed.  There is no evidence of cholecystitis on ultrasound examination.  There was evidence of fusiform dilation of the common bile duct which could be type I choledochal cyst.    I did speak with Dr. Boyce, the patient's surgeon regarding the findings and the patient's condition.  Recommending outpatient follow-up in the next week or so.  No need for inpatient treatment or evaluation at this time.    Patient does have a follow-up appointment already established with her surgeon.  I reviewed all the results with the patient at bedside.  She is resting comfortably and in no distress with normal vital signs and a benign abdominal exam.   No signs of peritonitis.  No obvious surgical complications identified on CT today.  She reports understanding the plan of care.  Due to the patient's poor ability to control her pain symptoms, she was given a few more days of opioid pain medications.  I strongly discouraged her from using more than she needed and did discuss explicitly the risk of addiction to these medications.  Due to her continued pain however, I do believe that further treatment of her pain is reasonable.  She was encouraged to use the lowest dose possible.    In prescribing controlled substances to this patient, I certify that I have obtained and reviewed the medical history of Tamiko Cabrera. I have also made a good della effort to obtain applicable records from other providers who have treated the patient and records did not demonstrate any increased risk of substance abuse that would prevent me from prescribing controlled substances.     I have conducted a physical exam and documented it. I have reviewed Ms. Cabrera’s prescription history as maintained by the [place] Prescription Monitoring Program.     I have assessed the patient’s risk for abuse, dependency, and addiction using the validated Opioid Risk Tool available at https://www.mdcalc.com/crqdnq-gksh-icgc-ort-narcotic-abuse.     Given the above, I believe the benefits of controlled substance therapy outweigh the risks. The reasons for prescribing controlled substances include non-narcotic, oral analgesic alternatives have been inadequate for pain control. Accordingly, I have discussed the risk and benefits, treatment plan, and alternative therapies with the patient.       The patient will not drink alcohol nor drive with prescribed medications.   The patient was instructed to follow-up with primary care physician for further management.  To return immediately for any worsening symptoms or development of any other concerning signs or symptoms. The patient verbalizes understanding in  "their own words.    /75   Pulse 89   Temp 36.6 °C (97.8 °F) (Temporal)   Resp 18   Ht 1.626 m (5' 4\")   Wt 49.4 kg (108 lb 14.5 oz)   SpO2 95%   BMI 18.69 kg/m²     The patient was referred to primary care where they will receive further BP management.      Pro Vila M.D.  [address] [facility] [facility] [address] [phone]    Schedule an appointment as soon as possible for a visit       [facility], Emergency Dept  [address] [address] [phone]    As needed, If symptoms worsen    Kenn Boyce M.D.  [address] [place] [phone]    Schedule an appointment as soon as possible for a visit         FINAL IMPRESSION  1. Right upper quadrant abdominal pain            Electronically signed by: José Miguel Reyes M.D., 10/29/2020 6:09 PM    "

## 2020-11-04 LAB
FUNGUS SPEC CULT: ABNORMAL
FUNGUS SPEC CULT: ABNORMAL
SIGNIFICANT IND 70042: ABNORMAL
SITE SITE: ABNORMAL
SOURCE SOURCE: ABNORMAL

## 2020-12-07 LAB
MYCOBACTERIUM SPEC CULT: NORMAL
RHODAMINE-AURAMINE STN SPEC: NORMAL
SIGNIFICANT IND 70042: NORMAL
SITE SITE: NORMAL
SOURCE SOURCE: NORMAL

## 2021-02-03 PROBLEM — R01.1 SYSTOLIC MURMUR: Status: ACTIVE | Noted: 2021-02-03

## 2021-02-03 PROBLEM — D64.9 ANEMIA: Status: ACTIVE | Noted: 2021-02-03

## 2021-02-08 PROBLEM — I34.0 MILD MITRAL REGURGITATION: Status: ACTIVE | Noted: 2021-02-08

## 2021-02-08 PROBLEM — I35.1 MILD AI (AORTIC INSUFFICIENCY): Status: ACTIVE | Noted: 2021-02-08

## 2021-02-16 PROBLEM — Z11.9 SCREENING EXAMINATION FOR INFECTIOUS DISEASE: Status: RESOLVED | Noted: 2020-10-09 | Resolved: 2021-02-16

## 2021-02-16 PROBLEM — Z78.9 NO CONTRAINDICATION TO DEEP VEIN THROMBOSIS (DVT) PROPHYLAXIS: Status: RESOLVED | Noted: 2020-10-09 | Resolved: 2021-02-16

## 2021-03-25 PROBLEM — M81.0 AGE-RELATED OSTEOPOROSIS WITHOUT CURRENT PATHOLOGICAL FRACTURE: Status: ACTIVE | Noted: 2021-03-25

## 2021-07-01 NOTE — PROGRESS NOTES
Covid results not back at this time, OR team aware, taking to OR.    On Xanax 0.25mg q6 PRN and Lexapro 5mg qd.

## 2021-07-23 PROBLEM — R91.8 MULTILOBAR LUNG INFILTRATE: Status: RESOLVED | Noted: 2020-10-08 | Resolved: 2021-07-23

## 2021-08-03 NOTE — PROGRESS NOTES
Medicare Wellness Visit  Plan for Preventive Care    A good way for you to stay healthy is to use preventive care.  Medicare covers many services that can help you stay healthy.* The goal of these services is to find any health problems as quickly as possible. Finding problems early can help make them easier to treat.  Your personal plan below lists the services you may need and when they are due.     Health Maintenance Summary     Shingles Vaccine (1 of 2)  Overdue - never done    Pneumococcal Vaccine 65+ (2 of 4 - PPSV23)  Overdue since 8/24/2020    Medicare Wellness Visit (Yearly)  Due since 7/28/2021    Influenza Vaccine (1)  Due since 8/1/2021    Depression Screening (Yearly)  Due soon on 12/9/2021    Colonoscopy Risk (Every 5 Years)  Next due on 4/18/2023    DTaP/Tdap/Td Vaccine (2 - Td or Tdap)  Next due on 10/20/2025    Hepatitis C Screening   Completed    COVID-19 Vaccine   Completed    Hepatitis B Vaccine   Aged Out    Meningococcal Vaccine   Aged Out    HPV Vaccine   Aged Out           Preventive Care for Women and Men    Heart Screenings (Cardiovascular):  · Blood tests are used to check your cholesterol, lipid and triglyceride levels. High levels can increase your risk for heart disease and stroke. High levels can be treated with medications, diet and exercise. Lowering your levels can help keep your heart and blood vessels healthy.  Your provider will order these tests if they are needed.    · An ultrasound is done to see if you have an abdominal aortic aneurysm (AAA).  This is an enlargement of one of the main blood vessels that delivers blood to the body.   In the [place], 9,000 deaths are caused by AAA.  You may not even know you have this problem and as many as 1 in 3 people will have a serious problem if it is not treated.  Early diagnosis allows for more effective treatment and cure.  If you have a family history of AAA or are a male age 65-75 who has smoked, you are at higher risk of an  "10/8  S:  70 y.o.female s/p Open Katz's Reversal  POD# 9.  Patient still not having total return of bowel function.  Feels full with PO intake, ambulating some but limited due to back pain.      O:  /89   Pulse 88   Temp 37.7 °C (99.8 °F) (Temporal)   Resp 18   Ht 1.626 m (5' 4\")   Wt 55 kg (121 lb 4.1 oz)   SpO2 94%   I/O last 3 completed shifts:  In: 2850 [P.O.:850; I.V.:2000]  Out: 210 [Drains:10]  Recent Labs     10/06/20  0801 10/07/20  0208   SODIUM 139 138   POTASSIUM 3.5* 3.4*   CHLORIDE 100 102   CO2 24 25   GLUCOSE 108* 120*   BUN 17 19   CREATININE 0.44* 0.57   CALCIUM 9.4 8.8     Recent Labs     10/06/20  0801 10/07/20  0208   WBC 11.3* 8.3   RBC 4.15* 3.60*   HEMOGLOBIN 13.5 11.5*   HEMATOCRIT 40.0 34.5*   MCV 96.4 95.8   MCH 32.5 31.9   MCHC 33.8 33.3*   RDW 46.0 45.9   PLATELETCT 402 367   MPV 9.7 9.1       Alert and Oriented x3, No Acute Distress  Normal Respiratory Effort  Abdomen soft, appropriately tender  Incisions/Bandages clean/dry/intact  Extremities warm and well perfused    A/P:  Pain control with PO meds, trying to avoid narcotics  Diet liquids ok  Fluids continue, consider TPN if she isn't advancing diet later today  Ambulate tid and ad brandee  CT A/P to check for post-operative complications.  Low suspicion given normal WBC and low CRP.      " AAA.  Your provider can order this test, if needed.    Colorectal Screening:  · There are many tests that are used to check for cancer of your colon and rectum. You and your provider should discuss what test is best for you and when to have it done.  Options include:  · Screening Colonoscopy: exam of the entire colon, seen through a flexible lighted tube.  · Flexible Sigmoidoscopy: exam of the last third (sigmoid portion) of the colon and rectum, seen through a flexible lighted tube.  · Cologuard DNA stool test: a sample of your stool is used to screen for cancer and unseen blood in your stool.  · Fecal Occult Blood Test: a sample of your stool is studied to find any unseen blood    Flu Shot:  · An immunization that helps to prevent influenza (the flu). You should get this every year. The best time to get the shot is in the fall.    Pneumococcal Shot:  • Vaccines are available that can help prevent pneumococcal disease, which is any type of infection caused by Streptococcus pneumoniae bacteria.   Their use can prevent some cases of pneumonia, meningitis, and sepsis. There are two types of pneumococcal vaccines:   o Conjugate vaccines (PCV-13 or Prevnar 13®) - helps protect against the 13 types of pneumococcal bacteria that are the most common causes of serious infections in children and adults.    o Polysaccharide vaccine (PPSV23 or [id number]®) - helps protect against 23 types of pneumococcal bacteria for patients who are recommended to get it.  These vaccines should be given at least 12 months apart.  A booster is usually not needed.     Hepatitis B Shot:  · An immunization that helps to protect people from getting Hepatitis B. Hepatitis B is a virus that spreads through contact with infected blood or body fluids. Many people with the virus do not have symptoms.  The virus can lead to serious problems, such as liver disease. Some people are at higher risk than others. Your doctor will tell you if you need this  shot.     Diabetes Screening:  · A test to measure sugar (glucose) in your blood is called a fasting blood sugar. Fasting means you cannot have food or drink for at least 8 hours before the test. This test can detect diabetes long before you may notice symptoms.    Glaucoma Screening:  · Glaucoma screening is performed by your eye doctor. The test measures the fluid pressure inside your eyes to determine if you have glaucoma.     Hepatitis C Screening:  · A blood test to see if you have the hepatitis C virus.  Hepatitis C attacks the liver and is a major cause of chronic liver disease.  Medicare will cover a single screening for all adults born between 1945 & 1965, or high risk patients (people who have injected illegal drugs or people who have had blood transfusions).  High risk patients who continue to inject illegal drugs can be screened for Hepatitis C every year.    Smoking and Tobacco-Use Cessation Counseling:  · Tobacco is the single greatest cause of disease and early death in our country today. Medication and counseling together can increase a person’s chance of quitting for good.   · Medicare covers two quitting attempts per year, with four counseling sessions per attempt (eight sessions in a 12 month period)    Preventive Screening tests for Women    Screening Mammograms and Breast Exams:  · An x-ray of your breasts to check for breast cancer before you or your doctor may be able to feel it.  If breast cancer is found early it can usually be treated with success.    Pelvic Exams and Pap Tests:  · An exam to check for cervical and vaginal cancer. A Pap test is a lab test in which cells are taken from your cervix and sent to the lab to look for signs of cervical cancer. If cancer of the cervix is found early, chances for a cure are good. Testing can generally end at age 65, or if a woman has a hysterectomy for a benign condition. Your provider may recommend more frequent testing if certain abnormal results  are found.    Bone Mass Measurements:  · A painless x-ray of your bone density to see if you are at risk for a broken bone. Bone density refers to the thickness of bones or how tightly the bone tissue is packed.    Preventive Screening tests for Men    Prostate Screening:  · Should you have a prostate cancer test (PSA)?  It is up to you to decide if you want a prostate cancer test. Talk to your clinician to find out if the test is right for you.  Things for you to consider and talk about should include:  · Benefits and harms of the test  · Your family history  · How your race/ethnicity may influence the test  · If the test may impact other medical conditions you have  · Your values on screenings and treatments    *Medicare pays for many preventive services to keep you healthy. For some of these services, you might have to pay a deductible, coinsurance, and / or copayment.  The amounts vary depending on the type of services you need and the kind of Medicare health plan you have.    For further details on screenings offered by Medicare please visit: https://www.medicare.gov/coverage/preventive-screening-services

## 2021-09-17 PROBLEM — R73.02 GLUCOSE INTOLERANCE (IMPAIRED GLUCOSE TOLERANCE): Status: RESOLVED | Noted: 2017-03-15 | Resolved: 2021-09-17

## 2021-09-25 PROBLEM — J43.9 PULMONARY EMPHYSEMA (HCC): Status: ACTIVE | Noted: 2021-09-25

## 2022-02-08 PROBLEM — M46.1 SACROILIITIS (HCC): Status: ACTIVE | Noted: 2022-02-08

## 2022-02-08 PROBLEM — H40.10X0 OPEN-ANGLE GLAUCOMA OF RIGHT EYE: Status: ACTIVE | Noted: 2022-02-08

## 2022-09-28 ENCOUNTER — HOSPITAL ENCOUNTER (OUTPATIENT)
Facility: MEDICAL CENTER | Age: 72
End: 2022-09-28
Attending: NEUROLOGICAL SURGERY | Admitting: NEUROLOGICAL SURGERY
Payer: MEDICARE

## 2022-10-04 ENCOUNTER — PRE-ADMISSION TESTING (OUTPATIENT)
Dept: ADMISSIONS | Facility: MEDICAL CENTER | Age: 72
End: 2022-10-04
Attending: NEUROLOGICAL SURGERY
Payer: MEDICARE

## 2022-10-04 ENCOUNTER — HOSPITAL ENCOUNTER (OUTPATIENT)
Dept: RADIOLOGY | Facility: MEDICAL CENTER | Age: 72
End: 2022-10-04
Attending: NEUROLOGICAL SURGERY | Admitting: NEUROLOGICAL SURGERY
Payer: MEDICARE

## 2022-10-04 VITALS — BODY MASS INDEX: 20.14 KG/M2 | HEIGHT: 64 IN | WEIGHT: 117.95 LBS

## 2022-10-04 DIAGNOSIS — R79.1 ABNORMAL COAGULATION PROFILE: ICD-10-CM

## 2022-10-04 DIAGNOSIS — Z01.811 PRE-OPERATIVE RESPIRATORY EXAMINATION: ICD-10-CM

## 2022-10-04 DIAGNOSIS — Z01.812 PRE-OPERATIVE LABORATORY EXAMINATION: ICD-10-CM

## 2022-10-04 DIAGNOSIS — Z01.810 PRE-OPERATIVE CARDIOVASCULAR EXAMINATION: ICD-10-CM

## 2022-10-04 DIAGNOSIS — M41.9 KYPHOSCOLIOSIS: ICD-10-CM

## 2022-10-04 DIAGNOSIS — R94.31 NONSPECIFIC ABNORMAL ELECTROCARDIOGRAM (ECG) (EKG): ICD-10-CM

## 2022-10-04 DIAGNOSIS — M43.16 SPONDYLOLISTHESIS OF LUMBAR REGION: ICD-10-CM

## 2022-10-04 DIAGNOSIS — R82.90 NONSPECIFIC FINDING ON EXAMINATION OF URINE: ICD-10-CM

## 2022-10-04 LAB
ANION GAP SERPL CALC-SCNC: 12 MMOL/L (ref 7–16)
APPEARANCE UR: CLEAR
APTT PPP: 26.7 SEC (ref 24.7–36)
BASOPHILS # BLD AUTO: 0.5 % (ref 0–1.8)
BASOPHILS # BLD: 0.04 K/UL (ref 0–0.12)
BILIRUB UR QL STRIP.AUTO: NEGATIVE
BUN SERPL-MCNC: 7 MG/DL (ref 8–22)
CALCIUM SERPL-MCNC: 9.5 MG/DL (ref 8.5–10.5)
CHLORIDE SERPL-SCNC: 98 MMOL/L (ref 96–112)
CO2 SERPL-SCNC: 26 MMOL/L (ref 20–33)
COLOR UR: YELLOW
CREAT SERPL-MCNC: 0.46 MG/DL (ref 0.5–1.4)
EKG IMPRESSION: NORMAL
EOSINOPHIL # BLD AUTO: 0.16 K/UL (ref 0–0.51)
EOSINOPHIL NFR BLD: 2.2 % (ref 0–6.9)
ERYTHROCYTE [DISTWIDTH] IN BLOOD BY AUTOMATED COUNT: 50.5 FL (ref 35.9–50)
GFR SERPLBLD CREATININE-BSD FMLA CKD-EPI: 101 ML/MIN/1.73 M 2
GLUCOSE SERPL-MCNC: 117 MG/DL (ref 65–99)
GLUCOSE UR STRIP.AUTO-MCNC: NEGATIVE MG/DL
HCT VFR BLD AUTO: 40.1 % (ref 37–47)
HGB BLD-MCNC: 12.9 G/DL (ref 12–16)
IMM GRANULOCYTES # BLD AUTO: 0.04 K/UL (ref 0–0.11)
IMM GRANULOCYTES NFR BLD AUTO: 0.5 % (ref 0–0.9)
INR PPP: 1.02 (ref 0.87–1.13)
KETONES UR STRIP.AUTO-MCNC: NEGATIVE MG/DL
LEUKOCYTE ESTERASE UR QL STRIP.AUTO: NEGATIVE
LYMPHOCYTES # BLD AUTO: 1.28 K/UL (ref 1–4.8)
LYMPHOCYTES NFR BLD: 17.3 % (ref 22–41)
MCH RBC QN AUTO: 31.1 PG (ref 27–33)
MCHC RBC AUTO-ENTMCNC: 32.2 G/DL (ref 33.6–35)
MCV RBC AUTO: 96.6 FL (ref 81.4–97.8)
MICRO URNS: NORMAL
MONOCYTES # BLD AUTO: 0.71 K/UL (ref 0–0.85)
MONOCYTES NFR BLD AUTO: 9.6 % (ref 0–13.4)
NEUTROPHILS # BLD AUTO: 5.15 K/UL (ref 2–7.15)
NEUTROPHILS NFR BLD: 69.9 % (ref 44–72)
NITRITE UR QL STRIP.AUTO: NEGATIVE
NRBC # BLD AUTO: 0 K/UL
NRBC BLD-RTO: 0 /100 WBC
PH UR STRIP.AUTO: 6.5 [PH] (ref 5–8)
PLATELET # BLD AUTO: 317 K/UL (ref 164–446)
PMV BLD AUTO: 9.9 FL (ref 9–12.9)
POTASSIUM SERPL-SCNC: 3.5 MMOL/L (ref 3.6–5.5)
PROT UR QL STRIP: NEGATIVE MG/DL
PROTHROMBIN TIME: 13.3 SEC (ref 12–14.6)
RBC # BLD AUTO: 4.15 M/UL (ref 4.2–5.4)
RBC UR QL AUTO: NEGATIVE
SODIUM SERPL-SCNC: 136 MMOL/L (ref 135–145)
SP GR UR STRIP.AUTO: 1.01
UROBILINOGEN UR STRIP.AUTO-MCNC: 0.2 MG/DL
WBC # BLD AUTO: 7.4 K/UL (ref 4.8–10.8)

## 2022-10-04 PROCEDURE — 72110 X-RAY EXAM L-2 SPINE 4/>VWS: CPT

## 2022-10-04 PROCEDURE — 80048 BASIC METABOLIC PNL TOTAL CA: CPT

## 2022-10-04 PROCEDURE — 85730 THROMBOPLASTIN TIME PARTIAL: CPT

## 2022-10-04 PROCEDURE — 81003 URINALYSIS AUTO W/O SCOPE: CPT

## 2022-10-04 PROCEDURE — 71046 X-RAY EXAM CHEST 2 VIEWS: CPT

## 2022-10-04 PROCEDURE — 36415 COLL VENOUS BLD VENIPUNCTURE: CPT

## 2022-10-04 PROCEDURE — 93005 ELECTROCARDIOGRAM TRACING: CPT

## 2022-10-04 PROCEDURE — 85025 COMPLETE CBC W/AUTO DIFF WBC: CPT

## 2022-10-04 PROCEDURE — 93010 ELECTROCARDIOGRAM REPORT: CPT | Performed by: INTERNAL MEDICINE

## 2022-10-04 PROCEDURE — 85610 PROTHROMBIN TIME: CPT

## 2022-10-04 RX ORDER — TRAMADOL HYDROCHLORIDE 50 MG/1
50 TABLET ORAL EVERY 6 HOURS PRN
COMMUNITY

## 2022-10-04 ASSESSMENT — FIBROSIS 4 INDEX: FIB4 SCORE: 1.35

## (undated) DEVICE — LEGGING LITHOTOMY 31 X 48 IN - (2EA/PK 20PK/CA)

## (undated) DEVICE — TOWELS CLOTH SURGICAL - (4/PK 20PK/CA)

## (undated) DEVICE — WIRE GUIDE SENSOR DUAL FLEX - 5/BX

## (undated) DEVICE — DRAPE MAYO STAND - (30/CA)

## (undated) DEVICE — GOWN SURGEONS LARGE - (32/CA)

## (undated) DEVICE — TUBE CONNECT SUCTION CLEAR 120 X 1/4" (50EA/CA)"

## (undated) DEVICE — SUTURE 0 VICRYL PLUS CT-1 - 8 X 18 INCH (12/BX)

## (undated) DEVICE — WATER IRRIGATION STERILE 1000ML (12EA/CA)

## (undated) DEVICE — GLOVE BIOGEL SZ 6.5 SURGICAL PF LTX (50PR/BX 4BX/CA)

## (undated) DEVICE — PROTECTOR ULNA NERVE - (36PR/CA)

## (undated) DEVICE — STAPLER CONTOUR CURVED GREEN 4.8MM W/STAPLE (3EA/BX)

## (undated) DEVICE — CONNECTOR CATHETER URET Y-TUBE

## (undated) DEVICE — SENSOR SPO2 NEO LNCS ADHESIVE (20/BX) SEE USER NOTES

## (undated) DEVICE — SUTURE 0 PDS CT-1 CR 8 X 18 (12PK/BX)"

## (undated) DEVICE — PACK CYSTO III (2EA/CA)

## (undated) DEVICE — CATHETER URET OPEN END 6FR (10EA/BX)

## (undated) DEVICE — LIGASURE TISSUE FUSION  - SINGLE USE (6/CA)

## (undated) DEVICE — SET LEADWIRE 5 LEAD BEDSIDE DISPOSABLE ECG (1SET OF 5/EA)

## (undated) DEVICE — GLOVE SURGICAL LATEX POWDER FREE STIRLE SZ 8 ENCORE MICROPTIC (200PR/CA)

## (undated) DEVICE — STAPLER 75MM LINEAR OPEN (3EA/BX)

## (undated) DEVICE — GLOVE BIOGEL PI ORTHO SZ 6 1/2 SURGICAL PF LF (40PR/BX)

## (undated) DEVICE — GOWN WARMING STANDARD FLEX - (30/CA)

## (undated) DEVICE — BOVIE  BLADE 6 EXTENDED - (50/PK)

## (undated) DEVICE — SUTURE 3-0 VICRYL PLUS SH - 8X 18 INCH (12/BX)

## (undated) DEVICE — 1 PDS CT-1 PO

## (undated) DEVICE — SODIUM CHL IRRIGATION 0.9% 1000ML (12EA/CA)

## (undated) DEVICE — GLOVE BIOGEL INDICATOR SZ 6.5 SURGICAL PF LTX - (50PR/BX 4BX/CA)

## (undated) DEVICE — SODIUM CHL. IRRIGATION 0.9% 3000ML (4EA/CA 65CA/PF)

## (undated) DEVICE — MASK ANESTHESIA ADULT  - (100/CA)

## (undated) DEVICE — PACK MAJOR BASIN - (2EA/CA)

## (undated) DEVICE — SUTURE 3-0 VICRYL PLUS SH - 27 INCH (36/BX)

## (undated) DEVICE — STAPLER SKIN DISP - (6/BX 10BX/CA) VISISTAT

## (undated) DEVICE — KIT ANESTHESIA W/CIRCUIT & 3/LT BAG W/FILTER (20EA/CA)

## (undated) DEVICE — SUTURE ETHILON 2-0 FSLX 30 (36PK/BX)"

## (undated) DEVICE — GLOVE BIOGEL PI INDICATOR SZ 8.5 SURGICAL PF LF - (50PR/BX 4BX/CA)

## (undated) DEVICE — HEAD HOLDER JUNIOR/ADULT

## (undated) DEVICE — SUTURE 2-0 VICRYL PLUS TP-1 - (24/BX)

## (undated) DEVICE — ELECTRODE DUAL RETURN W/ CORD - (50/PK)

## (undated) DEVICE — SET SUCT.W/SLEEVE VIA-GUARD - (10/BX10BX/CA)

## (undated) DEVICE — SLEEVE, VASO, THIGH, MED

## (undated) DEVICE — TUBING CLEARLINK DUO-VENT - C-FLO (48EA/CA)

## (undated) DEVICE — DRAPE LAPAROTOMY T SHEET - (12EA/CA)

## (undated) DEVICE — SPONGE GAUZESTER 4 X 4 4PLY - (128PK/CA)

## (undated) DEVICE — BAG DRAINAGE URINARY CLOSED 2000ML (20EA/CA)

## (undated) DEVICE — NEPTUNE 4 PORT MANIFOLD - (20/PK)

## (undated) DEVICE — SUTURE 0 PDS CT-2 (36PK/BX)

## (undated) DEVICE — GLOVE BIOGEL INDICATOR SZ 8 SURGICAL PF LTX - (50/BX 4BX/CA)

## (undated) DEVICE — CHLORAPREP 26 ML APPLICATOR - ORANGE TINT(25/CA)

## (undated) DEVICE — SUTURE 1 PDS PLUS CT-1 36IN (36EA/BX)

## (undated) DEVICE — GLOVE SZ 6.5 BIOGEL PI MICRO - PF LF (50PR/BX)

## (undated) DEVICE — CANISTER SUCTION 3000ML MECHANICAL FILTER AUTO SHUTOFF MEDI-VAC NONSTERILE LF DISP  (40EA/CA)

## (undated) DEVICE — STAPLER 60MM BLUE 3.5MM WITH - STAPLE (3EA/BX)

## (undated) DEVICE — DRAPE LARGE 3 QUARTER - (20/CA)

## (undated) DEVICE — DRESSING LEUKOMED STERILE 11.75X4IN - (50/CA)

## (undated) DEVICE — GLOVE BIOGEL SZ 7 SURGICAL PF LTX - (50PR/BX 4BX/CA)

## (undated) DEVICE — SUCTION INSTRUMENT YANKAUER BULBOUS TIP W/O VENT (50EA/CA)

## (undated) DEVICE — GLOVE BIOGEL SZ 8 SURGICAL PF LTX - (50PR/BX 4BX/CA)

## (undated) DEVICE — SUTURE 2-0 COATED VICRYL PLUS - 12 X 18 INCH (12/BX)

## (undated) DEVICE — SUTURE GENERAL

## (undated) DEVICE — STAPLE 60MM BLUE 3.5MM - ECHELON (12/BX)

## (undated) DEVICE — WATER IRRIG. STER 3000 ML - (4/CA)

## (undated) DEVICE — GLOVE BIOGEL PI INDICATOR SZ 7.0 SURGICAL PF LF - (50/BX 4BX/CA)

## (undated) DEVICE — SUTURE NABSB 2-0 KS 30IN PRLN BLUE (36PK/BX)

## (undated) DEVICE — SUTURE 2-0 VICRYL PLUS SH - 27 INCH (36/BX)

## (undated) DEVICE — SUTURE 0 COATED VICRYL 6-18IN - (12PK/BX)

## (undated) DEVICE — SUTURE 4-0 MONOCRYL PLUS PS-2 - 27 INCH (36/BX)

## (undated) DEVICE — RETRACTOR O C SECTION LRY - (5/BX)

## (undated) DEVICE — SUTURE 2-0 PROLENE SH (36PK/BX)

## (undated) DEVICE — GRAFT MESH SEPRAFILM PRO PACK - 5/BX CONTAINS 6 3X5 PIECES

## (undated) DEVICE — GLOVE BIOGEL PI INDICATOR SZ 6.5 SURGICAL PF LF - (50/BX 4BX/CA)

## (undated) DEVICE — ELECTRODE 850 FOAM ADHESIVE - HYDROGEL RADIOTRNSPRNT (50/PK)

## (undated) DEVICE — SPONGE DRAIN 4 X 4IN 6-PLY - (2/PK25PK/BX12BX/CS)

## (undated) DEVICE — PAD LAP STERILE 18 X 18 - (5/PK 40PK/CA)

## (undated) DEVICE — SUTURE 2-0 ETHILON FS - (36/BX) 18 INCH

## (undated) DEVICE — GLOVE BIOGEL PI INDICATOR SZ 8.0 SURGICAL PF LF -(50/BX 4BX/CA)

## (undated) DEVICE — Device

## (undated) DEVICE — SET EXTENSION WITH 2 PORTS (48EA/CA) ***PART #2C8610 IS A SUBSTITUTE*****

## (undated) DEVICE — DRAIN PENROSE STERILE 1/4 X - 18 IN  (25EA/BX)

## (undated) DEVICE — STAPLE 75MM LINEAR (12EA/BX)

## (undated) DEVICE — LACTATED RINGERS INJ 1000 ML - (14EA/CA 60CA/PF)

## (undated) DEVICE — DRAPE UNDER BUTTOCK LRG (40/CA)

## (undated) DEVICE — SUTURE2-0 27IN VCRL ANTI VIOL (36PK/BX)

## (undated) DEVICE — GOWN SURGEONS X-LARGE - DISP. (30/CA)

## (undated) DEVICE — TRAY SKIN SCRUB PVP WET (20EA/CA) PART #DYND70356 DISCONTINUED

## (undated) DEVICE — GLOVE BIOGEL INDICATOR SZ 7SURGICAL PF LTX - (50/BX 4BX/CA)

## (undated) DEVICE — TRAY MULTI-LUMEN 7FR PRESSURE W/MAX BARRIER AND BIOPATCH - (5/CA)